# Patient Record
Sex: MALE | Race: BLACK OR AFRICAN AMERICAN | NOT HISPANIC OR LATINO
[De-identification: names, ages, dates, MRNs, and addresses within clinical notes are randomized per-mention and may not be internally consistent; named-entity substitution may affect disease eponyms.]

---

## 2017-04-05 ENCOUNTER — RESULT CHARGE (OUTPATIENT)
Age: 57
End: 2017-04-05

## 2017-04-05 ENCOUNTER — LABORATORY RESULT (OUTPATIENT)
Age: 57
End: 2017-04-05

## 2017-04-05 ENCOUNTER — RX RENEWAL (OUTPATIENT)
Age: 57
End: 2017-04-05

## 2017-04-05 ENCOUNTER — APPOINTMENT (OUTPATIENT)
Dept: INTERNAL MEDICINE | Facility: HOSPITAL | Age: 57
End: 2017-04-05

## 2017-04-05 ENCOUNTER — OUTPATIENT (OUTPATIENT)
Dept: OUTPATIENT SERVICES | Facility: HOSPITAL | Age: 57
LOS: 1 days | End: 2017-04-05

## 2017-04-05 VITALS
BODY MASS INDEX: 34.07 KG/M2 | SYSTOLIC BLOOD PRESSURE: 110 MMHG | HEIGHT: 67 IN | HEART RATE: 90 BPM | OXYGEN SATURATION: 95 % | WEIGHT: 217.04 LBS | DIASTOLIC BLOOD PRESSURE: 82 MMHG

## 2017-04-05 DIAGNOSIS — E11.9 TYPE 2 DIABETES MELLITUS WITHOUT COMPLICATIONS: ICD-10-CM

## 2017-04-05 LAB
BASOPHILS # BLD AUTO: 0.05 K/UL — SIGNIFICANT CHANGE UP (ref 0–0.2)
BASOPHILS NFR BLD AUTO: 0.7 % — SIGNIFICANT CHANGE UP (ref 0–2)
CHOLEST SERPL-MCNC: 194 MG/DL — SIGNIFICANT CHANGE UP (ref 120–199)
EOSINOPHIL # BLD AUTO: 0.23 K/UL — SIGNIFICANT CHANGE UP (ref 0–0.5)
EOSINOPHIL NFR BLD AUTO: 3.2 % — SIGNIFICANT CHANGE UP (ref 0–6)
GLUCOSE BLDC GLUCOMTR-MCNC: 172
HBA1C BLD-MCNC: 7.1 % — HIGH (ref 4–5.6)
HCT VFR BLD CALC: 48.3 % — SIGNIFICANT CHANGE UP (ref 39–50)
HDLC SERPL-MCNC: 39 MG/DL — SIGNIFICANT CHANGE UP (ref 35–55)
HGB BLD-MCNC: 17.3 G/DL — HIGH (ref 13–17)
IMM GRANULOCYTES NFR BLD AUTO: 0.5 % — SIGNIFICANT CHANGE UP (ref 0–1.5)
LIPID PNL WITH DIRECT LDL SERPL: 130 MG/DL — SIGNIFICANT CHANGE UP
LYMPHOCYTES # BLD AUTO: 3.17 K/UL — SIGNIFICANT CHANGE UP (ref 1–3.3)
LYMPHOCYTES # BLD AUTO: 43.5 % — SIGNIFICANT CHANGE UP (ref 13–44)
MCHC RBC-ENTMCNC: 29.8 PG — SIGNIFICANT CHANGE UP (ref 27–34)
MCHC RBC-ENTMCNC: 35.8 % — SIGNIFICANT CHANGE UP (ref 32–36)
MCV RBC AUTO: 83.3 FL — SIGNIFICANT CHANGE UP (ref 80–100)
MONOCYTES # BLD AUTO: 0.53 K/UL — SIGNIFICANT CHANGE UP (ref 0–0.9)
MONOCYTES NFR BLD AUTO: 7.3 % — SIGNIFICANT CHANGE UP (ref 2–14)
NEUTROPHILS # BLD AUTO: 3.26 K/UL — SIGNIFICANT CHANGE UP (ref 1.8–7.4)
NEUTROPHILS NFR BLD AUTO: 44.8 % — SIGNIFICANT CHANGE UP (ref 43–77)
PLATELET # BLD AUTO: 292 K/UL — SIGNIFICANT CHANGE UP (ref 150–400)
PMV BLD: 9.4 FL — SIGNIFICANT CHANGE UP (ref 7–13)
RBC # BLD: 5.8 M/UL — SIGNIFICANT CHANGE UP (ref 4.2–5.8)
RBC # FLD: 13.9 % — SIGNIFICANT CHANGE UP (ref 10.3–14.5)
TRIGL SERPL-MCNC: 209 MG/DL — HIGH (ref 10–149)
TSH SERPL-MCNC: 1.16 UIU/ML — SIGNIFICANT CHANGE UP (ref 0.27–4.2)
WBC # BLD: 7.28 K/UL — SIGNIFICANT CHANGE UP (ref 3.8–10.5)
WBC # FLD AUTO: 7.28 K/UL — SIGNIFICANT CHANGE UP (ref 3.8–10.5)

## 2017-04-06 DIAGNOSIS — E78.5 HYPERLIPIDEMIA, UNSPECIFIED: ICD-10-CM

## 2017-04-06 DIAGNOSIS — H91.90 UNSPECIFIED HEARING LOSS, UNSPECIFIED EAR: ICD-10-CM

## 2017-04-06 DIAGNOSIS — E66.9 OBESITY, UNSPECIFIED: ICD-10-CM

## 2017-04-06 DIAGNOSIS — D75.1 SECONDARY POLYCYTHEMIA: ICD-10-CM

## 2017-04-06 DIAGNOSIS — I10 ESSENTIAL (PRIMARY) HYPERTENSION: ICD-10-CM

## 2017-04-06 DIAGNOSIS — G47.33 OBSTRUCTIVE SLEEP APNEA (ADULT) (PEDIATRIC): ICD-10-CM

## 2017-05-01 ENCOUNTER — RX RENEWAL (OUTPATIENT)
Age: 57
End: 2017-05-01

## 2017-05-10 ENCOUNTER — APPOINTMENT (OUTPATIENT)
Dept: INTERNAL MEDICINE | Facility: HOSPITAL | Age: 57
End: 2017-05-10

## 2017-05-15 ENCOUNTER — RX RENEWAL (OUTPATIENT)
Age: 57
End: 2017-05-15

## 2017-06-01 ENCOUNTER — EMERGENCY (EMERGENCY)
Facility: HOSPITAL | Age: 57
LOS: 1 days | Discharge: HOME | End: 2017-06-01
Attending: EMERGENCY MEDICINE | Admitting: EMERGENCY MEDICINE
Payer: MEDICAID

## 2017-06-01 VITALS
HEART RATE: 85 BPM | DIASTOLIC BLOOD PRESSURE: 103 MMHG | TEMPERATURE: 98 F | RESPIRATION RATE: 16 BRPM | OXYGEN SATURATION: 100 % | SYSTOLIC BLOOD PRESSURE: 141 MMHG

## 2017-06-01 LAB
ALBUMIN SERPL ELPH-MCNC: 4.4 G/DL — SIGNIFICANT CHANGE UP (ref 3.3–5)
ALP SERPL-CCNC: 62 U/L — SIGNIFICANT CHANGE UP (ref 40–120)
ALT FLD-CCNC: 23 U/L — SIGNIFICANT CHANGE UP (ref 4–41)
APTT BLD: 37 SEC — SIGNIFICANT CHANGE UP (ref 27.5–37.4)
AST SERPL-CCNC: 17 U/L — SIGNIFICANT CHANGE UP (ref 4–40)
BILIRUB SERPL-MCNC: 0.5 MG/DL — SIGNIFICANT CHANGE UP (ref 0.2–1.2)
BUN SERPL-MCNC: 14 MG/DL — SIGNIFICANT CHANGE UP (ref 7–23)
CALCIUM SERPL-MCNC: 9.9 MG/DL — SIGNIFICANT CHANGE UP (ref 8.4–10.5)
CHLORIDE SERPL-SCNC: 103 MMOL/L — SIGNIFICANT CHANGE UP (ref 98–107)
CO2 SERPL-SCNC: 28 MMOL/L — SIGNIFICANT CHANGE UP (ref 22–31)
CREAT SERPL-MCNC: 1.13 MG/DL — SIGNIFICANT CHANGE UP (ref 0.5–1.3)
GLUCOSE SERPL-MCNC: 86 MG/DL — SIGNIFICANT CHANGE UP (ref 70–99)
HCT VFR BLD CALC: 48.9 % — SIGNIFICANT CHANGE UP (ref 39–50)
HGB BLD-MCNC: 17.7 G/DL — HIGH (ref 13–17)
INR BLD: 0.96 — SIGNIFICANT CHANGE UP (ref 0.88–1.17)
MCHC RBC-ENTMCNC: 30.3 PG — SIGNIFICANT CHANGE UP (ref 27–34)
MCHC RBC-ENTMCNC: 36.2 % — HIGH (ref 32–36)
MCV RBC AUTO: 83.6 FL — SIGNIFICANT CHANGE UP (ref 80–100)
OB PNL STL: POSITIVE — SIGNIFICANT CHANGE UP
PLATELET # BLD AUTO: 292 K/UL — SIGNIFICANT CHANGE UP (ref 150–400)
PMV BLD: 8.9 FL — SIGNIFICANT CHANGE UP (ref 7–13)
POTASSIUM SERPL-MCNC: 4.5 MMOL/L — SIGNIFICANT CHANGE UP (ref 3.5–5.3)
POTASSIUM SERPL-SCNC: 4.5 MMOL/L — SIGNIFICANT CHANGE UP (ref 3.5–5.3)
PROT SERPL-MCNC: 7.8 G/DL — SIGNIFICANT CHANGE UP (ref 6–8.3)
PROTHROM AB SERPL-ACNC: 10.7 SEC — SIGNIFICANT CHANGE UP (ref 9.8–13.1)
RBC # BLD: 5.85 M/UL — HIGH (ref 4.2–5.8)
RBC # FLD: 13.7 % — SIGNIFICANT CHANGE UP (ref 10.3–14.5)
SODIUM SERPL-SCNC: 144 MMOL/L — SIGNIFICANT CHANGE UP (ref 135–145)
WBC # BLD: 6.75 K/UL — SIGNIFICANT CHANGE UP (ref 3.8–10.5)
WBC # FLD AUTO: 6.75 K/UL — SIGNIFICANT CHANGE UP (ref 3.8–10.5)

## 2017-06-01 PROCEDURE — 99284 EMERGENCY DEPT VISIT MOD MDM: CPT

## 2017-06-01 RX ORDER — DOCUSATE SODIUM 100 MG
2 CAPSULE ORAL
Qty: 60 | Refills: 0 | OUTPATIENT
Start: 2017-06-01

## 2017-06-01 RX ORDER — POLYETHYLENE GLYCOL 3350 17 G/17G
17 POWDER, FOR SOLUTION ORAL
Qty: 17 | Refills: 0 | OUTPATIENT
Start: 2017-06-01 | End: 2017-06-02

## 2017-06-01 RX ORDER — DOCUSATE SODIUM 100 MG
2 CAPSULE ORAL
Qty: 60 | Refills: 0 | OUTPATIENT
Start: 2017-06-01 | End: 2017-07-01

## 2017-06-01 RX ORDER — POLYETHYLENE GLYCOL 3350 17 G/17G
17 POWDER, FOR SOLUTION ORAL
Qty: 1 | Refills: 0 | OUTPATIENT
Start: 2017-06-01 | End: 2017-07-01

## 2017-06-01 NOTE — ED PROVIDER NOTE - PHYSICAL EXAMINATION
ATTENDING PHYSICAL EXAM DR. Lowery ***GEN - NAD; well appearing; A+O x3 ***HEAD - NC/AT ***EYES/NOSE - PERRL, EOMI, mucous membranes moist, no discharge ***THROAT: Oral cavity and pharynx normal. No inflammation, swelling, exudate, or lesions.  ***NECK: Neck supple, non-tender without lymphadenopathy, no masses, no JVD.   ***PULMONARY - CTA b/l, symmetric breath sounds. ***CARDIAC -s1s2, RRR, no M,R,G  ***ABDOMEN - +BS, mild distention, NT, soft, no guarding, no rebound, no masses - Rectal exam performed with chaperone PCA Supriya.  Noted brown stool with red blood around anus.  Positive external hemorrhoid without bleeding.  Normal tone.  No fissure or mass palpated in rectum.  Guaiac test sent.  ***BACK - no CVA tenderness, Normal  spine ***EXTREMITIES - symmetric pulses, 2+ dp, capillary refill < 2 seconds, no clubbing, no cyanosis, no edema ***SKIN - no rash or bruising   ***NEUROLOGIC - alert, CN 2-12 intact

## 2017-06-01 NOTE — ED PROVIDER NOTE - PMH
Diabetes    Personal History of Hypertension    Personal history of thromboembolic disease    Personal history of thromboembolic disease

## 2017-06-01 NOTE — ED PROVIDER NOTE - MEDICAL DECISION MAKING DETAILS
58 y/o M with episode BRBPR, abd bloating.  Check labs, H&H, stool guaiac.  Re-eval.  Consider inpatient vs outpatient GI evaluation.

## 2017-06-01 NOTE — ED PROVIDER NOTE - OBJECTIVE STATEMENT
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 Pt states he has been having hard bowel movements for the past several days and today after passing hard stool he had a brief episode of bright red bleeding from the rectum. Has had small amounts of blood intermittently in the past but never this much. Endorses feeling bloated today but no abdominal pain, nausea, vomiting. No hx of abdominal surgeries. Last colonoscopy 2 years ago. 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 Pt states he has been having hard bowel movements for the past several days and today after passing hard stool he had a brief episode of bright red bleeding from the rectum. Has had small amounts of blood intermittently in the past but never this much. Endorses feeling bloated today but no abdominal pain, nausea, vomiting. No hx of abdominal surgeries. Last colonoscopy 2 years ago.  Attending - Agree with above.  I evaluated patient myself.  56 y/o M with daughter at bedside.  Noted hx HTN, DM, daily ASA.  To ED post episode BRBPR following passing a hard stool.  No abd pain.  Mild bloating sensation.  No back pain.  No lightheadedness.  No chest pain or shortness of breathe.  Only mild bleeding in past.  Never this much before.

## 2017-06-01 NOTE — ED PROVIDER NOTE - GASTROINTESTINAL, MLM
Abdomen soft, non-tender, no guarding. Rectal exam (Kimo Menjivar RN) brown stool in vault, no active bleeding, small external hemorrhoid felt

## 2017-06-01 NOTE — ED POST DISCHARGE NOTE - RESULT SUMMARY
Pt. called ED; states Rx's sent to [facility] not able to be filled because [facility] pt requests Rx's be sent to [facility] in [place]; states he has had Rx's filled there before with no issues.

## 2017-06-01 NOTE — ED POST DISCHARGE NOTE - OTHER COMMUNICATION
eRx's for Colace and Miralax written (same as ED providers that took care of pt) and sent to [facility].

## 2017-06-12 ENCOUNTER — APPOINTMENT (OUTPATIENT)
Dept: INTERNAL MEDICINE | Facility: HOSPITAL | Age: 57
End: 2017-06-12

## 2017-06-12 ENCOUNTER — OUTPATIENT (OUTPATIENT)
Dept: OUTPATIENT SERVICES | Facility: HOSPITAL | Age: 57
LOS: 1 days | End: 2017-06-12

## 2017-06-12 VITALS
DIASTOLIC BLOOD PRESSURE: 76 MMHG | HEART RATE: 72 BPM | BODY MASS INDEX: 32.89 KG/M2 | HEIGHT: 67 IN | SYSTOLIC BLOOD PRESSURE: 118 MMHG | WEIGHT: 209.55 LBS

## 2017-06-13 DIAGNOSIS — D57.1 SICKLE-CELL DISEASE WITHOUT CRISIS: ICD-10-CM

## 2017-06-13 DIAGNOSIS — K64.9 UNSPECIFIED HEMORRHOIDS: ICD-10-CM

## 2017-06-13 DIAGNOSIS — I10 ESSENTIAL (PRIMARY) HYPERTENSION: ICD-10-CM

## 2017-06-13 DIAGNOSIS — E11.9 TYPE 2 DIABETES MELLITUS WITHOUT COMPLICATIONS: ICD-10-CM

## 2017-06-13 DIAGNOSIS — G47.33 OBSTRUCTIVE SLEEP APNEA (ADULT) (PEDIATRIC): ICD-10-CM

## 2017-06-13 DIAGNOSIS — K62.5 HEMORRHAGE OF ANUS AND RECTUM: ICD-10-CM

## 2017-06-13 DIAGNOSIS — E78.5 HYPERLIPIDEMIA, UNSPECIFIED: ICD-10-CM

## 2017-06-13 DIAGNOSIS — K59.00 CONSTIPATION, UNSPECIFIED: ICD-10-CM

## 2017-06-13 LAB — GLUCOSE BLDC GLUCOMTR-MCNC: 98

## 2017-08-21 ENCOUNTER — RX RENEWAL (OUTPATIENT)
Age: 57
End: 2017-08-21

## 2017-10-30 ENCOUNTER — INPATIENT (INPATIENT)
Facility: HOSPITAL | Age: 57
LOS: 2 days | Discharge: HOME | End: 2017-11-02
Attending: INTERNAL MEDICINE | Admitting: INTERNAL MEDICINE
Payer: MEDICAID

## 2017-10-30 VITALS
RESPIRATION RATE: 20 BRPM | SYSTOLIC BLOOD PRESSURE: 152 MMHG | DIASTOLIC BLOOD PRESSURE: 109 MMHG | TEMPERATURE: 98 F | HEART RATE: 77 BPM | OXYGEN SATURATION: 98 %

## 2017-10-30 LAB
ALBUMIN SERPL ELPH-MCNC: 4.5 G/DL — SIGNIFICANT CHANGE UP (ref 3.3–5)
ALP SERPL-CCNC: 69 U/L — SIGNIFICANT CHANGE UP (ref 40–120)
ALT FLD-CCNC: 21 U/L — SIGNIFICANT CHANGE UP (ref 4–41)
APTT BLD: 34.1 SEC — SIGNIFICANT CHANGE UP (ref 27.5–37.4)
AST SERPL-CCNC: 17 U/L — SIGNIFICANT CHANGE UP (ref 4–40)
BASOPHILS # BLD AUTO: 0.06 K/UL — SIGNIFICANT CHANGE UP (ref 0–0.2)
BASOPHILS NFR BLD AUTO: 1 % — SIGNIFICANT CHANGE UP (ref 0–2)
BILIRUB SERPL-MCNC: 0.6 MG/DL — SIGNIFICANT CHANGE UP (ref 0.2–1.2)
BUN SERPL-MCNC: 11 MG/DL — SIGNIFICANT CHANGE UP (ref 7–23)
CALCIUM SERPL-MCNC: 9.6 MG/DL — SIGNIFICANT CHANGE UP (ref 8.4–10.5)
CHLORIDE SERPL-SCNC: 100 MMOL/L — SIGNIFICANT CHANGE UP (ref 98–107)
CK MB BLD-MCNC: 1.1 — SIGNIFICANT CHANGE UP (ref 0–2.5)
CK MB BLD-MCNC: 1.1 — SIGNIFICANT CHANGE UP (ref 0–2.5)
CK MB BLD-MCNC: 1.77 NG/ML — SIGNIFICANT CHANGE UP (ref 1–6.6)
CK MB BLD-MCNC: 2.07 NG/ML — SIGNIFICANT CHANGE UP (ref 1–6.6)
CK SERPL-CCNC: 163 U/L — SIGNIFICANT CHANGE UP (ref 30–200)
CK SERPL-CCNC: 191 U/L — SIGNIFICANT CHANGE UP (ref 30–200)
CO2 SERPL-SCNC: 31 MMOL/L — SIGNIFICANT CHANGE UP (ref 22–31)
CREAT SERPL-MCNC: 1.17 MG/DL — SIGNIFICANT CHANGE UP (ref 0.5–1.3)
EOSINOPHIL # BLD AUTO: 0.2 K/UL — SIGNIFICANT CHANGE UP (ref 0–0.5)
EOSINOPHIL NFR BLD AUTO: 3.4 % — SIGNIFICANT CHANGE UP (ref 0–6)
GLUCOSE SERPL-MCNC: 109 MG/DL — HIGH (ref 70–99)
HBA1C BLD-MCNC: 5.6 % — SIGNIFICANT CHANGE UP (ref 4–5.6)
HCT VFR BLD CALC: 54.5 % — HIGH (ref 39–50)
HGB BLD-MCNC: 18.9 G/DL — HIGH (ref 13–17)
IMM GRANULOCYTES # BLD AUTO: 0.02 # — SIGNIFICANT CHANGE UP
IMM GRANULOCYTES NFR BLD AUTO: 0.3 % — SIGNIFICANT CHANGE UP (ref 0–1.5)
INR BLD: 1.03 — SIGNIFICANT CHANGE UP (ref 0.88–1.17)
LYMPHOCYTES # BLD AUTO: 2.92 K/UL — SIGNIFICANT CHANGE UP (ref 1–3.3)
LYMPHOCYTES # BLD AUTO: 49.7 % — HIGH (ref 13–44)
MCHC RBC-ENTMCNC: 29.1 PG — SIGNIFICANT CHANGE UP (ref 27–34)
MCHC RBC-ENTMCNC: 34.7 % — SIGNIFICANT CHANGE UP (ref 32–36)
MCV RBC AUTO: 83.8 FL — SIGNIFICANT CHANGE UP (ref 80–100)
MONOCYTES # BLD AUTO: 0.56 K/UL — SIGNIFICANT CHANGE UP (ref 0–0.9)
MONOCYTES NFR BLD AUTO: 9.5 % — SIGNIFICANT CHANGE UP (ref 2–14)
NEUTROPHILS # BLD AUTO: 2.11 K/UL — SIGNIFICANT CHANGE UP (ref 1.8–7.4)
NEUTROPHILS NFR BLD AUTO: 36.1 % — LOW (ref 43–77)
NRBC # FLD: 0 — SIGNIFICANT CHANGE UP
PLATELET # BLD AUTO: 311 K/UL — SIGNIFICANT CHANGE UP (ref 150–400)
PMV BLD: 8.7 FL — SIGNIFICANT CHANGE UP (ref 7–13)
POTASSIUM SERPL-MCNC: 4.3 MMOL/L — SIGNIFICANT CHANGE UP (ref 3.5–5.3)
POTASSIUM SERPL-SCNC: 4.3 MMOL/L — SIGNIFICANT CHANGE UP (ref 3.5–5.3)
PROT SERPL-MCNC: 8.2 G/DL — SIGNIFICANT CHANGE UP (ref 6–8.3)
PROTHROM AB SERPL-ACNC: 11.5 SEC — SIGNIFICANT CHANGE UP (ref 9.8–13.1)
RBC # BLD: 6.5 M/UL — HIGH (ref 4.2–5.8)
RBC # FLD: 13.1 % — SIGNIFICANT CHANGE UP (ref 10.3–14.5)
SODIUM SERPL-SCNC: 140 MMOL/L — SIGNIFICANT CHANGE UP (ref 135–145)
TROPONIN T SERPL-MCNC: < 0.06 NG/ML — SIGNIFICANT CHANGE UP (ref 0–0.06)
TROPONIN T SERPL-MCNC: < 0.06 NG/ML — SIGNIFICANT CHANGE UP (ref 0–0.06)
WBC # BLD: 5.87 K/UL — SIGNIFICANT CHANGE UP (ref 3.8–10.5)
WBC # FLD AUTO: 5.87 K/UL — SIGNIFICANT CHANGE UP (ref 3.8–10.5)

## 2017-10-30 PROCEDURE — 70450 CT HEAD/BRAIN W/O DYE: CPT | Mod: 26

## 2017-10-30 PROCEDURE — 71020: CPT | Mod: 26

## 2017-10-30 RX ORDER — AMLODIPINE BESYLATE 2.5 MG/1
10 TABLET ORAL DAILY
Qty: 0 | Refills: 0 | Status: DISCONTINUED | OUTPATIENT
Start: 2017-10-31 | End: 2017-11-02

## 2017-10-30 RX ORDER — ACETAMINOPHEN 500 MG
650 TABLET ORAL ONCE
Qty: 0 | Refills: 0 | Status: COMPLETED | OUTPATIENT
Start: 2017-10-30 | End: 2017-10-30

## 2017-10-30 RX ORDER — METFORMIN HYDROCHLORIDE 850 MG/1
1000 TABLET ORAL
Qty: 0 | Refills: 0 | Status: DISCONTINUED | OUTPATIENT
Start: 2017-10-30 | End: 2017-10-30

## 2017-10-30 RX ORDER — AMLODIPINE BESYLATE 2.5 MG/1
5 TABLET ORAL ONCE
Qty: 0 | Refills: 0 | Status: COMPLETED | OUTPATIENT
Start: 2017-10-30 | End: 2017-10-30

## 2017-10-30 RX ORDER — ASPIRIN/CALCIUM CARB/MAGNESIUM 324 MG
162 TABLET ORAL ONCE
Qty: 0 | Refills: 0 | Status: COMPLETED | OUTPATIENT
Start: 2017-10-30 | End: 2017-10-30

## 2017-10-30 RX ORDER — METOCLOPRAMIDE HCL 10 MG
10 TABLET ORAL ONCE
Qty: 0 | Refills: 0 | Status: COMPLETED | OUTPATIENT
Start: 2017-10-30 | End: 2017-10-30

## 2017-10-30 RX ADMIN — Medication 10 MILLIGRAM(S): at 13:02

## 2017-10-30 RX ADMIN — AMLODIPINE BESYLATE 5 MILLIGRAM(S): 2.5 TABLET ORAL at 11:54

## 2017-10-30 RX ADMIN — Medication 162 MILLIGRAM(S): at 11:12

## 2017-10-30 RX ADMIN — Medication 650 MILLIGRAM(S): at 13:02

## 2017-10-30 RX ADMIN — Medication 650 MILLIGRAM(S): at 13:32

## 2017-10-30 NOTE — ED CDU PROVIDER INITIAL DAY NOTE - PROGRESS NOTE DETAILS
[facility] RIO Ramsey Addendum------  This patient was signed out to me by U RIO Gandhi and U day attending Dr. Torres on 10/30/17 at 1900 hrs; test results reviewed.  In interim, pt has been resting comfortably; no issues to date.  CE x 2 negative; pt pending stress test in the morning.  Pt. denies interim chest discomfort/SOB/other c/o.  Pt stable at present; will continue to monitor / reassess.

## 2017-10-30 NOTE — ED ADULT NURSE NOTE - OBJECTIVE STATEMENT
pt is a 57 yr old male head ache and chest pain radiating from right to left side of chest 10/10. pt states pain as been on and off x 2 months when pt ran out of bp meds. pt states pain worsened this weekend, with numbness to left side of face, denies blurred vision, weakness, difficulty walking. pt weathers, no acute distress noted at this time, bp elevated. piv placed, labs sent, md atwood in progress

## 2017-10-30 NOTE — ED CDU PROVIDER INITIAL DAY NOTE - ATTENDING CONTRIBUTION TO CARE
Dr. Torres:  I performed a face to face bedside interview with patient regarding history of present illness, review of symptoms and past medical history. I completed an independent physical exam.  I have discussed patient's plan of care with PA.   I agree with note as stated above, having amended the EMR as needed to reflect my findings.   This includes HISTORY OF PRESENT ILLNESS, HIV, PAST MEDICAL/SURGICAL/FAMILY/SOCIAL HISTORY, ALLERGIES AND HOME MEDICATIONS, REVIEW OF SYSTEMS, PHYSICAL EXAM, and any PROGRESS NOTES during the time I functioned as the attending physician for this patient.    57M h/o HTN, DM, splenic/renal infarcts, polycythemia, presents with headache x 1 week.  Also endorses intermittent chest pains over past two months.  Has not been compliant with meds.    Exam:  - nad  - rrr  - ctab  - abd soft ntnd  - no focal neuro deficits    A/P  - headache improved in ED  - CP, none currently ,will obtain serial enzymes and stress test in AM

## 2017-10-30 NOTE — ED PROVIDER NOTE - OBJECTIVE STATEMENT
Pt is a 56 y/o M nonsmoker PMHx HTN, HLD, DM, splenic infarct and renal infarct Pt is a 58 y/o M nonsmoker PMHx HTN, HLD, DM, splenic infarct and renal infarct, h/o "high blood counts" which required therapeutic phlebotomy p/w headache x 1 week.  Pt notes gradual onset, waxing and waning left sided temporal headache after running out of blood pressure medication (unsure which).  Pt states over three days, pain became severe and pt took tylenol, which provided relief.  Pt note associated tingling to left temporal region.  Pt presenting today because pain not resolving.  Pt also notes intermittent parasternal chest pain 2-3x / week for past 2 months.  Pain is nonradiating, nonpleuritic, nonpositional.  Pt notes exertional dyspnea x few months.  Denies fevers, chills, nausea, vomiting, numbness, weakness, visual/auditory disturbances, hemoptysis, calf pain/swelling, h/o dvt/pe in past, recent surgeries, family history of heart disease. Pt is a 58 y/o M nonsmoker PMHx HTN, HLD, DM, splenic infarct and renal infarct, h/o "high blood counts" which required therapeutic phlebotomy p/w headache x 1 week.  Pt notes gradual onset, waxing and waning left sided temporal headache after running out of blood pressure medication (unsure which).  Pt states over three days, pain became severe and pt took tylenol, which provided relief.  Pt note associated tingling to left temporal region.  Pt presenting today because pain not resolving.  Pt also notes intermittent parasternal chest pain 2-3x / week for past 2 months.  Pain is nonradiating, nonpleuritic, nonpositional.  Pt notes exertional dyspnea x few months.  Denies fevers, chills, nausea, vomiting, numbness, weakness, visual/auditory disturbances, hemoptysis, calf pain/swelling, h/o dvt/pe in past, recent surgeries, family history of heart disease.    Attendinyo male presents with headache and chest pain for a couple of weeks.  Headache gradual in onset.  Chest pain is nonradiating and not exertional.  of note, pt has been out of BP meds for a couple of months.  Did not see PCP for this.

## 2017-10-30 NOTE — ED ADULT TRIAGE NOTE - CHIEF COMPLAINT QUOTE
pt co headache x 2 weeks states ran out of b/p medication but took one of them for the last two days. pt also reports  right sided chest pain since this am that radiates across his chest. Pts eyes are blood shoot in triage.

## 2017-10-30 NOTE — ED PROVIDER NOTE - MEDICAL DECISION MAKING DETAILS
1.  Headache.  Likely benign but has been on and off for a couple of weeks.  Will get CT head to evaluate for mass or other abnormality.  2.  chest pain.  will get ecg, troponin, consider CDU for further evaluation.

## 2017-10-30 NOTE — ED CDU PROVIDER INITIAL DAY NOTE - OBJECTIVE STATEMENT
Pt is a 56 y/o M nonsmoker PMHx HTN, HLD, DM, splenic infarct and renal infarct, h/o "high blood counts" which required therapeutic phlebotomy p/w headache x 1 week.  Pt notes gradual onset, waxing and waning left sided temporal headache after running out of blood pressure medication (unsure which).  Pt states over three days, pain became severe and pt took tylenol, which provided relief.  Pt note associated tingling to left temporal region.  Pt presenting today because pain not resolving.  Pt also notes intermittent parasternal chest pain 2-3x / week for past 2 months.  Pain is nonradiating, nonpleuritic, nonpositional.  Pt notes exertional dyspnea x few months.  Denies fevers, chills, nausea, vomiting, numbness, weakness, visual/auditory disturbances, hemoptysis, calf pain/swelling, h/o dvt/pe in past, recent surgeries, family history of heart disease.    Attendinyo male presents with headache and chest pain for a couple of weeks.  Headache gradual in onset.  Chest pain is nonradiating and not exertional.  of note, pt has been out of BP meds for a couple of months.  Did not see PCP for this.    ED Note Above: RIO Gandhi: 56 y/o male with a PMHx of DM, HTN, HLD presents to the ER c/o intermittent HA x1 week and 2 months of intermittent chest pain.  Pt ran out of his blood pressure medication and had not taken it for the past few months.  Pt denies shortness of breath, palpitations, fevers, chills, cough, weakness, dizziness, nausea, vomiting.  Pt placed in CDU for cardiac monitoring and Nuclear stress test.

## 2017-10-30 NOTE — ED PROVIDER NOTE - PROGRESS NOTE DETAILS
RIO ROJAS:  Pt notes improvement in headache.  CT negative for acute pathology.  Pt accepted to CDU.

## 2017-10-31 DIAGNOSIS — I20.9 ANGINA PECTORIS, UNSPECIFIED: ICD-10-CM

## 2017-10-31 DIAGNOSIS — I10 ESSENTIAL (PRIMARY) HYPERTENSION: ICD-10-CM

## 2017-10-31 DIAGNOSIS — E11.9 TYPE 2 DIABETES MELLITUS WITHOUT COMPLICATIONS: ICD-10-CM

## 2017-10-31 DIAGNOSIS — D45 POLYCYTHEMIA VERA: ICD-10-CM

## 2017-10-31 DIAGNOSIS — R07.9 CHEST PAIN, UNSPECIFIED: ICD-10-CM

## 2017-10-31 LAB
GLUCOSE BLDC GLUCOMTR-MCNC: 102 MG/DL — HIGH (ref 70–99)
GLUCOSE BLDC GLUCOMTR-MCNC: 136 MG/DL — HIGH (ref 70–99)

## 2017-10-31 PROCEDURE — 78452 HT MUSCLE IMAGE SPECT MULT: CPT | Mod: 26

## 2017-10-31 PROCEDURE — 93016 CV STRESS TEST SUPVJ ONLY: CPT | Mod: GC

## 2017-10-31 PROCEDURE — 93018 CV STRESS TEST I&R ONLY: CPT | Mod: GC

## 2017-10-31 RX ORDER — INFLUENZA VIRUS VACCINE 15; 15; 15; 15 UG/.5ML; UG/.5ML; UG/.5ML; UG/.5ML
0.5 SUSPENSION INTRAMUSCULAR ONCE
Qty: 0 | Refills: 0 | Status: DISCONTINUED | OUTPATIENT
Start: 2017-10-31 | End: 2017-11-02

## 2017-10-31 RX ORDER — INSULIN LISPRO 100/ML
VIAL (ML) SUBCUTANEOUS
Qty: 0 | Refills: 0 | Status: DISCONTINUED | OUTPATIENT
Start: 2017-10-31 | End: 2017-11-02

## 2017-10-31 RX ORDER — DEXTROSE 50 % IN WATER 50 %
1 SYRINGE (ML) INTRAVENOUS ONCE
Qty: 0 | Refills: 0 | Status: DISCONTINUED | OUTPATIENT
Start: 2017-10-31 | End: 2017-11-02

## 2017-10-31 RX ORDER — DEXTROSE 50 % IN WATER 50 %
25 SYRINGE (ML) INTRAVENOUS ONCE
Qty: 0 | Refills: 0 | Status: DISCONTINUED | OUTPATIENT
Start: 2017-10-31 | End: 2017-11-02

## 2017-10-31 RX ORDER — INSULIN LISPRO 100/ML
VIAL (ML) SUBCUTANEOUS AT BEDTIME
Qty: 0 | Refills: 0 | Status: DISCONTINUED | OUTPATIENT
Start: 2017-10-31 | End: 2017-11-02

## 2017-10-31 RX ORDER — METFORMIN HYDROCHLORIDE 850 MG/1
1 TABLET ORAL
Qty: 0 | Refills: 0 | COMMUNITY

## 2017-10-31 RX ORDER — LISINOPRIL 2.5 MG/1
20 TABLET ORAL DAILY
Qty: 0 | Refills: 0 | Status: DISCONTINUED | OUTPATIENT
Start: 2017-10-31 | End: 2017-11-02

## 2017-10-31 RX ORDER — DEXTROSE 50 % IN WATER 50 %
12.5 SYRINGE (ML) INTRAVENOUS ONCE
Qty: 0 | Refills: 0 | Status: DISCONTINUED | OUTPATIENT
Start: 2017-10-31 | End: 2017-11-02

## 2017-10-31 RX ORDER — ASPIRIN/CALCIUM CARB/MAGNESIUM 324 MG
81 TABLET ORAL DAILY
Qty: 0 | Refills: 0 | Status: DISCONTINUED | OUTPATIENT
Start: 2017-10-31 | End: 2017-11-02

## 2017-10-31 RX ORDER — SODIUM CHLORIDE 9 MG/ML
1000 INJECTION, SOLUTION INTRAVENOUS
Qty: 0 | Refills: 0 | Status: DISCONTINUED | OUTPATIENT
Start: 2017-10-31 | End: 2017-11-02

## 2017-10-31 RX ORDER — ATORVASTATIN CALCIUM 80 MG/1
80 TABLET, FILM COATED ORAL AT BEDTIME
Qty: 0 | Refills: 0 | Status: DISCONTINUED | OUTPATIENT
Start: 2017-10-31 | End: 2017-11-02

## 2017-10-31 RX ORDER — GLUCAGON INJECTION, SOLUTION 0.5 MG/.1ML
1 INJECTION, SOLUTION SUBCUTANEOUS ONCE
Qty: 0 | Refills: 0 | Status: DISCONTINUED | OUTPATIENT
Start: 2017-10-31 | End: 2017-11-02

## 2017-10-31 RX ADMIN — ATORVASTATIN CALCIUM 80 MILLIGRAM(S): 80 TABLET, FILM COATED ORAL at 21:45

## 2017-10-31 RX ADMIN — LISINOPRIL 20 MILLIGRAM(S): 2.5 TABLET ORAL at 17:07

## 2017-10-31 RX ADMIN — Medication 81 MILLIGRAM(S): at 17:07

## 2017-10-31 RX ADMIN — AMLODIPINE BESYLATE 10 MILLIGRAM(S): 2.5 TABLET ORAL at 05:08

## 2017-10-31 NOTE — ED CDU PROVIDER SUBSEQUENT DAY NOTE - PROGRESS NOTE DETAILS
Pt denies any complaints as of now. Pt was informed he will be admitted for resting images. Pt agrees to stay. Pt states his PMD is Dr. Pineda affiliated with [facility] Pt denies any complaints as of now. Pt was informed he will be admitted for resting images. Pt agrees to stay. Pt states his PMD is Dr. Pineda affiliated with [facility], no contact info provided by pt, does not know the first name or phone number. Pt admitted to Dr. Carlson

## 2017-10-31 NOTE — ED CDU PROVIDER SUBSEQUENT DAY NOTE - HISTORY
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  Pt. was evaluated in the ED and sent to CDU for cardiac telemetry monitoring, CE #2, stress test, observation and reassessment.  In interim, pt has been resting comfortably; pt denies any interim pain / discomfort; denies SOB, abdominal pain, recurrent chest pain or other c/o.  CDU plan discussed with pt who verbalizes agreement with plan.

## 2017-10-31 NOTE — ED CDU PROVIDER DISPOSITION NOTE - CLINICAL COURSE
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  Pt. was evaluated in the ED and sent to CDU for cardiac telemetry monitoring, CE #2, stress test,  parasternal chest pain, stress lab requires the pt to stay for resting images, will admit the pt to Dr. Carlson.

## 2017-10-31 NOTE — H&P ADULT - MUSCULOSKELETAL
detailed exam no joint erythema/normal/ROM intact/no calf tenderness/no joint swelling/no joint warmth details…

## 2017-10-31 NOTE — H&P ADULT - NSHPSOCIALHISTORY_GEN_ALL_CORE
Pt lives in [place] with grandparents and extended family. Denies tobacco, alcohol, or illicit drug use.

## 2017-10-31 NOTE — H&P ADULT - PMH
Diabetes    HTN (hypertension)    Hyperlipidemia    Polycythemia vera    Renal infarct    Splenic infarct

## 2017-10-31 NOTE — H&P ADULT - ASSESSMENT
56 yo male w/ PMHx of DM, HTN, PCV, spleen infarct (2012), renal infarct (2012), sleep apnea, presenting with CP and HA. Pt admitted for resting images in AM. 56 yo male w/ PMHx of DM, HTN, PCV, spleen infarct (2012), renal infarct (2012), sleep apnea, presenting with non-exertional CP and HA. Pt admitted for resting images in AM.

## 2017-10-31 NOTE — H&P ADULT - HISTORY OF PRESENT ILLNESS
58 yo male w/ PMHx of DM, HTN, PCV, spleen infarct (2012), renal infarct (2012), sleep apnea, presenting with CP. Pt admitted for resting images after stress test. For about 2-3 months, pt has been experiencing intermittent, sharp, 5/10 , non-exertional right-sided CP with radiation to the left side of the chest. Pt experiences this pain about twice a week. For a few weeks he has also had a constant, frontal HA (more on L side) that he describes as "heavy" with radiation to the neck. Pt says he ran out of one of his BP medications but cannot specify which one. Pt also says he has trouble sleeping due to sleep apnea but does not use his CPAP machine because it makes him feel claustrophobic. He also c/o of constipation and experienced BRBPR about two times a week. Pt denies fever/chills, SOB, orthopnea, PND, abdominal pain, N/V/D.

## 2017-10-31 NOTE — ED CDU PROVIDER SUBSEQUENT DAY NOTE - PMH
Diabetes    Personal History of Hypertension    Personal history of thromboembolic disease    Personal history of thromboembolic disease Diabetes    HTN (hypertension)    Hyperlipidemia    Polycythemia vera    Renal infarct    Splenic infarct

## 2017-10-31 NOTE — ED CDU PROVIDER SUBSEQUENT DAY NOTE - ATTENDING CONTRIBUTION TO CARE
I performed a face to face bedside interview with patient regarding history of present illness, review of symptoms and past medical history. I completed an independent physical exam.  I have discussed patient's plan of care.   I agree with note as stated above, having amended the EMR as needed to reflect my findings. I have discussed the assessment and plan of care.  This includes during the time I functioned as the attending physician for this patient.  Attending Contribution to Care:agree with plan of PA. pt stable. no active cp

## 2017-10-31 NOTE — ED CDU PROVIDER DISPOSITION NOTE - ATTENDING CONTRIBUTION TO CARE
I performed a face to face bedside interview with patient regarding history of present illness, review of symptoms and past medical history. I completed an independent physical exam.  I have discussed patient's plan of care.   I agree with note as stated above, having amended the EMR as needed to reflect my findings. I have discussed the assessment and plan of care.  This includes during the time I functioned as the attending physician for this patient.  Attending Contribution to Care: agree with plan of PA.  pt stable, failed nuclear chest pain. requires admissino for resting stress test. stable for admission. no changes in EKG

## 2017-10-31 NOTE — H&P ADULT - NEGATIVE CARDIOVASCULAR SYMPTOMS
no dyspnea on exertion/no orthopnea/no peripheral edema/no palpitations/no paroxysmal nocturnal dyspnea

## 2017-10-31 NOTE — H&P ADULT - NSHPLABSRESULTS_GEN_ALL_CORE
ICU Vital Signs Last 24 Hrs  T(C): 36.8 (31 Oct 2017 12:39), Max: 36.9 (30 Oct 2017 21:51)  T(F): 98.2 (31 Oct 2017 12:39), Max: 98.5 (30 Oct 2017 21:51)  HR: 80 (31 Oct 2017 12:39) (56 - 80)  BP: 151/98 (31 Oct 2017 12:39) (130/74 - 164/106)  BP(mean): --  ABP: --  ABP(mean): --  RR: 18 (31 Oct 2017 12:39) (16 - 18)  SpO2: 97% (31 Oct 2017 12:39) (96% - 98%)                          18.9   5.87  )-----------( 311      ( 30 Oct 2017 10:30 )             54.5   10-30    140  |  100  |  11  ----------------------------<  109<H>  4.3   |  31  |  1.17    Ca    9.6      30 Oct 2017 10:30    TPro  8.2  /  Alb  4.5  /  TBili  0.6  /  DBili  x   /  AST  17  /  ALT  21  /  AlkPhos  69  10-30 ICU Vital Signs Last 24 Hrs  T(C): 36.8 (31 Oct 2017 12:39), Max: 36.9 (30 Oct 2017 21:51)  T(F): 98.2 (31 Oct 2017 12:39), Max: 98.5 (30 Oct 2017 21:51)  HR: 80 (31 Oct 2017 12:39) (56 - 80)  BP: 151/98 (31 Oct 2017 12:39) (130/74 - 164/106)  BP(mean): --  ABP: --  ABP(mean): --  RR: 18 (31 Oct 2017 12:39) (16 - 18)  SpO2: 97% (31 Oct 2017 12:39) (96% - 98%)                          18.9   5.87  )-----------( 311      ( 30 Oct 2017 10:30 )             54.5   10-30    140  |  100  |  11  ----------------------------<  109<H>  4.3   |  31  |  1.17    Ca    9.6      30 Oct 2017 10:30    TPro  8.2  /  Alb  4.5  /  TBili  0.6  /  DBili  x   /  AST  17  /  ALT  21  /  AlkPhos  69  10-30    EKG NSR @ 64 with QIII and TWI in III    CXR Clear  CT Head Negative

## 2017-11-01 ENCOUNTER — TRANSCRIPTION ENCOUNTER (OUTPATIENT)
Age: 57
End: 2017-11-01

## 2017-11-01 LAB
BUN SERPL-MCNC: 15 MG/DL — SIGNIFICANT CHANGE UP (ref 7–23)
CALCIUM SERPL-MCNC: 9.3 MG/DL — SIGNIFICANT CHANGE UP (ref 8.4–10.5)
CHLORIDE SERPL-SCNC: 100 MMOL/L — SIGNIFICANT CHANGE UP (ref 98–107)
CHOLEST SERPL-MCNC: 229 MG/DL — HIGH (ref 120–199)
CO2 SERPL-SCNC: 26 MMOL/L — SIGNIFICANT CHANGE UP (ref 22–31)
CREAT SERPL-MCNC: 1.07 MG/DL — SIGNIFICANT CHANGE UP (ref 0.5–1.3)
GLUCOSE BLDC GLUCOMTR-MCNC: 101 MG/DL — HIGH (ref 70–99)
GLUCOSE BLDC GLUCOMTR-MCNC: 102 MG/DL — HIGH (ref 70–99)
GLUCOSE BLDC GLUCOMTR-MCNC: 81 MG/DL — SIGNIFICANT CHANGE UP (ref 70–99)
GLUCOSE BLDC GLUCOMTR-MCNC: 89 MG/DL — SIGNIFICANT CHANGE UP (ref 70–99)
GLUCOSE SERPL-MCNC: 101 MG/DL — HIGH (ref 70–99)
HCT VFR BLD CALC: 50.9 % — HIGH (ref 39–50)
HDLC SERPL-MCNC: 43 MG/DL — SIGNIFICANT CHANGE UP (ref 35–55)
HGB BLD-MCNC: 18.3 G/DL — HIGH (ref 13–17)
LIPID PNL WITH DIRECT LDL SERPL: 172 MG/DL — SIGNIFICANT CHANGE UP
MAGNESIUM SERPL-MCNC: 2.3 MG/DL — SIGNIFICANT CHANGE UP (ref 1.6–2.6)
MCHC RBC-ENTMCNC: 29.2 PG — SIGNIFICANT CHANGE UP (ref 27–34)
MCHC RBC-ENTMCNC: 36 % — SIGNIFICANT CHANGE UP (ref 32–36)
MCV RBC AUTO: 81.3 FL — SIGNIFICANT CHANGE UP (ref 80–100)
NRBC # FLD: 0 — SIGNIFICANT CHANGE UP
PLATELET # BLD AUTO: 302 K/UL — SIGNIFICANT CHANGE UP (ref 150–400)
PMV BLD: 8.7 FL — SIGNIFICANT CHANGE UP (ref 7–13)
POTASSIUM SERPL-MCNC: 4 MMOL/L — SIGNIFICANT CHANGE UP (ref 3.5–5.3)
POTASSIUM SERPL-SCNC: 4 MMOL/L — SIGNIFICANT CHANGE UP (ref 3.5–5.3)
RBC # BLD: 6.26 M/UL — HIGH (ref 4.2–5.8)
RBC # FLD: 13.2 % — SIGNIFICANT CHANGE UP (ref 10.3–14.5)
SODIUM SERPL-SCNC: 137 MMOL/L — SIGNIFICANT CHANGE UP (ref 135–145)
TRIGL SERPL-MCNC: 140 MG/DL — SIGNIFICANT CHANGE UP (ref 10–149)
WBC # BLD: 6.73 K/UL — SIGNIFICANT CHANGE UP (ref 3.8–10.5)
WBC # FLD AUTO: 6.73 K/UL — SIGNIFICANT CHANGE UP (ref 3.8–10.5)

## 2017-11-01 PROCEDURE — 99152 MOD SED SAME PHYS/QHP 5/>YRS: CPT

## 2017-11-01 PROCEDURE — 93458 L HRT ARTERY/VENTRICLE ANGIO: CPT | Mod: 26

## 2017-11-01 RX ORDER — SODIUM CHLORIDE 9 MG/ML
500 INJECTION INTRAMUSCULAR; INTRAVENOUS; SUBCUTANEOUS
Qty: 0 | Refills: 0 | Status: COMPLETED | OUTPATIENT
Start: 2017-11-01 | End: 2017-11-01

## 2017-11-01 RX ADMIN — SODIUM CHLORIDE 75 MILLILITER(S): 9 INJECTION INTRAMUSCULAR; INTRAVENOUS; SUBCUTANEOUS at 23:37

## 2017-11-01 RX ADMIN — ATORVASTATIN CALCIUM 80 MILLIGRAM(S): 80 TABLET, FILM COATED ORAL at 21:34

## 2017-11-01 RX ADMIN — LISINOPRIL 20 MILLIGRAM(S): 2.5 TABLET ORAL at 05:47

## 2017-11-01 RX ADMIN — AMLODIPINE BESYLATE 10 MILLIGRAM(S): 2.5 TABLET ORAL at 05:47

## 2017-11-01 RX ADMIN — Medication 81 MILLIGRAM(S): at 13:00

## 2017-11-01 NOTE — PROGRESS NOTE ADULT - ASSESSMENT
56 yo male w/ PMHx of DM, HTN, PCV, spleen infarct (2012), renal infarct (2012), sleep apnea, presenting with non-exertional CP and HA. Pt admitted for resting images in AM.

## 2017-11-01 NOTE — DISCHARGE NOTE ADULT - MEDICATION SUMMARY - MEDICATIONS TO TAKE
I will START or STAY ON the medications listed below when I get home from the hospital:    Aspirin Low Dose 81 mg oral delayed release tablet  -- 1 tab(s) by mouth once a day  -- Indication: For Cardioprotective    lisinopril 20 mg oral tablet  -- 1 tab(s) by mouth once a day  -- Indication: For HTN (hypertension)    metFORMIN 1000 mg oral tablet  -- 1 tab(s) by mouth 2 times a day  DO NOT RESUME THIS MEDICATION UNTIL 11/4  -- Indication: For DM (diabetes mellitus)    atorvastatin 80 mg oral tablet  -- 1 tab(s) by mouth once a day  -- Indication: For HLD (hyperlipidemia)    amlodipine 10 mg oral tablet  -- 1 tab(s) by mouth once a day  -- Indication: For HTN (hypertension)

## 2017-11-01 NOTE — DISCHARGE NOTE ADULT - CARE PROVIDER_API CALL
Hever Carlson), Cardiovascular Disease; Internal Medicine  [address] [address]  Phone: [phone]  Fax: [phone]

## 2017-11-01 NOTE — DISCHARGE NOTE ADULT - NS AS ACTIVITY OBS
Bathing allowed/Walking-Indoors allowed/No Heavy lifting/straining/Showering allowed/Walking-Outdoors allowed/Do not make important decisions

## 2017-11-01 NOTE — CHART NOTE - NSCHARTNOTEFT_GEN_A_CORE
Patient is s/p cardiac cath via right radial access. Patient currently without any complaints. Site is stable without hematoma, active bleeding or swelling. Dressing is clean/dry and intact. Right Radial pulse is palpable.  Capillary refill 2+.Will monitor closely.

## 2017-11-01 NOTE — DISCHARGE NOTE ADULT - PATIENT PORTAL LINK FT
“You can access the Follow[employer] Patient Portal, offered by [facility], by registering with the following website: [email or web address]”

## 2017-11-01 NOTE — PROGRESS NOTE ADULT - SUBJECTIVE AND OBJECTIVE BOX
Subjective: Patient seen and examined. No new events except as noted.     SUBJECTIVE/ROS:  No chest pain, or sob.       MEDICATIONS:  MEDICATIONS  (STANDING):  amLODIPine   Tablet 10 milliGRAM(s) Oral daily  aspirin enteric coated 81 milliGRAM(s) Oral daily  atorvastatin 80 milliGRAM(s) Oral at bedtime  dextrose 5%. 1000 milliLiter(s) (50 mL/Hr) IV Continuous <Continuous>  dextrose 50% Injectable 12.5 Gram(s) IV Push once  dextrose 50% Injectable 25 Gram(s) IV Push once  dextrose 50% Injectable 25 Gram(s) IV Push once  influenza   Vaccine 0.5 milliLiter(s) IntraMuscular once  insulin lispro (HumaLOG) corrective regimen sliding scale   SubCutaneous three times a day before meals  insulin lispro (HumaLOG) corrective regimen sliding scale   SubCutaneous at bedtime  lisinopril 20 milliGRAM(s) Oral daily      PHYSICAL EXAM:  T(C): 36.5 (11-01-17 @ 13:17), Max: 36.8 (10-31-17 @ 17:12)  HR: 71 (11-01-17 @ 14:49) (68 - 83)  BP: 141/98 (11-01-17 @ 13:17) (125/8 - 144/93)  RR: 18 (11-01-17 @ 13:17) (18 - 18)  SpO2: 96% (11-01-17 @ 14:49) (94% - 100%)  Wt(kg): --  I&O's Summary        Appearance: Normal	  HEENT:   Normal oral mucosa, PERRL, EOMI	  Cardiovascular: Normal S1 S2,    Murmur:   Neck: JVP normal  Respiratory: Lungs clear to auscultation  Gastrointestinal:  Soft, Non-tender, + BS	  Skin: normal   Neuro: No gross deficits.   Psychiatry:  Mood & affect appropriate  Ext: No edema      LABS/DATA:    CARDIAC MARKERS:  CARDIAC MARKERS ( 30 Oct 2017 16:40 )  x     / < 0.06 ng/mL / 163 u/L / 1.77 ng/mL / x      CARDIAC MARKERS ( 30 Oct 2017 10:30 )  x     / < 0.06 ng/mL / 191 u/L / 2.07 ng/mL / x                                    18.3   6.73  )-----------( 302      ( 01 Nov 2017 06:16 )             50.9     11-01    137  |  100  |  15  ----------------------------<  101<H>  4.0   |  26  |  1.07    Ca    9.3      01 Nov 2017 06:16  Mg     2.3     11-01      proBNP:   Lipid Profile:   HgA1c:   TSH:     TELE:  EKG:

## 2017-11-01 NOTE — DISCHARGE NOTE ADULT - HOSPITAL COURSE
58 yo male w/ PMHx of DM, HTN, PCV, spleen infarct (2012), renal infarct (2012), sleep apnea, presenting with CP. Pt admitted for resting images after stress test. For about 2-3 months, pt has been experiencing intermittent, sharp, 5/10 , non-exertional right-sided CP with radiation to the left side of the chest. Pt experiences this pain about twice a week. For a few weeks he has also had a constant, frontal HA (more on L side) that he describes as "heavy" with radiation to the neck. Pt says he ran out of one of his BP medications but cannot specify which one. Pt also says he has trouble sleeping due to sleep apnea but does not use his CPAP machine because it makes him feel claustrophobic. He also c/o of constipation and experienced BRBPR about two times a week. Pt denies fever/chills, SOB, orthopnea, PND, abdominal pain, N/V/D.     Hospital course:  EKG NSR @ 64 with QIII and TWI in III  CE Neg x2  CXR Clear  CT Head Negative    CE negative with no EKG change, low suspicion for ACS but given comorbidity recommended stress test. NST ***. Pt cleared for discharge 58 y/o male with a PMHx of HTN, HLD, DM, PCV, spleen infarct (2012), renal infarct (2012), and sleep apnea presented to [facility] complaining of chest pain. Upon arrival to ED, EKG revealed NSR at 64 bpm with Q waves III and TWI in III. Pt ruled out for ACS with two sets of negative cardiac enzymes. CXR showed clear lungs. CT head was negative. Pt was admitted to telemetry. Pt underwent nuclear stress test, which revealed, "Abnormal study. Myocardial Perfusion SPECT results are abnormal at 83% of MPHR. There is a small, mild to moderate defect in inferior wall that is predominantly fixed with mild alec-infarct ischemia. Post-stress gated wall motion analysis was performed (LVEF = 53%; LVEDV = 94 ml.), revealing normal LV systolic function. The LV time activity curve suggested diastolic dysfunction. RV function appeared normal." Pt underwent cardiac cath which revealed normal coronary arteries. Case discussed with Dr. Carlson on 11/2. Pt now medically stable for discharge home.

## 2017-11-01 NOTE — DISCHARGE NOTE ADULT - PLAN OF CARE
prevent chest pain please take your medication as directed and follow up with your cardiologist in 2 weeks Continue medications as currently prescribed. Follow up with your PMD for further management of disease. Dash diet. Continue diet modification. Avoid complex carbohydrates such as bread, pasta, cereal, white rice, white potatoes, etc. Avoid concentrated sugar as found in desserts, candy, soda, juice, etc. Consume a diet based on lean protein (chicken, fish) and vegetables. please follow up with your PMD or hematologist per your regular schedule Prevent cardiac disease. Your cardiac catheterization revealed normal coronary arteries with no blockages. Follow up with cardiologist within one week of discharge. Call for appointment. Return to ED for any concerning symptoms. Continue medications as prescribed. Low salt, low fat, low cholesterol diet. Maintain adequate control of your blood pressure. Goal BP < 130/80. Continue low sodium diet. Follow up with PCP and/or cardiologist for ongoing medical management of your hypertension. Continue medications as prescribed. Low salt diet. Maintain adequate control of your cholesterol levels. Goal LDL < 70. Follow up with PCP for ongoing medical management. Continue medications as prescribed. Low cholesterol diet. Maintain adequate glycemic control. Monitor your sugars. Goal HgA1C < 7.0%. Low carb diet. Follow up with PCP and/or endocrinologist for ongoing medical management of your diabetes. Continue your medications as prescribed. Low carb diet. Do not start oral diabetic medications until 72 hours post procedure (resume on 11/4).

## 2017-11-01 NOTE — CHART NOTE - NSCHARTNOTEFT_GEN_A_CORE
Rt radial band d/ashley. No hematoma or bleeding noted .Rt radial pulse +2 with good capillary refills to all fingers.4x4 gauze dsg  applied.

## 2017-11-01 NOTE — DISCHARGE NOTE ADULT - SECONDARY DIAGNOSIS.
Hypertension Diabetes mellitus Polycythemia vera HTN (hypertension) HLD (hyperlipidemia) DM (diabetes mellitus)

## 2017-11-01 NOTE — DISCHARGE NOTE ADULT - CARE PLAN
Principal Discharge DX:	Chest pain  Goal:	prevent chest pain  Instructions for follow-up, activity and diet:	please take your medication as directed and follow up with your cardiologist in 2 weeks  Secondary Diagnosis:	Hypertension  Instructions for follow-up, activity and diet:	Continue medications as currently prescribed. Follow up with your PMD for further management of disease. Dash diet.  Secondary Diagnosis:	Diabetes mellitus  Instructions for follow-up, activity and diet:	Continue diet modification. Avoid complex carbohydrates such as bread, pasta, cereal, white rice, white potatoes, etc. Avoid concentrated sugar as found in desserts, candy, soda, juice, etc. Consume a diet based on lean protein (chicken, fish) and vegetables.  Secondary Diagnosis:	Polycythemia vera  Instructions for follow-up, activity and diet:	please follow up with your PMD or hematologist per your regular schedule Principal Discharge DX:	Chest pain  Goal:	Prevent cardiac disease. Your cardiac catheterization revealed normal coronary arteries with no blockages.  Instructions for follow-up, activity and diet:	Follow up with cardiologist within one week of discharge. Call for appointment. Return to ED for any concerning symptoms. Continue medications as prescribed. Low salt, low fat, low cholesterol diet.  Secondary Diagnosis:	HTN (hypertension)  Goal:	Maintain adequate control of your blood pressure. Goal BP < 130/80. Continue low sodium diet.  Instructions for follow-up, activity and diet:	Follow up with PCP and/or cardiologist for ongoing medical management of your hypertension. Continue medications as prescribed. Low salt diet.  Secondary Diagnosis:	HLD (hyperlipidemia)  Goal:	Maintain adequate control of your cholesterol levels. Goal LDL < 70.  Instructions for follow-up, activity and diet:	Follow up with PCP for ongoing medical management. Continue medications as prescribed. Low cholesterol diet.  Secondary Diagnosis:	DM (diabetes mellitus)  Goal:	Maintain adequate glycemic control. Monitor your sugars. Goal HgA1C < 7.0%. Low carb diet.  Instructions for follow-up, activity and diet:	Follow up with PCP and/or endocrinologist for ongoing medical management of your diabetes. Continue your medications as prescribed. Low carb diet. Do not start oral diabetic medications until 72 hours post procedure (resume on 11/4).

## 2017-11-02 VITALS — HEART RATE: 74 BPM | OXYGEN SATURATION: 97 %

## 2017-11-02 PROBLEM — I10 ESSENTIAL (PRIMARY) HYPERTENSION: Chronic | Status: ACTIVE | Noted: 2017-10-31

## 2017-11-02 PROBLEM — D45 POLYCYTHEMIA VERA: Chronic | Status: ACTIVE | Noted: 2017-10-31

## 2017-11-02 PROBLEM — N28.0 ISCHEMIA AND INFARCTION OF KIDNEY: Chronic | Status: ACTIVE | Noted: 2017-10-31

## 2017-11-02 PROBLEM — D73.5 INFARCTION OF SPLEEN: Chronic | Status: ACTIVE | Noted: 2017-10-31

## 2017-11-02 PROBLEM — E78.5 HYPERLIPIDEMIA, UNSPECIFIED: Chronic | Status: ACTIVE | Noted: 2017-10-31

## 2017-11-02 LAB
BUN SERPL-MCNC: 23 MG/DL — SIGNIFICANT CHANGE UP (ref 7–23)
CALCIUM SERPL-MCNC: 9.3 MG/DL — SIGNIFICANT CHANGE UP (ref 8.4–10.5)
CHLORIDE SERPL-SCNC: 103 MMOL/L — SIGNIFICANT CHANGE UP (ref 98–107)
CO2 SERPL-SCNC: 26 MMOL/L — SIGNIFICANT CHANGE UP (ref 22–31)
CREAT SERPL-MCNC: 1.27 MG/DL — SIGNIFICANT CHANGE UP (ref 0.5–1.3)
GLUCOSE BLDC GLUCOMTR-MCNC: 110 MG/DL — HIGH (ref 70–99)
GLUCOSE SERPL-MCNC: 115 MG/DL — HIGH (ref 70–99)
HCT VFR BLD CALC: 51 % — HIGH (ref 39–50)
HGB BLD-MCNC: 18.2 G/DL — HIGH (ref 13–17)
MAGNESIUM SERPL-MCNC: 2.1 MG/DL — SIGNIFICANT CHANGE UP (ref 1.6–2.6)
MCHC RBC-ENTMCNC: 29.2 PG — SIGNIFICANT CHANGE UP (ref 27–34)
MCHC RBC-ENTMCNC: 35.7 % — SIGNIFICANT CHANGE UP (ref 32–36)
MCV RBC AUTO: 81.7 FL — SIGNIFICANT CHANGE UP (ref 80–100)
NRBC # FLD: 0 — SIGNIFICANT CHANGE UP
PHOSPHATE SERPL-MCNC: 3.7 MG/DL — SIGNIFICANT CHANGE UP (ref 2.5–4.5)
PLATELET # BLD AUTO: 308 K/UL — SIGNIFICANT CHANGE UP (ref 150–400)
PMV BLD: 8.9 FL — SIGNIFICANT CHANGE UP (ref 7–13)
POTASSIUM SERPL-MCNC: 4 MMOL/L — SIGNIFICANT CHANGE UP (ref 3.5–5.3)
POTASSIUM SERPL-SCNC: 4 MMOL/L — SIGNIFICANT CHANGE UP (ref 3.5–5.3)
RBC # BLD: 6.24 M/UL — HIGH (ref 4.2–5.8)
RBC # FLD: 13.3 % — SIGNIFICANT CHANGE UP (ref 10.3–14.5)
SODIUM SERPL-SCNC: 141 MMOL/L — SIGNIFICANT CHANGE UP (ref 135–145)
WBC # BLD: 6.81 K/UL — SIGNIFICANT CHANGE UP (ref 3.8–10.5)
WBC # FLD AUTO: 6.81 K/UL — SIGNIFICANT CHANGE UP (ref 3.8–10.5)

## 2017-11-02 RX ORDER — AMLODIPINE BESYLATE 2.5 MG/1
1 TABLET ORAL
Qty: 30 | Refills: 0
Start: 2017-11-02 | End: 2017-12-02

## 2017-11-02 RX ORDER — ATORVASTATIN CALCIUM 80 MG/1
1 TABLET, FILM COATED ORAL
Qty: 0 | Refills: 0 | COMMUNITY

## 2017-11-02 RX ORDER — METFORMIN HYDROCHLORIDE 850 MG/1
1 TABLET ORAL
Qty: 60 | Refills: 0
Start: 2017-11-02 | End: 2017-12-02

## 2017-11-02 RX ORDER — ASPIRIN/CALCIUM CARB/MAGNESIUM 324 MG
1 TABLET ORAL
Qty: 30 | Refills: 0
Start: 2017-11-02 | End: 2017-12-02

## 2017-11-02 RX ORDER — METFORMIN HYDROCHLORIDE 850 MG/1
1 TABLET ORAL
Qty: 0 | Refills: 0 | DISCHARGE
Start: 2017-11-02 | End: 2017-12-02

## 2017-11-02 RX ORDER — LISINOPRIL 2.5 MG/1
1 TABLET ORAL
Qty: 0 | Refills: 0 | COMMUNITY

## 2017-11-02 RX ORDER — LISINOPRIL 2.5 MG/1
1 TABLET ORAL
Qty: 30 | Refills: 0
Start: 2017-11-02 | End: 2017-12-02

## 2017-11-02 RX ORDER — ATORVASTATIN CALCIUM 80 MG/1
1 TABLET, FILM COATED ORAL
Qty: 30 | Refills: 0
Start: 2017-11-02 | End: 2017-12-02

## 2017-11-02 RX ORDER — METFORMIN HYDROCHLORIDE 850 MG/1
1 TABLET ORAL
Qty: 0 | Refills: 0 | COMMUNITY

## 2017-11-02 RX ADMIN — AMLODIPINE BESYLATE 10 MILLIGRAM(S): 2.5 TABLET ORAL at 05:44

## 2017-11-02 RX ADMIN — LISINOPRIL 20 MILLIGRAM(S): 2.5 TABLET ORAL at 05:44

## 2017-11-02 RX ADMIN — Medication 81 MILLIGRAM(S): at 11:48

## 2017-11-02 NOTE — CONSULT NOTE ADULT - SUBJECTIVE AND OBJECTIVE BOX
cc chest pain  [place] 58 yo male w/ PMHx of DM, HTN, PCV, spleen infarct (2012), renal infarct (2012), sleep apnea, presenting with CP. Pt admitted for resting images after stress test. For about 2-3 months, pt has been experiencing intermittent, sharp, 5/10 , non-exertional right-sided CP with radiation to the left side of the chest. Pt experiences this pain about twice a week. For a few weeks he has also had a constant, frontal HA (more on L side) that he describes as "heavy" with radiation to the neck. Pt says he ran out of one of his BP medications but cannot specify which one. Pt also says he has trouble sleeping due to sleep apnea but does not use his CPAP machine because it makes him feel claustrophobic. He also c/o of constipation and experienced BRBPR about two times a week. Pt denies fever/chills, SOB, orthopnea, PND, abdominal pain, N/V/D.     Allergies NKDA    REVIEW OF SYSTEMS:    CONSTITUTIONAL: No weakness, fevers or chills  EYES/ENT: No visual changes;  No vertigo or throat pain   NECK: No pain or stiffness  RESPIRATORY: No cough, wheezing, hemoptysis; No shortness of breath  CARDIOVASCULAR: No chest pain or palpitations  GASTROINTESTINAL: No abdominal or epigastric pain. No nausea, vomiting, or hematemesis; No diarrhea or constipation. No melena or hematochezia.  GENITOURINARY: No dysuria, frequency or hematuria  NEUROLOGICAL: No numbness or weakness  SKIN: No itching, burning, rashes, or lesions   All other review of systems is negative unless indicated above.      Home Medications:   * Patient Currently Takes Medications as of 31-Oct-2017 13:22 documented in Structured Notes  · 	Aspirin Low Dose 81 mg oral delayed release tablet: 1 tab(s) orally once a day, Last Dose Taken:    · 	amlodipine 10 mg oral tablet: 1 tab(s) orally once a day, Last Dose Taken:    · 	lisinopril 20 mg oral tablet: 1 tab(s) orally once a day, Last Dose Taken:    · 	atorvastatin 80 mg oral tablet: 1 tab(s) orally once a day, Last Dose Taken:    · 	metFORMIN 1000 mg oral tablet: 1 tab(s) orally 2 times a day, Last Dose Taken:      ..    Patient History:   Past Medical History:  Diabetes    HTN (hypertension)    Hyperlipidemia    Polycythemia vera    Renal infarct    Splenic infarct.    Past Surgical History:  No significant past surgical history.    Family History:  Mother  Still living? Unknown  Family history of diabetes mellitus, Age at diagnosis: Age Unknown.    Social History:  Social History (marital status, living situation, occupation, tobacco use, alcohol and drug use, and sexual history): Pt lives in [place] with grandparents and extended family. Denies tobacco, alcohol, or illicit drug use.	    Tobacco Screening:  · Core Measure Site	No	  · Has the patient used tobacco in the past 30 days?	No

## 2017-11-02 NOTE — CONSULT NOTE ADULT - ASSESSMENT
58 yo male w/ PMHx of DM, HTN, PCV, spleen infarct (2012), renal infarct (2012), sleep apnea, presenting with non-exertional CP and HA. Pt admitted for resting images in AM.     Problem/Plan - 1:  ·  Problem: Angina pectoris.  Plan: Admit to tele  - serial EKG's, CE  - NST  - F/u with cardiology.     Problem/Plan - 2:  ·  Problem: HTN (hypertension).  Plan: - Monitor BP's  - Continue home meds.     Problem/Plan - 3:  ·  Problem: Diabetes mellitus.  Plan: Fingersticks, HBA1c, insulin sliding scale, hold metformin.     Problem/Plan - 4:  ·  Problem: Polycythemia vera.  Plan: - Monitor H&H. - Monitor H&H  - ?Phlebotomy

## 2017-11-10 ENCOUNTER — OUTPATIENT (OUTPATIENT)
Dept: OUTPATIENT SERVICES | Facility: HOSPITAL | Age: 57
LOS: 1 days | End: 2017-11-10

## 2017-11-10 ENCOUNTER — APPOINTMENT (OUTPATIENT)
Dept: INTERNAL MEDICINE | Facility: HOSPITAL | Age: 57
End: 2017-11-10
Payer: MEDICAID

## 2017-11-10 VITALS — HEART RATE: 76 BPM | DIASTOLIC BLOOD PRESSURE: 80 MMHG | SYSTOLIC BLOOD PRESSURE: 118 MMHG

## 2017-11-10 VITALS — BODY MASS INDEX: 31.55 KG/M2 | WEIGHT: 201 LBS | HEIGHT: 67 IN

## 2017-11-10 DIAGNOSIS — G47.33 OBSTRUCTIVE SLEEP APNEA (ADULT) (PEDIATRIC): ICD-10-CM

## 2017-11-10 DIAGNOSIS — D75.1 SECONDARY POLYCYTHEMIA: ICD-10-CM

## 2017-11-10 DIAGNOSIS — Z00.00 ENCOUNTER FOR GENERAL ADULT MEDICAL EXAMINATION WITHOUT ABNORMAL FINDINGS: ICD-10-CM

## 2017-11-10 DIAGNOSIS — E78.5 HYPERLIPIDEMIA, UNSPECIFIED: ICD-10-CM

## 2017-11-10 DIAGNOSIS — Z23 ENCOUNTER FOR IMMUNIZATION: ICD-10-CM

## 2017-11-10 DIAGNOSIS — E11.9 TYPE 2 DIABETES MELLITUS WITHOUT COMPLICATIONS: ICD-10-CM

## 2017-11-10 DIAGNOSIS — R07.89 OTHER CHEST PAIN: ICD-10-CM

## 2017-11-10 DIAGNOSIS — I10 ESSENTIAL (PRIMARY) HYPERTENSION: ICD-10-CM

## 2017-11-10 PROCEDURE — 99214 OFFICE O/P EST MOD 30 MIN: CPT | Mod: GC

## 2017-11-13 ENCOUNTER — OUTPATIENT (OUTPATIENT)
Dept: OUTPATIENT SERVICES | Facility: HOSPITAL | Age: 57
LOS: 1 days | Discharge: HOME | End: 2017-11-13

## 2017-11-13 DIAGNOSIS — D45 POLYCYTHEMIA VERA: ICD-10-CM

## 2017-11-16 ENCOUNTER — RESULT REVIEW (OUTPATIENT)
Age: 57
End: 2017-11-16

## 2017-11-16 ENCOUNTER — APPOINTMENT (OUTPATIENT)
Dept: INFUSION THERAPY | Facility: HOSPITAL | Age: 57
End: 2017-11-16

## 2017-11-16 ENCOUNTER — APPOINTMENT (OUTPATIENT)
Dept: HEMATOLOGY ONCOLOGY | Facility: CLINIC | Age: 57
End: 2017-11-16

## 2017-11-16 VITALS
TEMPERATURE: 98.8 F | BODY MASS INDEX: 32.11 KG/M2 | RESPIRATION RATE: 16 BRPM | HEART RATE: 86 BPM | SYSTOLIC BLOOD PRESSURE: 123 MMHG | WEIGHT: 205.03 LBS | DIASTOLIC BLOOD PRESSURE: 76 MMHG | OXYGEN SATURATION: 94 %

## 2017-11-16 LAB
HCT VFR BLD CALC: 54.3 % — HIGH (ref 39–50)
HGB BLD-MCNC: 19.2 G/DL — CRITICAL HIGH (ref 13–17)
MCHC RBC-ENTMCNC: 29.9 PG — SIGNIFICANT CHANGE UP (ref 27–34)
MCHC RBC-ENTMCNC: 35.3 G/DL — SIGNIFICANT CHANGE UP (ref 32–36)
MCV RBC AUTO: 84.7 FL — SIGNIFICANT CHANGE UP (ref 80–100)
PLATELET # BLD AUTO: 294 K/UL — SIGNIFICANT CHANGE UP (ref 150–400)
RBC # BLD: 6.41 M/UL — HIGH (ref 4.2–5.8)
RBC # FLD: 12.3 % — SIGNIFICANT CHANGE UP (ref 10.3–14.5)
WBC # BLD: 7.3 K/UL — SIGNIFICANT CHANGE UP (ref 3.8–10.5)
WBC # FLD AUTO: 7.3 K/UL — SIGNIFICANT CHANGE UP (ref 3.8–10.5)

## 2017-11-29 ENCOUNTER — RESULT REVIEW (OUTPATIENT)
Age: 57
End: 2017-11-29

## 2017-11-29 ENCOUNTER — APPOINTMENT (OUTPATIENT)
Dept: INFUSION THERAPY | Facility: HOSPITAL | Age: 57
End: 2017-11-29

## 2017-11-29 LAB
HCT VFR BLD CALC: 49.3 % — SIGNIFICANT CHANGE UP (ref 39–50)
HGB BLD-MCNC: 17.9 G/DL — HIGH (ref 13–17)
MCHC RBC-ENTMCNC: 30.7 PG — SIGNIFICANT CHANGE UP (ref 27–34)
MCHC RBC-ENTMCNC: 36.3 G/DL — HIGH (ref 32–36)
MCV RBC AUTO: 84.7 FL — SIGNIFICANT CHANGE UP (ref 80–100)
PLATELET # BLD AUTO: 316 K/UL — SIGNIFICANT CHANGE UP (ref 150–400)
RBC # BLD: 5.82 M/UL — HIGH (ref 4.2–5.8)
RBC # FLD: 12.5 % — SIGNIFICANT CHANGE UP (ref 10.3–14.5)
WBC # BLD: 7.2 K/UL — SIGNIFICANT CHANGE UP (ref 3.8–10.5)
WBC # FLD AUTO: 7.2 K/UL — SIGNIFICANT CHANGE UP (ref 3.8–10.5)

## 2018-02-14 ENCOUNTER — APPOINTMENT (OUTPATIENT)
Dept: INTERNAL MEDICINE | Facility: HOSPITAL | Age: 58
End: 2018-02-14
Payer: COMMERCIAL

## 2018-02-14 ENCOUNTER — LABORATORY RESULT (OUTPATIENT)
Age: 58
End: 2018-02-14

## 2018-02-14 ENCOUNTER — OUTPATIENT (OUTPATIENT)
Dept: OUTPATIENT SERVICES | Facility: HOSPITAL | Age: 58
LOS: 1 days | End: 2018-02-14

## 2018-02-14 VITALS
SYSTOLIC BLOOD PRESSURE: 116 MMHG | WEIGHT: 199 LBS | BODY MASS INDEX: 31.23 KG/M2 | DIASTOLIC BLOOD PRESSURE: 78 MMHG | HEIGHT: 67 IN | HEART RATE: 72 BPM

## 2018-02-14 LAB
ALBUMIN SERPL ELPH-MCNC: 4.8 G/DL — SIGNIFICANT CHANGE UP (ref 3.3–5)
ALP SERPL-CCNC: 64 U/L — SIGNIFICANT CHANGE UP (ref 40–120)
ALT FLD-CCNC: 20 U/L — SIGNIFICANT CHANGE UP (ref 4–41)
AST SERPL-CCNC: 13 U/L — SIGNIFICANT CHANGE UP (ref 4–40)
BASOPHILS # BLD AUTO: 0.1 K/UL — SIGNIFICANT CHANGE UP (ref 0–0.2)
BASOPHILS NFR BLD AUTO: 1.4 % — SIGNIFICANT CHANGE UP (ref 0–2)
BILIRUB SERPL-MCNC: 0.7 MG/DL — SIGNIFICANT CHANGE UP (ref 0.2–1.2)
BUN SERPL-MCNC: 10 MG/DL — SIGNIFICANT CHANGE UP (ref 7–23)
CALCIUM SERPL-MCNC: 9.6 MG/DL — SIGNIFICANT CHANGE UP (ref 8.4–10.5)
CHLORIDE SERPL-SCNC: 101 MMOL/L — SIGNIFICANT CHANGE UP (ref 98–107)
CHOLEST SERPL-MCNC: 150 MG/DL — SIGNIFICANT CHANGE UP (ref 120–199)
CO2 SERPL-SCNC: 30 MMOL/L — SIGNIFICANT CHANGE UP (ref 22–31)
CREAT SERPL-MCNC: 1.05 MG/DL — SIGNIFICANT CHANGE UP (ref 0.5–1.3)
EOSINOPHIL # BLD AUTO: 0.15 K/UL — SIGNIFICANT CHANGE UP (ref 0–0.5)
EOSINOPHIL NFR BLD AUTO: 2 % — SIGNIFICANT CHANGE UP (ref 0–6)
GLUCOSE BLDC GLUCOMTR-MCNC: 82
GLUCOSE SERPL-MCNC: 77 MG/DL — SIGNIFICANT CHANGE UP (ref 70–99)
HBA1C BLD-MCNC: 6.2 % — HIGH (ref 4–5.6)
HCT VFR BLD CALC: 48.2 % — SIGNIFICANT CHANGE UP (ref 39–50)
HDLC SERPL-MCNC: 48 MG/DL — SIGNIFICANT CHANGE UP (ref 35–55)
HGB BLD-MCNC: 16.8 G/DL — SIGNIFICANT CHANGE UP (ref 13–17)
IMM GRANULOCYTES # BLD AUTO: 0.02 # — SIGNIFICANT CHANGE UP
IMM GRANULOCYTES NFR BLD AUTO: 0.3 % — SIGNIFICANT CHANGE UP (ref 0–1.5)
LIPID PNL WITH DIRECT LDL SERPL: 87 MG/DL — SIGNIFICANT CHANGE UP
LYMPHOCYTES # BLD AUTO: 3.43 K/UL — HIGH (ref 1–3.3)
LYMPHOCYTES # BLD AUTO: 46.4 % — HIGH (ref 13–44)
MCHC RBC-ENTMCNC: 28.8 PG — SIGNIFICANT CHANGE UP (ref 27–34)
MCHC RBC-ENTMCNC: 34.9 % — SIGNIFICANT CHANGE UP (ref 32–36)
MCV RBC AUTO: 82.5 FL — SIGNIFICANT CHANGE UP (ref 80–100)
MONOCYTES # BLD AUTO: 0.6 K/UL — SIGNIFICANT CHANGE UP (ref 0–0.9)
MONOCYTES NFR BLD AUTO: 8.1 % — SIGNIFICANT CHANGE UP (ref 2–14)
NEUTROPHILS # BLD AUTO: 3.1 K/UL — SIGNIFICANT CHANGE UP (ref 1.8–7.4)
NEUTROPHILS NFR BLD AUTO: 41.8 % — LOW (ref 43–77)
NRBC # FLD: 0 — SIGNIFICANT CHANGE UP
PLATELET # BLD AUTO: 359 K/UL — SIGNIFICANT CHANGE UP (ref 150–400)
PMV BLD: 8.8 FL — SIGNIFICANT CHANGE UP (ref 7–13)
POTASSIUM SERPL-MCNC: 4.2 MMOL/L — SIGNIFICANT CHANGE UP (ref 3.5–5.3)
POTASSIUM SERPL-SCNC: 4.2 MMOL/L — SIGNIFICANT CHANGE UP (ref 3.5–5.3)
PROT SERPL-MCNC: 8.1 G/DL — SIGNIFICANT CHANGE UP (ref 6–8.3)
RBC # BLD: 5.84 M/UL — HIGH (ref 4.2–5.8)
RBC # FLD: 13.5 % — SIGNIFICANT CHANGE UP (ref 10.3–14.5)
SODIUM SERPL-SCNC: 143 MMOL/L — SIGNIFICANT CHANGE UP (ref 135–145)
TRIGL SERPL-MCNC: 105 MG/DL — SIGNIFICANT CHANGE UP (ref 10–149)
WBC # BLD: 7.4 K/UL — SIGNIFICANT CHANGE UP (ref 3.8–10.5)
WBC # FLD AUTO: 7.4 K/UL — SIGNIFICANT CHANGE UP (ref 3.8–10.5)

## 2018-02-14 PROCEDURE — 99214 OFFICE O/P EST MOD 30 MIN: CPT | Mod: GC

## 2018-02-14 RX ORDER — BLOOD-GLUCOSE METER
W/DEVICE KIT MISCELLANEOUS
Qty: 1 | Refills: 0 | Status: ACTIVE | COMMUNITY
Start: 2018-02-14 | End: 1900-01-01

## 2018-02-15 LAB
CREAT UR-MCNC: 256 MG/DL — SIGNIFICANT CHANGE UP
MICROALBUMIN UR-MCNC: 0.6 MG/DL — SIGNIFICANT CHANGE UP
MICROALBUMIN/CREAT UR-RTO: 2 MG/G — SIGNIFICANT CHANGE UP (ref 0–30)

## 2018-02-23 DIAGNOSIS — E11.9 TYPE 2 DIABETES MELLITUS WITHOUT COMPLICATIONS: ICD-10-CM

## 2018-02-23 DIAGNOSIS — I10 ESSENTIAL (PRIMARY) HYPERTENSION: ICD-10-CM

## 2018-02-23 DIAGNOSIS — E78.5 HYPERLIPIDEMIA, UNSPECIFIED: ICD-10-CM

## 2018-02-23 DIAGNOSIS — D75.1 SECONDARY POLYCYTHEMIA: ICD-10-CM

## 2018-02-23 DIAGNOSIS — R07.89 OTHER CHEST PAIN: ICD-10-CM

## 2018-02-23 DIAGNOSIS — G47.33 OBSTRUCTIVE SLEEP APNEA (ADULT) (PEDIATRIC): ICD-10-CM

## 2018-02-23 DIAGNOSIS — H91.90 UNSPECIFIED HEARING LOSS, UNSPECIFIED EAR: ICD-10-CM

## 2018-03-01 ENCOUNTER — OUTPATIENT (OUTPATIENT)
Dept: OUTPATIENT SERVICES | Facility: HOSPITAL | Age: 58
LOS: 1 days | Discharge: HOME | End: 2018-03-01

## 2018-03-01 ENCOUNTER — RESULT REVIEW (OUTPATIENT)
Age: 58
End: 2018-03-01

## 2018-03-01 ENCOUNTER — APPOINTMENT (OUTPATIENT)
Dept: HEMATOLOGY ONCOLOGY | Facility: CLINIC | Age: 58
End: 2018-03-01

## 2018-03-01 DIAGNOSIS — D45 POLYCYTHEMIA VERA: ICD-10-CM

## 2018-03-01 LAB
HCT VFR BLD CALC: 47.3 % — SIGNIFICANT CHANGE UP (ref 39–50)
HGB BLD-MCNC: 17.1 G/DL — HIGH (ref 13–17)
MCHC RBC-ENTMCNC: 30.5 PG — SIGNIFICANT CHANGE UP (ref 27–34)
MCHC RBC-ENTMCNC: 36.1 G/DL — HIGH (ref 32–36)
MCV RBC AUTO: 84.4 FL — SIGNIFICANT CHANGE UP (ref 80–100)
PLATELET # BLD AUTO: 275 K/UL — SIGNIFICANT CHANGE UP (ref 150–400)
RBC # BLD: 5.61 M/UL — SIGNIFICANT CHANGE UP (ref 4.2–5.8)
RBC # FLD: 12.8 % — SIGNIFICANT CHANGE UP (ref 10.3–14.5)
WBC # BLD: 5.8 K/UL — SIGNIFICANT CHANGE UP (ref 3.8–10.5)
WBC # FLD AUTO: 5.8 K/UL — SIGNIFICANT CHANGE UP (ref 3.8–10.5)

## 2018-04-03 ENCOUNTER — NON-APPOINTMENT (OUTPATIENT)
Age: 58
End: 2018-04-03

## 2018-04-03 ENCOUNTER — APPOINTMENT (OUTPATIENT)
Dept: CARDIOLOGY | Facility: HOSPITAL | Age: 58
End: 2018-04-03

## 2018-04-03 VITALS
SYSTOLIC BLOOD PRESSURE: 125 MMHG | OXYGEN SATURATION: 96 % | WEIGHT: 198 LBS | RESPIRATION RATE: 16 BRPM | DIASTOLIC BLOOD PRESSURE: 80 MMHG | BODY MASS INDEX: 31.01 KG/M2 | HEART RATE: 77 BPM

## 2018-04-19 NOTE — H&P ADULT - PROBLEM SELECTOR PLAN 3
Ok baclofen 10 mg po TID prn spasticity #90, 6 refills   Fingersticks, HBA1c, insulin sliding scale, hold metformin

## 2018-04-24 ENCOUNTER — APPOINTMENT (OUTPATIENT)
Dept: OTOLARYNGOLOGY | Facility: CLINIC | Age: 58
End: 2018-04-24
Payer: MEDICAID

## 2018-04-24 VITALS
HEIGHT: 67 IN | RESPIRATION RATE: 18 BRPM | BODY MASS INDEX: 31.08 KG/M2 | HEART RATE: 73 BPM | DIASTOLIC BLOOD PRESSURE: 89 MMHG | WEIGHT: 198 LBS | SYSTOLIC BLOOD PRESSURE: 149 MMHG

## 2018-04-24 PROCEDURE — 92567 TYMPANOMETRY: CPT

## 2018-04-24 PROCEDURE — 92557 COMPREHENSIVE HEARING TEST: CPT

## 2018-04-24 PROCEDURE — 99204 OFFICE O/P NEW MOD 45 MIN: CPT | Mod: 25

## 2018-05-02 ENCOUNTER — OUTPATIENT (OUTPATIENT)
Dept: OUTPATIENT SERVICES | Facility: HOSPITAL | Age: 58
LOS: 1 days | Discharge: HOME | End: 2018-05-02

## 2018-05-02 DIAGNOSIS — D45 POLYCYTHEMIA VERA: ICD-10-CM

## 2018-05-04 ENCOUNTER — APPOINTMENT (OUTPATIENT)
Dept: INFUSION THERAPY | Facility: HOSPITAL | Age: 58
End: 2018-05-04

## 2018-05-31 ENCOUNTER — APPOINTMENT (OUTPATIENT)
Dept: INTERNAL MEDICINE | Facility: HOSPITAL | Age: 58
End: 2018-05-31

## 2018-07-02 ENCOUNTER — OUTPATIENT (OUTPATIENT)
Dept: OUTPATIENT SERVICES | Facility: HOSPITAL | Age: 58
LOS: 1 days | End: 2018-07-02

## 2018-07-02 ENCOUNTER — APPOINTMENT (OUTPATIENT)
Dept: INTERNAL MEDICINE | Facility: HOSPITAL | Age: 58
End: 2018-07-02
Payer: MEDICAID

## 2018-07-02 VITALS — HEIGHT: 67 IN | BODY MASS INDEX: 30.92 KG/M2 | WEIGHT: 197 LBS

## 2018-07-02 VITALS — DIASTOLIC BLOOD PRESSURE: 80 MMHG | SYSTOLIC BLOOD PRESSURE: 120 MMHG

## 2018-07-02 DIAGNOSIS — R73.03 PREDIABETES.: ICD-10-CM

## 2018-07-02 PROCEDURE — 99213 OFFICE O/P EST LOW 20 MIN: CPT | Mod: GE

## 2018-07-02 RX ORDER — POLYETHYLENE GLYCOL 3350 17 G/17G
17 POWDER, FOR SOLUTION ORAL
Qty: 90 | Refills: 3 | Status: DISCONTINUED | COMMUNITY
Start: 2017-06-12 | End: 2018-07-02

## 2018-07-02 RX ORDER — PSYLLIUM SEED
48.57 PACKET (EA) ORAL
Qty: 1 | Refills: 3 | Status: DISCONTINUED | COMMUNITY
Start: 2017-06-12 | End: 2018-07-02

## 2018-07-03 DIAGNOSIS — E11.9 TYPE 2 DIABETES MELLITUS WITHOUT COMPLICATIONS: ICD-10-CM

## 2018-07-03 DIAGNOSIS — I10 ESSENTIAL (PRIMARY) HYPERTENSION: ICD-10-CM

## 2018-07-03 DIAGNOSIS — G47.33 OBSTRUCTIVE SLEEP APNEA (ADULT) (PEDIATRIC): ICD-10-CM

## 2018-07-09 NOTE — PLAN
[FreeTextEntry1] : 1. Travel medicine advice - \par Patient declined vaccination for hep A and typhoid.\par \par 2. T2DM - \par Ophthalmology and podiatry referrals\par c/w metformin 1000mg\par repeat hba1c next month\par \par 3. HTN\par 120/80 (well controlled)\par c/w Amlodipine 10mg\par c/w Lisinopril 10mg\par \par 4. Refills\par Received 90 day supply of all meds with 2 refills on 2/18. More refills not needed at this time. \par

## 2018-07-09 NOTE — PHYSICAL EXAM
[No Acute Distress] : no acute distress [Well Nourished] : well nourished [Well Developed] : well developed [Well-Appearing] : well-appearing [Fundoscopic Exam Performed] : fundoscopic ~T exam ~C was performed [No JVD] : no jugular venous distention [Supple] : supple [No Lymphadenopathy] : no lymphadenopathy [Thyroid Normal, No Nodules] : the thyroid was normal and there were no nodules present [No Respiratory Distress] : no respiratory distress  [Clear to Auscultation] : lungs were clear to auscultation bilaterally [No Accessory Muscle Use] : no accessory muscle use [Normal Rate] : normal rate  [Regular Rhythm] : with a regular rhythm [Normal S1, S2] : normal S1 and S2 [No Murmur] : no murmur heard [Pedal Pulses Present] : the pedal pulses are present [No Edema] : there was no peripheral edema [No Extremity Clubbing/Cyanosis] : no extremity clubbing/cyanosis [Soft] : abdomen soft [Non Tender] : non-tender [Non-distended] : non-distended [No Masses] : no abdominal mass palpated [No HSM] : no HSM [Normal Bowel Sounds] : normal bowel sounds [de-identified] : icteric sclerae [de-identified] : Malampati 3

## 2018-07-09 NOTE — ASSESSMENT
[FreeTextEntry1] : 57 yo Geovanny M w/t a PMHx of T2DM (Hba1c 6.2 2/18), HTN, HLD, JOHNNIE, splenic and renal infarcts (was on coumadin, but stopped 2/2 hemarthrosis) who came for travel medicine advice as well as medication refills.

## 2018-07-09 NOTE — REVIEW OF SYSTEMS
[Constipation] : constipation [Negative] : Heme/Lymph [Fever] : no fever [Chills] : no chills [Night Sweats] : no night sweats [Chest Pain] : no chest pain [Palpitations] : no palpitations [Orthopena] : no orthopnea [Paroysmal Nocturnal Dyspnea] : no paroysmal nocturnal dyspnea [Shortness Of Breath] : no shortness of breath [Wheezing] : no wheezing [Cough] : no cough [Dyspnea on Exertion] : not dyspnea on exertion [Abdominal Pain] : no abdominal pain [Nausea] : no nausea [Diarrhea] : no diarrhea [Vomiting] : no vomiting [Dysuria] : no dysuria [Hematuria] : no hematuria [Joint Pain] : no joint pain [Back Pain] : no back pain [Headache] : no headache

## 2018-07-09 NOTE — HISTORY OF PRESENT ILLNESS
[FreeTextEntry1] : Traveling to [place], med refils [de-identified] : 59 yo Geovanny M w/t a PMHx of T2DM (Hba1c 6.2 2/18), HTN, HLD, JOHNNIE, splenic and renal infarcts (was on coumadin, but stopped 2/2 hemarthrosis) who came for travel medicine advice as well as medication refills. He will be leaving for [place] tomorrow for 3 weeks.  Today he has no new concerns. No SOB, MONROY, or CP. No recent fever sweats or chills. Says it's been many years since his last diabetic foot and eye exam. Has had hba1c and microalbumin checked 2/18.

## 2018-07-09 NOTE — END OF VISIT
[FreeTextEntry3] : 59yo M with pMH of htn, hld, poly vera, T2DM (6.2), splenic and renal and splenic  infarct, MI, cirrhosis and esophageal varices who comes in for follow up. here for travel consultation before he travels to [place]. does not had foot or vision testing in 1 year. \par \par ROS: no sob, cp, n/v. \par 120/80, well appearing, \par T2Dm - metformin 1000, microalbumin checked this yeat\par HTn -amlodipine, lisnopril\par

## 2018-08-07 ENCOUNTER — OUTPATIENT (OUTPATIENT)
Dept: OUTPATIENT SERVICES | Facility: HOSPITAL | Age: 58
LOS: 1 days | Discharge: HOME | End: 2018-08-07

## 2018-08-07 DIAGNOSIS — D45 POLYCYTHEMIA VERA: ICD-10-CM

## 2018-08-31 ENCOUNTER — EMERGENCY (EMERGENCY)
Facility: HOSPITAL | Age: 58
LOS: 1 days | Discharge: HOME | End: 2018-08-31
Attending: EMERGENCY MEDICINE | Admitting: EMERGENCY MEDICINE
Payer: MEDICAID

## 2018-08-31 VITALS
OXYGEN SATURATION: 99 % | RESPIRATION RATE: 17 BRPM | SYSTOLIC BLOOD PRESSURE: 160 MMHG | TEMPERATURE: 98 F | DIASTOLIC BLOOD PRESSURE: 94 MMHG | HEART RATE: 65 BPM

## 2018-08-31 VITALS
RESPIRATION RATE: 16 BRPM | DIASTOLIC BLOOD PRESSURE: 93 MMHG | HEART RATE: 56 BPM | SYSTOLIC BLOOD PRESSURE: 149 MMHG | OXYGEN SATURATION: 99 %

## 2018-08-31 LAB
ALBUMIN SERPL ELPH-MCNC: 4.7 G/DL — SIGNIFICANT CHANGE UP (ref 3.3–5)
ALP SERPL-CCNC: 63 U/L — SIGNIFICANT CHANGE UP (ref 40–120)
ALT FLD-CCNC: 19 U/L — SIGNIFICANT CHANGE UP (ref 4–41)
AST SERPL-CCNC: 16 U/L — SIGNIFICANT CHANGE UP (ref 4–40)
BASOPHILS # BLD AUTO: 0.06 K/UL — SIGNIFICANT CHANGE UP (ref 0–0.2)
BASOPHILS NFR BLD AUTO: 1.1 % — SIGNIFICANT CHANGE UP (ref 0–2)
BILIRUB SERPL-MCNC: 0.8 MG/DL — SIGNIFICANT CHANGE UP (ref 0.2–1.2)
BUN SERPL-MCNC: 10 MG/DL — SIGNIFICANT CHANGE UP (ref 7–23)
CALCIUM SERPL-MCNC: 9.7 MG/DL — SIGNIFICANT CHANGE UP (ref 8.4–10.5)
CHLORIDE SERPL-SCNC: 101 MMOL/L — SIGNIFICANT CHANGE UP (ref 98–107)
CO2 SERPL-SCNC: 27 MMOL/L — SIGNIFICANT CHANGE UP (ref 22–31)
CREAT SERPL-MCNC: 1.01 MG/DL — SIGNIFICANT CHANGE UP (ref 0.5–1.3)
D DIMER BLD IA.RAPID-MCNC: < 150 NG/ML — SIGNIFICANT CHANGE UP
EOSINOPHIL # BLD AUTO: 0.16 K/UL — SIGNIFICANT CHANGE UP (ref 0–0.5)
EOSINOPHIL NFR BLD AUTO: 3 % — SIGNIFICANT CHANGE UP (ref 0–6)
GLUCOSE SERPL-MCNC: 113 MG/DL — HIGH (ref 70–99)
HCT VFR BLD CALC: 45.8 % — SIGNIFICANT CHANGE UP (ref 39–50)
HGB BLD-MCNC: 16.4 G/DL — SIGNIFICANT CHANGE UP (ref 13–17)
IMM GRANULOCYTES # BLD AUTO: 0.01 # — SIGNIFICANT CHANGE UP
IMM GRANULOCYTES NFR BLD AUTO: 0.2 % — SIGNIFICANT CHANGE UP (ref 0–1.5)
INR BLD: 0.96 — SIGNIFICANT CHANGE UP (ref 0.88–1.17)
LYMPHOCYTES # BLD AUTO: 2.64 K/UL — SIGNIFICANT CHANGE UP (ref 1–3.3)
LYMPHOCYTES # BLD AUTO: 49.1 % — HIGH (ref 13–44)
MCHC RBC-ENTMCNC: 29.5 PG — SIGNIFICANT CHANGE UP (ref 27–34)
MCHC RBC-ENTMCNC: 35.8 % — SIGNIFICANT CHANGE UP (ref 32–36)
MCV RBC AUTO: 82.5 FL — SIGNIFICANT CHANGE UP (ref 80–100)
MONOCYTES # BLD AUTO: 0.59 K/UL — SIGNIFICANT CHANGE UP (ref 0–0.9)
MONOCYTES NFR BLD AUTO: 11 % — SIGNIFICANT CHANGE UP (ref 2–14)
NEUTROPHILS # BLD AUTO: 1.92 K/UL — SIGNIFICANT CHANGE UP (ref 1.8–7.4)
NEUTROPHILS NFR BLD AUTO: 35.6 % — LOW (ref 43–77)
NRBC # FLD: 0 — SIGNIFICANT CHANGE UP
PLATELET # BLD AUTO: 279 K/UL — SIGNIFICANT CHANGE UP (ref 150–400)
PMV BLD: 8.5 FL — SIGNIFICANT CHANGE UP (ref 7–13)
POTASSIUM SERPL-MCNC: 4.4 MMOL/L — SIGNIFICANT CHANGE UP (ref 3.5–5.3)
POTASSIUM SERPL-SCNC: 4.4 MMOL/L — SIGNIFICANT CHANGE UP (ref 3.5–5.3)
PROT SERPL-MCNC: 8 G/DL — SIGNIFICANT CHANGE UP (ref 6–8.3)
PROTHROM AB SERPL-ACNC: 10.7 SEC — SIGNIFICANT CHANGE UP (ref 9.8–13.1)
RBC # BLD: 5.55 M/UL — SIGNIFICANT CHANGE UP (ref 4.2–5.8)
RBC # FLD: 13.5 % — SIGNIFICANT CHANGE UP (ref 10.3–14.5)
SODIUM SERPL-SCNC: 140 MMOL/L — SIGNIFICANT CHANGE UP (ref 135–145)
TROPONIN T, HIGH SENSITIVITY: 6 NG/L — SIGNIFICANT CHANGE UP (ref ?–14)
TROPONIN T, HIGH SENSITIVITY: < 6 NG/L — SIGNIFICANT CHANGE UP (ref ?–14)
WBC # BLD: 5.38 K/UL — SIGNIFICANT CHANGE UP (ref 3.8–10.5)
WBC # FLD AUTO: 5.38 K/UL — SIGNIFICANT CHANGE UP (ref 3.8–10.5)

## 2018-08-31 PROCEDURE — 99284 EMERGENCY DEPT VISIT MOD MDM: CPT

## 2018-08-31 PROCEDURE — 71046 X-RAY EXAM CHEST 2 VIEWS: CPT | Mod: 26

## 2018-08-31 RX ORDER — KETOROLAC TROMETHAMINE 30 MG/ML
15 SYRINGE (ML) INJECTION ONCE
Qty: 0 | Refills: 0 | Status: DISCONTINUED | OUTPATIENT
Start: 2018-08-31 | End: 2018-08-31

## 2018-08-31 RX ADMIN — Medication 15 MILLIGRAM(S): at 09:36

## 2018-08-31 NOTE — ED ADULT NURSE NOTE - NSIMPLEMENTINTERV_GEN_ALL_ED
Implemented All Universal Safety Interventions:  [place] to call system. Call bell, personal items and telephone within reach. Instruct patient to call for assistance. Room bathroom lighting operational. Non-slip footwear when patient is off stretcher. Physically safe environment: no spills, clutter or unnecessary equipment. Stretcher in lowest position, wheels locked, appropriate side rails in place.

## 2018-08-31 NOTE — ED ADULT NURSE NOTE - OBJECTIVE STATEMENT
pt in intake. alert,oriented x3. skin warm,dry. c/o r shoulder pains radiating to neck, r chest area  x past 3 wks. med  presently as ordered. placed c monitor iv access,labs sent. will continue to monitor. pt taken to xray

## 2018-08-31 NOTE — ED ADULT NURSE REASSESSMENT NOTE - NS ED NURSE REASSESS COMMENT FT1
A&Ox3, respirations even and unlabored, ambulatory, reports improvement of symptoms, IV removed denies pain at site. Discharge instructions provided, patient verbalized understanding.

## 2018-08-31 NOTE — ED ADULT TRIAGE NOTE - CHIEF COMPLAINT QUOTE
Pt c/o right sided back rad to shoulder, neck, chest since 3 weeks ago. Pt reports neck feels stiff. Denies any injury, SOB, dizziness. EKG in progress.

## 2018-08-31 NOTE — ED PROVIDER NOTE - OBJECTIVE STATEMENT
58yM with pmh of HTN, prediabetic and polycythemia vera (undergoing phlebotomy every 2 months, 58yM with pmh of HTN, prediabetic and polycythemia vera presents with right upper back pain (3 weeks duration) with right neck and jaw stiffness along with intermittent right chest pain (4day duration). Patient states that he came in today due to the new presentation of neck and jaw stiffness since Monday. Patient states he usually undergo phlebotomy for his PCV every two months but missed his last one due to traveling. Patient endorses some constipation but denies any fevers, headaches, n/v, sob, abd pain. 58yM with pmh of HTN, prediabetic and polycythemia vera presents with right upper back pain (3 weeks duration) with right neck and jaw stiffness along with intermittent right chest pain (4day duration). Patient states that he came in today due to the new presentation of neck and jaw stiffness since Monday. Patient states he usually undergo phlebotomy for his PCV every two months but missed his last one due to traveling. Patient endorses some constipation but denies any fevers, headaches, n/v, sob, abd pain.  He feels that this is pain in his muscles.

## 2018-08-31 NOTE — ED PROVIDER NOTE - MEDICAL DECISION MAKING DETAILS
58yM with pmh of HTN, prediabetic and polycythemia vera presents with right upper back pain (3 weeks duration) with right neck and jaw stiffness along with intermittent right chest pain (4day duration) with non remarkable physical exam.  Plan: Labs, 58yM with pmh of HTN, prediabetic and polycythemia vera presents with right upper back pain (3 weeks duration) with right neck and jaw stiffness along with intermittent right chest pain (4day duration) with non remarkable physical exam.  Plan: Labs, pain management, coags, trop, ddimer  R/o: MSK vs ACS vs PE 58yM with pmh of HTN, prediabetic and polycythemia vera presents with right upper back pain (3 weeks duration) with right neck and jaw stiffness along with intermittent right chest pain (4day duration) with non remarkable physical exam.  No SOB or exertional component.  Low suspicion for ACS or PE but given PCV will check labs.  Plan: Labs, pain management, coags, trop, ddimer  R/o: MSK vs ACS vs PE

## 2018-08-31 NOTE — ED PROVIDER NOTE - NS ED ROS FT
GENERAL: No fever or chills, EYES: no change in vision, HEENT: no trouble swallowing or speaking, CARDIAC: no chest pain, PULMONARY: no cough or SOB, GI: no abdominal pain, no nausea, no vomiting, no diarrhea, +constipation, : No changes in urination, SKIN: no rashes, NEURO: no headache,  MSK: +right upper back pain, +neck and jaw stiffness. No joint pain ~Mauricio Kemp M.D. Resident

## 2018-08-31 NOTE — ED PROVIDER NOTE - ATTENDING CONTRIBUTION TO CARE
Dr. Olivera: 59 yo male with PMH HTN, prediabetes and polycythemia vera (usually has phlebotomy every 2 months but missed last appointment so has not had phlebotomy in almost 4 months), in ED with 3 weeks of building pain to right upper back, now radiating into right neck/jaw/chest.  Pain worse with movement of neck.  No associated SOB or exertional component.  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  On exam pt well appearing, in NAD, heart RRR, lungs CTAB, abd NTND, extremities without swelling, strength 5/5 in all extremities and skin without rash.  Right thoracic paraspinal back and right paracervical muscles with mild TTP, worse with movement of neck.  No midline neck/spine TTP.  Likely musculoskeletal pain, low suspicion for ACS or PE, however will check labs due to history of polycythemia vera.

## 2018-09-06 ENCOUNTER — APPOINTMENT (OUTPATIENT)
Dept: HEMATOLOGY ONCOLOGY | Facility: CLINIC | Age: 58
End: 2018-09-06

## 2018-10-12 ENCOUNTER — OUTPATIENT (OUTPATIENT)
Dept: OUTPATIENT SERVICES | Facility: HOSPITAL | Age: 58
LOS: 1 days | Discharge: HOME | End: 2018-10-12
Payer: MEDICAID

## 2018-10-12 DIAGNOSIS — D45 POLYCYTHEMIA VERA: ICD-10-CM

## 2018-10-15 ENCOUNTER — OUTPATIENT (OUTPATIENT)
Dept: OUTPATIENT SERVICES | Facility: HOSPITAL | Age: 58
LOS: 1 days | End: 2018-10-15
Payer: MEDICAID

## 2018-10-15 ENCOUNTER — RESULT REVIEW (OUTPATIENT)
Age: 58
End: 2018-10-15

## 2018-10-15 ENCOUNTER — APPOINTMENT (OUTPATIENT)
Dept: HEMATOLOGY ONCOLOGY | Facility: CLINIC | Age: 58
End: 2018-10-15

## 2018-10-15 VITALS
SYSTOLIC BLOOD PRESSURE: 147 MMHG | DIASTOLIC BLOOD PRESSURE: 85 MMHG | HEART RATE: 78 BPM | OXYGEN SATURATION: 95 % | TEMPERATURE: 97.8 F | BODY MASS INDEX: 31.77 KG/M2 | RESPIRATION RATE: 16 BRPM | WEIGHT: 202.82 LBS

## 2018-10-15 DIAGNOSIS — D75.1 SECONDARY POLYCYTHEMIA: ICD-10-CM

## 2018-10-15 LAB
HCT VFR BLD CALC: 48.3 % — SIGNIFICANT CHANGE UP (ref 39–50)
HGB BLD-MCNC: 16.6 G/DL — SIGNIFICANT CHANGE UP (ref 13–17)
MCHC RBC-ENTMCNC: 29.3 PG — SIGNIFICANT CHANGE UP (ref 27–34)
MCHC RBC-ENTMCNC: 34.3 G/DL — SIGNIFICANT CHANGE UP (ref 32–36)
MCV RBC AUTO: 85.3 FL — SIGNIFICANT CHANGE UP (ref 80–100)
PLATELET # BLD AUTO: 315 K/UL — SIGNIFICANT CHANGE UP (ref 150–400)
RBC # BLD: 5.66 M/UL — SIGNIFICANT CHANGE UP (ref 4.2–5.8)
RBC # FLD: 12.5 % — SIGNIFICANT CHANGE UP (ref 10.3–14.5)
WBC # BLD: 5.6 K/UL — SIGNIFICANT CHANGE UP (ref 3.8–10.5)
WBC # FLD AUTO: 5.6 K/UL — SIGNIFICANT CHANGE UP (ref 3.8–10.5)

## 2018-10-15 PROCEDURE — 88187 FLOWCYTOMETRY/READ 2-8: CPT

## 2018-10-15 PROCEDURE — 88189 FLOWCYTOMETRY/READ 16 & >: CPT | Mod: 59

## 2018-10-16 LAB — TM INTERPRETATION: SIGNIFICANT CHANGE UP

## 2018-10-18 LAB — TM INTERPRETATION: SIGNIFICANT CHANGE UP

## 2018-11-05 ENCOUNTER — APPOINTMENT (OUTPATIENT)
Dept: HEMATOLOGY ONCOLOGY | Facility: CLINIC | Age: 58
End: 2018-11-05

## 2018-11-09 ENCOUNTER — OUTPATIENT (OUTPATIENT)
Dept: OUTPATIENT SERVICES | Facility: HOSPITAL | Age: 58
LOS: 1 days | Discharge: HOME | End: 2018-11-09

## 2018-11-09 DIAGNOSIS — D45 POLYCYTHEMIA VERA: ICD-10-CM

## 2018-11-12 ENCOUNTER — RESULT REVIEW (OUTPATIENT)
Age: 58
End: 2018-11-12

## 2018-11-12 ENCOUNTER — APPOINTMENT (OUTPATIENT)
Dept: HEMATOLOGY ONCOLOGY | Facility: CLINIC | Age: 58
End: 2018-11-12

## 2018-11-12 LAB
HCT VFR BLD CALC: 47.6 % — SIGNIFICANT CHANGE UP (ref 39–50)
HGB BLD-MCNC: 17.3 G/DL — HIGH (ref 13–17)
MCHC RBC-ENTMCNC: 30.6 PG — SIGNIFICANT CHANGE UP (ref 27–34)
MCHC RBC-ENTMCNC: 36.3 G/DL — HIGH (ref 32–36)
MCV RBC AUTO: 84.3 FL — SIGNIFICANT CHANGE UP (ref 80–100)
PLATELET # BLD AUTO: 320 K/UL — SIGNIFICANT CHANGE UP (ref 150–400)
RBC # BLD: 5.64 M/UL — SIGNIFICANT CHANGE UP (ref 4.2–5.8)
RBC # FLD: 12.4 % — SIGNIFICANT CHANGE UP (ref 10.3–14.5)
WBC # BLD: 6.7 K/UL — SIGNIFICANT CHANGE UP (ref 3.8–10.5)
WBC # FLD AUTO: 6.7 K/UL — SIGNIFICANT CHANGE UP (ref 3.8–10.5)

## 2018-11-13 NOTE — ED PROVIDER NOTE - PHYSICAL EXAMINATION
CCU Gen: AAOx3, non-toxic  Head: NCAT  HEENT: EOMI, oral mucosa moist, normal conjunctiva, no nunchal rigidity  Lung: CTAB, no respiratory distress, no wheezes/rhonchi/rales B/L, speaking in full sentences  CV: RRR, no murmurs, rubs or gallops  Abd: soft, NTND, no guarding, no CVA tenderness  MSK: FROM, no visible deformities, mild tenderness to palpation of right upper back  Neuro: No focal sensory or motor deficits  Skin: Warm, well perfused, no rash  Psych: normal affect.   ~Mauricio Kemp M.D. Resident Gen: AAOx3, non-toxic  Head: NCAT  HEENT: EOMI, oral mucosa moist, normal conjunctiva, no nunchal rigidity  Lung: CTAB, no respiratory distress, no wheezes/rhonchi/rales B/L, speaking in full sentences  CV: RRR, no murmurs, rubs or gallops  Abd: soft, NTND, no guarding, no CVA tenderness  MSK: FROM, no visible deformities, mild tenderness to palpation of right upper back; no midline neck/spine TTP  Neuro: No focal sensory or motor deficits  Skin: Warm, well perfused, no rash  Psych: normal affect.   ~Mauricio Kemp M.D. Resident

## 2018-11-26 ENCOUNTER — APPOINTMENT (OUTPATIENT)
Dept: OPHTHALMOLOGY | Facility: CLINIC | Age: 58
End: 2018-11-26
Payer: MEDICAID

## 2018-11-26 PROCEDURE — 92004 COMPRE OPH EXAM NEW PT 1/>: CPT

## 2018-11-26 PROCEDURE — 92133 CPTRZD OPH DX IMG PST SGM ON: CPT

## 2018-11-26 PROCEDURE — 92132 CPTRZD OPH DX IMG ANT SGM: CPT

## 2019-01-25 ENCOUNTER — RX RENEWAL (OUTPATIENT)
Age: 59
End: 2019-01-25

## 2019-02-25 ENCOUNTER — APPOINTMENT (OUTPATIENT)
Dept: OPHTHALMOLOGY | Facility: CLINIC | Age: 59
End: 2019-02-25

## 2019-04-02 ENCOUNTER — RESULT REVIEW (OUTPATIENT)
Age: 59
End: 2019-04-02

## 2019-04-02 ENCOUNTER — APPOINTMENT (OUTPATIENT)
Dept: HEMATOLOGY ONCOLOGY | Facility: CLINIC | Age: 59
End: 2019-04-02

## 2019-04-02 ENCOUNTER — OUTPATIENT (OUTPATIENT)
Dept: OUTPATIENT SERVICES | Facility: HOSPITAL | Age: 59
LOS: 1 days | Discharge: HOME | End: 2019-04-02

## 2019-04-02 ENCOUNTER — OTHER (OUTPATIENT)
Age: 59
End: 2019-04-02

## 2019-04-02 DIAGNOSIS — D45 POLYCYTHEMIA VERA: ICD-10-CM

## 2019-04-02 LAB
HCT VFR BLD CALC: 47.1 % — SIGNIFICANT CHANGE UP (ref 39–50)
HGB BLD-MCNC: 16.7 G/DL — SIGNIFICANT CHANGE UP (ref 13–17)
MCHC RBC-ENTMCNC: 30.2 PG — SIGNIFICANT CHANGE UP (ref 27–34)
MCHC RBC-ENTMCNC: 35.4 G/DL — SIGNIFICANT CHANGE UP (ref 32–36)
MCV RBC AUTO: 85.2 FL — SIGNIFICANT CHANGE UP (ref 80–100)
PLATELET # BLD AUTO: 346 K/UL — SIGNIFICANT CHANGE UP (ref 150–400)
RBC # BLD: 5.53 M/UL — SIGNIFICANT CHANGE UP (ref 4.2–5.8)
RBC # FLD: 12.4 % — SIGNIFICANT CHANGE UP (ref 10.3–14.5)
WBC # BLD: 6.4 K/UL — SIGNIFICANT CHANGE UP (ref 3.8–10.5)
WBC # FLD AUTO: 6.4 K/UL — SIGNIFICANT CHANGE UP (ref 3.8–10.5)

## 2019-04-03 ENCOUNTER — RX RENEWAL (OUTPATIENT)
Age: 59
End: 2019-04-03

## 2019-04-03 LAB
BASOPHILS # BLD AUTO: 0.1 K/UL — SIGNIFICANT CHANGE UP (ref 0–0.2)
BASOPHILS NFR BLD AUTO: 1.2 % — SIGNIFICANT CHANGE UP (ref 0–2)
EOSINOPHIL # BLD AUTO: 0.1 K/UL — SIGNIFICANT CHANGE UP (ref 0–0.5)
EOSINOPHIL NFR BLD AUTO: 1.9 % — SIGNIFICANT CHANGE UP (ref 0–6)
LYMPHOCYTES # BLD AUTO: 3.1 K/UL — SIGNIFICANT CHANGE UP (ref 1–3.3)
LYMPHOCYTES # BLD AUTO: 48 % — HIGH (ref 13–44)
MONOCYTES # BLD AUTO: 0.5 K/UL — SIGNIFICANT CHANGE UP (ref 0–0.9)
MONOCYTES NFR BLD AUTO: 8.4 % — SIGNIFICANT CHANGE UP (ref 2–14)
NEUTROPHILS # BLD AUTO: 2.6 K/UL — SIGNIFICANT CHANGE UP (ref 1.8–7.4)
NEUTROPHILS NFR BLD AUTO: 40.5 % — LOW (ref 43–77)

## 2019-06-05 ENCOUNTER — OUTPATIENT (OUTPATIENT)
Dept: OUTPATIENT SERVICES | Facility: HOSPITAL | Age: 59
LOS: 1 days | Discharge: HOME | End: 2019-06-05

## 2019-06-05 DIAGNOSIS — D45 POLYCYTHEMIA VERA: ICD-10-CM

## 2019-06-10 ENCOUNTER — APPOINTMENT (OUTPATIENT)
Dept: HEMATOLOGY ONCOLOGY | Facility: CLINIC | Age: 59
End: 2019-06-10

## 2019-06-10 ENCOUNTER — RESULT REVIEW (OUTPATIENT)
Age: 59
End: 2019-06-10

## 2019-06-10 VITALS
SYSTOLIC BLOOD PRESSURE: 148 MMHG | RESPIRATION RATE: 15 BRPM | TEMPERATURE: 98.1 F | OXYGEN SATURATION: 98 % | DIASTOLIC BLOOD PRESSURE: 80 MMHG | BODY MASS INDEX: 30.66 KG/M2 | WEIGHT: 195.77 LBS | HEART RATE: 83 BPM

## 2019-06-10 LAB
HCT VFR BLD CALC: 46.9 % — SIGNIFICANT CHANGE UP (ref 39–50)
HGB BLD-MCNC: 17.3 G/DL — HIGH (ref 13–17)
MCHC RBC-ENTMCNC: 31 PG — SIGNIFICANT CHANGE UP (ref 27–34)
MCHC RBC-ENTMCNC: 36.8 G/DL — HIGH (ref 32–36)
MCV RBC AUTO: 84.2 FL — SIGNIFICANT CHANGE UP (ref 80–100)
PLATELET # BLD AUTO: 309 K/UL — SIGNIFICANT CHANGE UP (ref 150–400)
RBC # BLD: 5.57 M/UL — SIGNIFICANT CHANGE UP (ref 4.2–5.8)
RBC # FLD: 12.8 % — SIGNIFICANT CHANGE UP (ref 10.3–14.5)
WBC # BLD: 5.8 K/UL — SIGNIFICANT CHANGE UP (ref 3.8–10.5)
WBC # FLD AUTO: 5.8 K/UL — SIGNIFICANT CHANGE UP (ref 3.8–10.5)

## 2019-06-13 NOTE — ASSESSMENT
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 he DOES NOT wear CPAP. i advised him we will refer him back to pulmonary so he can restart treatment for his JOHNNIE. to improve his polycythemia is to treat the underlying cause, which is the hypoxia from his JOHNNIE driving erythrocytosis likely.\par pulm referral made.  \par JAK2, BCR ABL negative.\par we can check carboxyhemoglobin at next visit\par EPO level 15 and 12 checked in 2012 and 2015\par Bone marrow biopsy normal in April 2014. \par Abdominal CT does not show any abdominal malignancies as cause of polycythemia.\par compliant with ASA 81.\par I will speak to longitudinal attending, Dr. Flores, with regards to future follow up with us. if his polycythemia is secondary to JOHNNIE, the JOHNNIE needs to be treated.\par \par \par splenic and renal infarcts- diagnosed in 2012, was on coumadin. hypercoag workup: anticardiolipin, DRVVT, prothrombin gene mutation, factor V leiden, MTHFR, PNH negative. could have been due to atheroma? KASIA done in 2012 showing minimal atheroma in aortic root/descending aorta.\par continue aspirin 81mg PO daily. \par \par Patient discussed in fellows clinic with Dr. Rees , followed longitudinally by Dr. Flores. \par

## 2019-06-13 NOTE — RESULTS/DATA
[FreeTextEntry1] : 6-10-10 \par \par wbc  5.8\par hgb 17.3\par hct 46\par plt 309\par \par \par EGD 2014\par Impression:          - Normal oropharynx.\par                      - Non-bleeding small (< 5 mm) esophageal varices.\par \par COLONOSCOPY 2014\par       Findings:\par      Normal colonosopcy to the IC valve.The perianal and digital rectal \par      examinations were normal.\par      The ascending colon revealed significantly excessive looping.\par      The exam was otherwise normal throughout the examined colon.\par      The retroflexed view of the distal rectum and anal verge was normal and \par      showed no anal or rectal abnormalities.

## 2019-07-08 ENCOUNTER — APPOINTMENT (OUTPATIENT)
Dept: PULMONOLOGY | Facility: CLINIC | Age: 59
End: 2019-07-08
Payer: MEDICAID

## 2019-07-08 VITALS
BODY MASS INDEX: 31.08 KG/M2 | HEIGHT: 67 IN | SYSTOLIC BLOOD PRESSURE: 144 MMHG | TEMPERATURE: 97.5 F | DIASTOLIC BLOOD PRESSURE: 82 MMHG | OXYGEN SATURATION: 95 % | HEART RATE: 75 BPM | WEIGHT: 198 LBS | RESPIRATION RATE: 18 BRPM

## 2019-07-08 PROCEDURE — 36600 WITHDRAWAL OF ARTERIAL BLOOD: CPT | Mod: 59

## 2019-07-08 PROCEDURE — ZZZZZ: CPT

## 2019-07-08 PROCEDURE — 99204 OFFICE O/P NEW MOD 45 MIN: CPT | Mod: 25

## 2019-07-08 PROCEDURE — 94729 DIFFUSING CAPACITY: CPT

## 2019-07-08 PROCEDURE — 94726 PLETHYSMOGRAPHY LUNG VOLUMES: CPT

## 2019-07-08 PROCEDURE — 94060 EVALUATION OF WHEEZING: CPT

## 2019-07-08 PROCEDURE — 82803 BLOOD GASES ANY COMBINATION: CPT

## 2019-07-10 NOTE — ASSESSMENT
[FreeTextEntry1] : Mr. Arias is a 59-year-old male with a history of HTN, HLD, DM2, mild JOHNNIE on CPAP (RDI 10/hr), and secondary polycythemia who presents for evaluation of his uncontrolled JOHNNIE. He was diagnosed with JOHNNIE in 2015, AHI 2.8 but RDI 10/hr. CPAP titration in October 2015 revealed optimal setting of CPAP 8 cm H2O. MSLT showed hypersomnolence with MSOL of 5.5 min, 1 SOREMP. Unfortunately, he does not wear CPAP regularly because he does not like the mask. Of note, initial diagnostic PSG from June 2014 with T-90% of 47.9% and T-85% of <1%. Subsequent diagnostic PSG from September 2015 with T-90% of 13%, with no time less than 85%.\par \par Currently, he reports about 12-15 lbs wt loss over last few years (215 -> 198 lbs). Reports EDS and can can fall asleep very easily when reading a book, watching TV, and even driving. Usually wakes up unrefreshed. Never wakes up with a headache, but always wakes up with a dry mouth. Has been told that he snores. ESS is 24.\par \par 1. JOHNNIE with EDS:\par - Last CPAP titration from October 2015 with optimal setting of CPAP 8 cm H2O\par - Patient declining to use CPAP because the mask is uncomfortable and suffocating\par - He will go to sleep lab for mask fitting and inquire regarding nasal pillows\par - Patient counseled regarding adherence with therapy, especially given secondary polycythemia likely due to nocturnal hypoxemia\par - If EDS persists despite CPAP therapy, then may require modafinil therapy\par \par 2. Nocturnal hypoxemia with secondary polycythemia:\par - Exact etiology is unclear because nocturnal hypoxemia is not associated with sleep-disordered breathing events\par - Diagnostic PSG from September 2015 revealed no evidence of hypoventilation or hypercapnia (transcutaneous CO2 monitoring was normal at 41 mm Hg)\par - ABG today performed on room air with pH 7.40, pCO2 42, paO2 72, calculated HCO3 25, SaO2 95%\par - Has elevated A-a gradient of 25\par - Will consider obtaining a V/Q study and 2D-echo to rule out pulmonary hypertension or chronic thromboembolic disease given that he has a history of right renal infract and splenic infarctions in 2012 previously requiring warfarin\par - Previous hypecoagulable workup negative, including lupus anticoagulant , prothrombin gene mutation, Factor V Leiden , PNH and MGMTHFR\par - Hematology workup for his polycythemia, including bone marrow biopsy, has been negative with normocellular bone marrow with erythroid predominant lineage and iron stores absent. JAK2 testing in 2012 and 2013 negative, HGB was as high as 20 at that time\par - Interestingly, his last erythropoietin level from 2013 was normal at 12 (HGB at that time was 17). Will consider repeating EPO level next visit\par \par 3. Follow-up in 2-3 months after mask fitting to assess adherence with CPAP therapy

## 2019-07-10 NOTE — CONSULT LETTER
[Consult Letter:] : I had the pleasure of evaluating your patient, [unfilled]. [Dear  ___] : Dear  [unfilled], [Please see my note below.] : Please see my note below. [Consult Closing:] : Thank you very much for allowing me to participate in the care of this patient.  If you have any questions, please do not hesitate to contact me. [Sincerely,] : Sincerely, [FreeTextEntry2] : Dr. Mingo Rees MD\par [address] [place] [address]\[place] [address]\par Office: [phone]\par Fax: [phone] [FreeTextEntry3] : Johann Schulz MD\par Attending Physician in Pulmonary & Critical Care Medicine\par  of Medicine\par [employer] [facility]

## 2019-07-10 NOTE — HISTORY OF PRESENT ILLNESS
[FreeTextEntry1] : Mr. Arias is a 59-year-old male with a history of HTN, HLD, DM2, mild JOHNNIE on CPAP (RDI 10/hr), and secondary polycythemia who presents for evaluation of his uncontrolled JOHNNIE. He was diagnosed with JOHNNIE in 2015, AHI 2.8 but RDI 10/hr. He then had a CPAP titration in October 2015 revealing optimal setting of CPAP 8 cm H2O. MSLT showed hypersomnolence with MSOL of 5.5 min, 1 SOREMP. Unfortunately, he does not wear CPAP regularly. Of note, initial diagnostic PSG from June 2014 with T-90% of 47.9% and T-85% of <1%. Subsequent diagnostic PSG from September 2015 with T-90% of 13%, with no time less than 85%.\par \par He reports about 12-15 lbs weight loss over the the last few years (215 -> 198 lbs). Continues to experience excessive daytime somnolence. He can fall asleep very easily when reading a book, watching TV, and even driving. He goes to bed at 10-10:30pm, falls asleep within 5 minutes, wakes up about 2-3 times per night to use the bathroom, then goes back to bed and falls asleep right away. Wakes up about 5:30am. Usually wakes up unrefreshed. Never wakes up with a headache, but always wakes up with a dry mouth. Has been told that he snores. He reports wearing the CPAP only once per month. Reason for not wearing is because the mask is suffocating. He wears the full face mask. He currently denies any fevers, chills, chest pain, dyspnea, wheezing, or cough. [place] sleepiness score today is 24.\par \par He has a history of secondary polycythemia for several years, previously requiring phlebotomy. He also has a history of right renal infract and splenic infarctions in 2012 for which he was previously on warfarin. His hypecoagulable workup was negative, including lupus anticoagulant , prothrombin gene mutation, Factor V Leiden , PNH and MGMTHFR. A KASIA had previously shown an aortic arch/descending aorta atheroma. Hematology workup for his polycythemia, including bone marrow biopsy, has been negative with normocellular bone marrow with erythroid predominant lineage and iron stores were absent. JAK2 testing in 2012 and 2013 negative, HGB was as high as 20.

## 2019-07-10 NOTE — PHYSICAL EXAM
[General Appearance - Well Developed] : well developed [Normal Appearance] : normal appearance [Well Groomed] : well groomed [General Appearance - Well Nourished] : well nourished [General Appearance - In No Acute Distress] : no acute distress [Normal Conjunctiva] : the conjunctiva exhibited no abnormalities [Neck Appearance] : the appearance of the neck was normal [Neck Cervical Mass (___cm)] : no neck mass was observed [Heart Sounds] : normal S1 and S2 [Heart Rate And Rhythm] : heart rate and rhythm were normal [Arterial Pulses Normal] : the arterial pulses were normal [Murmurs] : no murmurs present [Edema] : no peripheral edema present [Respiration, Rhythm And Depth] : normal respiratory rhythm and effort [Exaggerated Use Of Accessory Muscles For Inspiration] : no accessory muscle use [Abdomen Soft] : soft [Auscultation Breath Sounds / Voice Sounds] : lungs were clear to auscultation bilaterally [Abdomen Tenderness] : non-tender [Abnormal Walk] : normal gait [Nail Clubbing] : no clubbing of the fingernails [Cyanosis, Localized] : no localized cyanosis [Skin Color & Pigmentation] : normal skin color and pigmentation [Skin Turgor] : normal skin turgor [Motor Exam] : the motor exam was normal [Cranial Nerves] : cranial nerves 2-12 were intact [] : no rash [Oriented To Time, Place, And Person] : oriented to person, place, and time [Mood] : the mood was normal [Affect] : the affect was normal [FreeTextEntry1] : crowded

## 2019-07-10 NOTE — REVIEW OF SYSTEMS
[Snoring] : snoring [Nonrestorative Sleep] : nonrestorative sleep [Awakes With Dry Mouth] : awakes with dry mouth [Negative] : Pulmonary Hypertension [Awakes With Headache] : awakes without a headache

## 2019-07-16 ENCOUNTER — APPOINTMENT (OUTPATIENT)
Dept: PULMONOLOGY | Facility: CLINIC | Age: 59
End: 2019-07-16

## 2019-09-12 ENCOUNTER — OUTPATIENT (OUTPATIENT)
Dept: OUTPATIENT SERVICES | Facility: HOSPITAL | Age: 59
LOS: 1 days | Discharge: HOME | End: 2019-09-12

## 2019-09-12 DIAGNOSIS — D45 POLYCYTHEMIA VERA: ICD-10-CM

## 2019-09-16 ENCOUNTER — RESULT REVIEW (OUTPATIENT)
Age: 59
End: 2019-09-16

## 2019-09-16 ENCOUNTER — APPOINTMENT (OUTPATIENT)
Dept: HEMATOLOGY ONCOLOGY | Facility: CLINIC | Age: 59
End: 2019-09-16

## 2019-09-16 LAB
HCT VFR BLD CALC: 50.1 % — HIGH (ref 39–50)
HGB BLD-MCNC: 17.5 G/DL — HIGH (ref 13–17)
MCHC RBC-ENTMCNC: 30.6 PG — SIGNIFICANT CHANGE UP (ref 27–34)
MCHC RBC-ENTMCNC: 35 G/DL — SIGNIFICANT CHANGE UP (ref 32–36)
MCV RBC AUTO: 87.5 FL — SIGNIFICANT CHANGE UP (ref 80–100)
PLATELET # BLD AUTO: 307 K/UL — SIGNIFICANT CHANGE UP (ref 150–400)
RBC # BLD: 5.72 M/UL — SIGNIFICANT CHANGE UP (ref 4.2–5.8)
RBC # FLD: 13.9 % — SIGNIFICANT CHANGE UP (ref 10.3–14.5)
WBC # BLD: 7.6 K/UL — SIGNIFICANT CHANGE UP (ref 3.8–10.5)
WBC # FLD AUTO: 7.6 K/UL — SIGNIFICANT CHANGE UP (ref 3.8–10.5)

## 2019-10-14 ENCOUNTER — APPOINTMENT (OUTPATIENT)
Dept: HEMATOLOGY ONCOLOGY | Facility: CLINIC | Age: 59
End: 2019-10-14

## 2019-10-16 ENCOUNTER — APPOINTMENT (OUTPATIENT)
Dept: PULMONOLOGY | Facility: CLINIC | Age: 59
End: 2019-10-16
Payer: MEDICAID

## 2019-10-16 VITALS
HEIGHT: 67 IN | SYSTOLIC BLOOD PRESSURE: 125 MMHG | DIASTOLIC BLOOD PRESSURE: 81 MMHG | BODY MASS INDEX: 31.08 KG/M2 | TEMPERATURE: 97.7 F | OXYGEN SATURATION: 95 % | RESPIRATION RATE: 18 BRPM | HEART RATE: 70 BPM | WEIGHT: 198 LBS

## 2019-10-16 PROCEDURE — 99213 OFFICE O/P EST LOW 20 MIN: CPT

## 2019-10-16 NOTE — ASSESSMENT
[FreeTextEntry1] : Mr. Arias is a 59-year-old male with a history of HTN, HLD, DM2, mild JOHNNIE on CPAP (RDI 10/hr), and secondary polycythemia due to chronic nocturnal hypoxemia who presents for follow-up of his JOHNNIE. He was diagnosed with JOHNNIE in 2015, AHI 2.8 but RDI 10/hr. CPAP titration in October 2015 revealed optimal setting of CPAP 8 cm H2O. MSLT showed hypersomnolence with MSOL of 5.5 min, 1 SOREMP. Unfortunately, he does not wear CPAP regularly because he does not like the mask and excessive dry mouth. Of note, initial diagnostic PSG from June 2014 with T-90% of 47.9% and T-85% of <1%. Subsequent diagnostic PSG from September 2015 with T-90% of 13%, with no time less than 85%.\par \par Currently, he reports about 12-15 lbs wt loss over last few years (215 -> 198 lbs). Reports EDS and can can fall asleep very easily when reading a book, watching TV, and even driving. Usually wakes up unrefreshed. Never wakes up with a headache, but always wakes up with a dry mouth. Has been told that he snores. ESS is 24.\par \par 1. JOHNNIE with EDS:\par - Still pending CPAP titration\par - Reached out to sleep lab to expedite\par - Also needs mask fitting (would consider nasal pillows given claustrophobia)\par - Will need humidification given dry mouth with therapy\par - Patient counseled regarding adherence with therapy, especially given secondary polycythemia likely due to chronic nocturnal hypoxemia\par - If EDS persists despite CPAP therapy, then may require modafinil therapy\par \par 2. Nocturnal hypoxemia with secondary polycythemia:\par - Exact etiology is unclear because nocturnal hypoxemia is not entirely associated with sleep-disordered breathing events\par - Diagnostic PSG from September 2015 revealed no evidence of hypoventilation or hypercapnia (transcutaneous CO2 monitoring was normal at 41 mm Hg)\par - ABG on room air from July 2019 with pH 7.40, pCO2 42, paO2 72, calculated HCO3 25, SaO2 95%\par - Has elevated A-a gradient of 25\par - Needs V/Q scan and 2D-echo to rule out pulmonary hypertension or chronic thromboembolic disease given that he has a history of right renal infract and splenic infarctions in 2012 previously requiring warfarin\par - Previous hypercoagulable workup negative, including lupus anticoagulant , prothrombin gene mutation, Factor V Leiden , PNH and MGMTHFR\par - Hematology workup for his polycythemia, including bone marrow biopsy, has been negative with normocellular bone marrow with erythroid predominant lineage and iron stores absent. JAK2 testing in 2012 and 2013 negative, HGB was as high as 20 at that time\par - Check erythropoietin level. Interestingly, his last erythropoietin level from 2013 was normal at 12 (HGB at that time was 17)\par - If remains unable to tolerate CPAP therapy, then consider initiating nocturnal oxygen via NC to treat his chronic hypoxemia\par \par 3. Follow-up after CPAP titration and mask fitting completed

## 2019-10-16 NOTE — HISTORY OF PRESENT ILLNESS
[FreeTextEntry1] : Mr. Arias is a 59-year-old male with a history of HTN, HLD, DM2, mild JOHNNIE on CPAP (RDI 10/hr), and secondary polycythemia who presents for follow-up of his uncontrolled JOHNNIE. \par \par Reports that he tried to schedule a CPAP titration study, but was unable to make an appointment. Will re-try. He reports that he tried to use his CPAP, but he was only able to tolerate for 2-3 hours due to dry mouth and the full face mask was too uncomfortable. Still reports excessive daytime somnolence. Can fall asleep very easily reading a book, watching TV, and even driving. Continues to experience excessive daytime somnolence. Usually wakes up unrefreshed. Never wakes up with a headache, but always wakes up with a dry mouth. Has been told that he snores. He refuses to wear CPAP because the mask is suffocating and he develops severe dry mouth. He wears the full face mask. [place] sleepiness score remains at 24.\par \par He was diagnosed with JOHNNIE in 2015, AHI 2.8 but RDI 10/hr. He then had a CPAP titration in October 2015 revealing optimal setting of CPAP 8 cm H2O. MSLT showed hypersomnolence with MSOL of 5.5 min, 1 SOREMP. Unfortunately, he does not wear CPAP regularly. Of note, initial diagnostic PSG from June 2014 with T-90% of 47.9% and T-85% of <1%. Subsequent diagnostic PSG from September 2015 with T-90% of 13%, with no time less than 85%.\par \par He has a history of secondary polycythemia for several years, previously requiring phlebotomy. He also has a history of right renal infract and splenic infarctions in 2012 for which he was previously on warfarin. His hypecoagulable workup was negative, including lupus anticoagulant , prothrombin gene mutation, Factor V Leiden , PNH and MGMTHFR. A KASIA had previously shown an aortic arch/descending aorta atheroma. Hematology workup for his polycythemia, including bone marrow biopsy, has been negative with normocellular bone marrow with erythroid predominant lineage and iron stores were absent. JAK2 testing in 2012 and 2013 negative, HGB was as high as 20.

## 2019-10-28 ENCOUNTER — APPOINTMENT (OUTPATIENT)
Dept: CV DIAGNOSITCS | Facility: HOSPITAL | Age: 59
End: 2019-10-28

## 2019-10-28 ENCOUNTER — OUTPATIENT (OUTPATIENT)
Dept: OUTPATIENT SERVICES | Facility: HOSPITAL | Age: 59
LOS: 1 days | End: 2019-10-28
Payer: MEDICAID

## 2019-10-28 DIAGNOSIS — I25.10 ATHEROSCLEROTIC HEART DISEASE OF NATIVE CORONARY ARTERY WITHOUT ANGINA PECTORIS: ICD-10-CM

## 2019-10-28 PROCEDURE — 93306 TTE W/DOPPLER COMPLETE: CPT | Mod: 26

## 2019-10-28 PROCEDURE — 93306 TTE W/DOPPLER COMPLETE: CPT

## 2019-10-31 LAB — EPO SERPL-MCNC: 6.3 MIU/ML

## 2019-12-09 ENCOUNTER — OUTPATIENT (OUTPATIENT)
Dept: OUTPATIENT SERVICES | Facility: HOSPITAL | Age: 59
LOS: 1 days | Discharge: HOME | End: 2019-12-09

## 2019-12-09 DIAGNOSIS — D45 POLYCYTHEMIA VERA: ICD-10-CM

## 2019-12-12 ENCOUNTER — APPOINTMENT (OUTPATIENT)
Dept: SLEEP CENTER | Facility: CLINIC | Age: 59
End: 2019-12-12

## 2019-12-13 ENCOUNTER — APPOINTMENT (OUTPATIENT)
Dept: HEMATOLOGY ONCOLOGY | Facility: CLINIC | Age: 59
End: 2019-12-13

## 2019-12-16 ENCOUNTER — RX RENEWAL (OUTPATIENT)
Age: 59
End: 2019-12-16

## 2020-06-01 ENCOUNTER — EMERGENCY (EMERGENCY)
Facility: HOSPITAL | Age: 60
LOS: 1 days | Discharge: HOME | End: 2020-06-01
Attending: STUDENT IN AN ORGANIZED HEALTH CARE EDUCATION/TRAINING PROGRAM | Admitting: STUDENT IN AN ORGANIZED HEALTH CARE EDUCATION/TRAINING PROGRAM
Payer: MEDICAID

## 2020-06-01 VITALS
RESPIRATION RATE: 16 BRPM | DIASTOLIC BLOOD PRESSURE: 91 MMHG | SYSTOLIC BLOOD PRESSURE: 142 MMHG | TEMPERATURE: 98 F | HEART RATE: 71 BPM | OXYGEN SATURATION: 100 %

## 2020-06-01 VITALS
OXYGEN SATURATION: 95 % | RESPIRATION RATE: 16 BRPM | SYSTOLIC BLOOD PRESSURE: 141 MMHG | HEART RATE: 68 BPM | DIASTOLIC BLOOD PRESSURE: 87 MMHG

## 2020-06-01 PROCEDURE — 12002 RPR S/N/AX/GEN/TRNK2.6-7.5CM: CPT

## 2020-06-01 PROCEDURE — 99282 EMERGENCY DEPT VISIT SF MDM: CPT | Mod: 25

## 2020-06-01 RX ORDER — TETANUS TOXOID, REDUCED DIPHTHERIA TOXOID AND ACELLULAR PERTUSSIS VACCINE, ADSORBED 5; 2.5; 8; 8; 2.5 [IU]/.5ML; [IU]/.5ML; UG/.5ML; UG/.5ML; UG/.5ML
0.5 SUSPENSION INTRAMUSCULAR ONCE
Refills: 0 | Status: COMPLETED | OUTPATIENT
Start: 2020-06-01 | End: 2020-06-01

## 2020-06-01 NOTE — ED PROVIDER NOTE - PATIENT PORTAL LINK FT
You can access the FollowMyHealth Patient Portal offered by [facility] by registering at the following website: [email or web address]. By joining [employer]’s FollowMyHealth portal, you will also be able to view your health information using other applications (apps) compatible with our system.

## 2020-06-01 NOTE — ED ADULT TRIAGE NOTE - CHIEF COMPLAINT QUOTE
Pt comes in for c/o right middle finger laceration from an electric saw. Pt has about a 1 inch laceration to medial tip of finger. Pt appears in no acute distress, vs as noted and bleeding is minimal at this time. Redressed area in triage and ice pack given.

## 2020-06-01 NOTE — ED ADULT TRIAGE NOTE - NS ED TRIAGE AVPU SCALE
Alert-The patient is alert, awake and responds to voice. The patient is oriented to time, place, and person. The triage nurse is able to obtain subjective information. Inpatient

## 2020-06-01 NOTE — ED ADULT NURSE NOTE - OBJECTIVE STATEMENT
pt reports working with a knife at home when he accidentally split, cut digit 3 on right hand, bleeding controlled, cleaned and sutured by PA, wound covered with triple antibiotic ointment and bandaid, pt otherwise NAD, breathing even and unlabored, alert and oriented. Will continue to monitor.

## 2020-06-01 NOTE — ED PROVIDER NOTE - ATTENDING CONTRIBUTION TO CARE
61 yo M dm, htn with superficial laceration from table to right 3rd digit. no evidence of tendon injury. FROM and strength on exam. sensation cap refill intact. plan: repair tetanus.

## 2020-06-01 NOTE — ED PROVIDER NOTE - SKIN, MLM
4 cm lac to palmar surface of distal R 3rd digit. Bleeding controlled. No tendon involvement. No foreign body. Explored wound completely in bloodless field.

## 2020-06-01 NOTE — ED PROVIDER NOTE - OBJECTIVE STATEMENT
59 y/o M p/w R 3rd digit lac. Pt states he was cutting wood with table saw and sliced finger against saw. Bleeding controlled with direct pressure. Pt able to fully move finger. No other injuries or complaints. Pt states he had tetanus vaccine 3 years ago with PCP.

## 2020-06-01 NOTE — ED PROVIDER NOTE - NSFOLLOWUPINSTRUCTIONS_ED_ALL_ED_FT
Follow up with your primary care physician in 48-72 hours- bring copies of your results.  Return to the ER for worsening or persistent symptoms, including but not limited to worsening/persistent pain, shortness of breath, fevers, vomiting, lightheadedness, passing out and/or ANY NEW OR CONCERNING SYMPTOMS. If you have issues obtaining follow up, please call: [phone] to obtain a doctor or specialist who takes your insurance in your area.  You may call [phone] to make an appointment with the internal medicine clinic.    Follow up in 7 days for suture removal   Keep sutures Clean, Dry, Intact

## 2020-06-03 ENCOUNTER — OUTPATIENT (OUTPATIENT)
Dept: OUTPATIENT SERVICES | Facility: HOSPITAL | Age: 60
LOS: 1 days | End: 2020-06-03

## 2020-06-03 ENCOUNTER — LABORATORY RESULT (OUTPATIENT)
Age: 60
End: 2020-06-03

## 2020-06-03 ENCOUNTER — APPOINTMENT (OUTPATIENT)
Dept: INTERNAL MEDICINE | Facility: CLINIC | Age: 60
End: 2020-06-03
Payer: MEDICAID

## 2020-06-03 ENCOUNTER — RESULT CHARGE (OUTPATIENT)
Age: 60
End: 2020-06-03

## 2020-06-03 VITALS
DIASTOLIC BLOOD PRESSURE: 80 MMHG | WEIGHT: 200 LBS | HEART RATE: 78 BPM | SYSTOLIC BLOOD PRESSURE: 110 MMHG | HEIGHT: 67 IN | OXYGEN SATURATION: 97 % | BODY MASS INDEX: 31.39 KG/M2

## 2020-06-03 DIAGNOSIS — I10 ESSENTIAL (PRIMARY) HYPERTENSION: ICD-10-CM

## 2020-06-03 DIAGNOSIS — I82.409 ACUTE EMBOLISM AND THROMBOSIS OF UNSPECIFIED DEEP VEINS OF UNSPECIFIED LOWER EXTREMITY: ICD-10-CM

## 2020-06-03 DIAGNOSIS — Z87.898 PERSONAL HISTORY OF OTHER SPECIFIED CONDITIONS: ICD-10-CM

## 2020-06-03 DIAGNOSIS — Z01.00 ENCOUNTER FOR EXAMINATION OF EYES AND VISION W/OUT ABNORMAL FINDINGS: ICD-10-CM

## 2020-06-03 DIAGNOSIS — Z87.19 PERSONAL HISTORY OF OTHER DISEASES OF THE DIGESTIVE SYSTEM: ICD-10-CM

## 2020-06-03 DIAGNOSIS — H91.90 UNSPECIFIED HEARING LOSS, UNSPECIFIED EAR: ICD-10-CM

## 2020-06-03 DIAGNOSIS — K62.5 HEMORRHAGE OF ANUS AND RECTUM: ICD-10-CM

## 2020-06-03 DIAGNOSIS — Z00.00 ENCOUNTER FOR GENERAL ADULT MEDICAL EXAMINATION WITHOUT ABNORMAL FINDINGS: ICD-10-CM

## 2020-06-03 DIAGNOSIS — H91.93 UNSPECIFIED HEARING LOSS, BILATERAL: ICD-10-CM

## 2020-06-03 DIAGNOSIS — E11.9 TYPE 2 DIABETES MELLITUS WITHOUT COMPLICATIONS: ICD-10-CM

## 2020-06-03 DIAGNOSIS — D75.1 SECONDARY POLYCYTHEMIA: ICD-10-CM

## 2020-06-03 DIAGNOSIS — K29.60 OTHER GASTRITIS W/OUT BLEEDING: ICD-10-CM

## 2020-06-03 DIAGNOSIS — G47.33 OBSTRUCTIVE SLEEP APNEA (ADULT) (PEDIATRIC): ICD-10-CM

## 2020-06-03 DIAGNOSIS — E78.5 HYPERLIPIDEMIA, UNSPECIFIED: ICD-10-CM

## 2020-06-03 LAB
ALBUMIN SERPL ELPH-MCNC: 4.9 G/DL — SIGNIFICANT CHANGE UP (ref 3.3–5)
ALP SERPL-CCNC: 61 U/L — SIGNIFICANT CHANGE UP (ref 40–120)
ALT FLD-CCNC: 26 U/L — SIGNIFICANT CHANGE UP (ref 4–41)
ANION GAP SERPL CALC-SCNC: 13 MMO/L — SIGNIFICANT CHANGE UP (ref 7–14)
AST SERPL-CCNC: 18 U/L — SIGNIFICANT CHANGE UP (ref 4–40)
BASOPHILS # BLD AUTO: 0.05 K/UL — SIGNIFICANT CHANGE UP (ref 0–0.2)
BASOPHILS NFR BLD AUTO: 0.8 % — SIGNIFICANT CHANGE UP (ref 0–2)
BILIRUB SERPL-MCNC: 0.9 MG/DL — SIGNIFICANT CHANGE UP (ref 0.2–1.2)
BUN SERPL-MCNC: 11 MG/DL — SIGNIFICANT CHANGE UP (ref 7–23)
CALCIUM SERPL-MCNC: 10.8 MG/DL — HIGH (ref 8.4–10.5)
CHLORIDE SERPL-SCNC: 100 MMOL/L — SIGNIFICANT CHANGE UP (ref 98–107)
CHOLEST SERPL-MCNC: 163 MG/DL — SIGNIFICANT CHANGE UP (ref 120–199)
CO2 SERPL-SCNC: 27 MMOL/L — SIGNIFICANT CHANGE UP (ref 22–31)
CREAT SERPL-MCNC: 1.09 MG/DL — SIGNIFICANT CHANGE UP (ref 0.5–1.3)
EOSINOPHIL # BLD AUTO: 0.16 K/UL — SIGNIFICANT CHANGE UP (ref 0–0.5)
EOSINOPHIL NFR BLD AUTO: 2.5 % — SIGNIFICANT CHANGE UP (ref 0–6)
GLUCOSE BLDC GLUCOMTR-MCNC: 115
GLUCOSE SERPL-MCNC: 97 MG/DL — SIGNIFICANT CHANGE UP (ref 70–99)
HBA1C BLD-MCNC: 6.6 % — HIGH (ref 4–5.6)
HCT VFR BLD CALC: 46.8 % — SIGNIFICANT CHANGE UP (ref 39–50)
HDLC SERPL-MCNC: 52 MG/DL — SIGNIFICANT CHANGE UP (ref 35–55)
HGB BLD-MCNC: 16.8 G/DL — SIGNIFICANT CHANGE UP (ref 13–17)
IMM GRANULOCYTES NFR BLD AUTO: 0.2 % — SIGNIFICANT CHANGE UP (ref 0–1.5)
LIPID PNL WITH DIRECT LDL SERPL: 103 MG/DL — SIGNIFICANT CHANGE UP
LYMPHOCYTES # BLD AUTO: 3.16 K/UL — SIGNIFICANT CHANGE UP (ref 1–3.3)
LYMPHOCYTES # BLD AUTO: 48.6 % — HIGH (ref 13–44)
MCHC RBC-ENTMCNC: 29.6 PG — SIGNIFICANT CHANGE UP (ref 27–34)
MCHC RBC-ENTMCNC: 35.9 % — SIGNIFICANT CHANGE UP (ref 32–36)
MCV RBC AUTO: 82.5 FL — SIGNIFICANT CHANGE UP (ref 80–100)
MONOCYTES # BLD AUTO: 0.6 K/UL — SIGNIFICANT CHANGE UP (ref 0–0.9)
MONOCYTES NFR BLD AUTO: 9.2 % — SIGNIFICANT CHANGE UP (ref 2–14)
NEUTROPHILS # BLD AUTO: 2.52 K/UL — SIGNIFICANT CHANGE UP (ref 1.8–7.4)
NEUTROPHILS NFR BLD AUTO: 38.7 % — LOW (ref 43–77)
NRBC # FLD: 0 K/UL — SIGNIFICANT CHANGE UP (ref 0–0)
PLATELET # BLD AUTO: 315 K/UL — SIGNIFICANT CHANGE UP (ref 150–400)
PMV BLD: 9.5 FL — SIGNIFICANT CHANGE UP (ref 7–13)
POTASSIUM SERPL-MCNC: 5 MMOL/L — SIGNIFICANT CHANGE UP (ref 3.5–5.3)
POTASSIUM SERPL-SCNC: 5 MMOL/L — SIGNIFICANT CHANGE UP (ref 3.5–5.3)
PROT SERPL-MCNC: 7.9 G/DL — SIGNIFICANT CHANGE UP (ref 6–8.3)
RBC # BLD: 5.67 M/UL — SIGNIFICANT CHANGE UP (ref 4.2–5.8)
RBC # FLD: 13.7 % — SIGNIFICANT CHANGE UP (ref 10.3–14.5)
SODIUM SERPL-SCNC: 140 MMOL/L — SIGNIFICANT CHANGE UP (ref 135–145)
T4 FREE SERPL-MCNC: 1.1 NG/DL — SIGNIFICANT CHANGE UP (ref 0.9–1.8)
TRIGL SERPL-MCNC: 75 MG/DL — SIGNIFICANT CHANGE UP (ref 10–149)
TSH SERPL-MCNC: 2.62 UIU/ML — SIGNIFICANT CHANGE UP (ref 0.27–4.2)
WBC # BLD: 6.5 K/UL — SIGNIFICANT CHANGE UP (ref 3.8–10.5)
WBC # FLD AUTO: 6.5 K/UL — SIGNIFICANT CHANGE UP (ref 3.8–10.5)

## 2020-06-03 PROCEDURE — 99214 OFFICE O/P EST MOD 30 MIN: CPT | Mod: GC

## 2020-06-04 LAB
CREAT UR-MCNC: 131 MG/DL — SIGNIFICANT CHANGE UP
MICROALBUMIN UR-MCNC: < 1.2 MG/DL — SIGNIFICANT CHANGE UP

## 2020-06-08 ENCOUNTER — EMERGENCY (EMERGENCY)
Facility: HOSPITAL | Age: 60
LOS: 1 days | Discharge: HOME | End: 2020-06-08
Attending: EMERGENCY MEDICINE | Admitting: EMERGENCY MEDICINE
Payer: MEDICAID

## 2020-06-08 VITALS
DIASTOLIC BLOOD PRESSURE: 84 MMHG | SYSTOLIC BLOOD PRESSURE: 157 MMHG | TEMPERATURE: 98 F | RESPIRATION RATE: 18 BRPM | HEART RATE: 89 BPM

## 2020-06-08 PROCEDURE — L9995: CPT

## 2020-06-08 NOTE — ED PROVIDER NOTE - PHYSICAL EXAMINATION
PHYSICAL EXAM:  GENERAL: non-toxic appearing; in no respiratory distress  CHEST/LUNG: CTAB no wheezes/rhonchi/rales  HEART: RRR no murmur/gallops/rubs  ABDOMEN: +BS, soft, NT, ND  SKIN:  on palmar aspect of RIGHT 3rd (middle) digit, there is a well-healed, linear laceration (no wound dehiscence) with THREE sutures in place (pt states one fell out 2 days ago); no signs of infection; able to fully range the DIP

## 2020-06-08 NOTE — ED PROVIDER NOTE - NS ED ROS FT
Constitutional: no fevers or chills  CV: no cp or palpitations  Resp: no sob or cough  GI: no abd pain, no nausea, no vomiting, no diarrhea, no constipation  MSK: no myalgais or arthralgias  skin: no rashes  ROS statement: all other ROS negative except as per HPI

## 2020-06-08 NOTE — ED PROVIDER NOTE - NSFOLLOWUPINSTRUCTIONS_ED_ALL_ED_FT
You had your sutures removed from your finger.    If there are any signs of infection, including but not limited to severe pain, bleeding, and/or purulent discharge, please return to the emergency department.

## 2020-06-08 NOTE — ED PROVIDER NOTE - CLINICAL SUMMARY MEDICAL DECISION MAKING FREE TEXT BOX
Alhaji Merino MD. pt presents for suture removal placed 7 day sago here after pt cut himself on table saw while cutting wood. pt had 4 sutures placed on palmar aspect of RIGHT 3rd (MIDDLE) digit; states one suture fell out 2 days ago. No signs of infection. will remove sutures and d/c patient

## 2020-06-08 NOTE — ED PROCEDURE NOTE - ATTENDING CONTRIBUTION TO CARE
Dr. Morales: I personally supervised this procedure performed by the resident and I agree with the above documentation.

## 2020-06-08 NOTE — ED PROVIDER NOTE - ATTENDING CONTRIBUTION TO CARE
Attending note:   After face to face evaluation of this patient, I concur with above noted hx, pe, and care plan for this patient.  Morales: 60 yom with DM, HTN, here for suture removal. Right hand 3rd finger lateral side 2cm well healing laceration, no tenderness, no edema, FROM. Sutures removed.

## 2020-06-08 NOTE — ED PROVIDER NOTE - PATIENT PORTAL LINK FT
You can access the FollowMyHealth Patient Portal offered by [facility] by registering at the following website: [email or web address]. By joining [employer]’s FollowMyHealth portal, you will also be able to view your health information using other applications (apps) compatible with our system.

## 2020-06-08 NOTE — ED PROVIDER NOTE - OBJECTIVE STATEMENT
60 M PMHX DM, HTN, presnts for suture removal from RIGHT 3rd digit (middle). Pt cut himself accidentally on table saw when cutting wood last week. had FOUR sutures placed last week here. Denies fever, pain, purulent or bloody discharge. Pt states that one suture fell out 2 days ago.

## 2020-06-09 NOTE — DISCUSSION/SUMMARY
[ED] : a call from ED [FreeTextEntry1] : 60 M PMHX DM, HTN\par ED visit 6/8/2002 for suture removal from RIGHT 3rd digit (middle). \par Pt cut himself accidentally on table saw when cutting wood last week. had FOUR sutures placed last week  Denies fever, pain, purulent or bloody discharge. Pt states he is otherwise feeling well with no other concerns at this time. \par \par [facility]

## 2020-06-12 ENCOUNTER — APPOINTMENT (OUTPATIENT)
Dept: HEMATOLOGY ONCOLOGY | Facility: CLINIC | Age: 60
End: 2020-06-12
Payer: MEDICAID

## 2020-06-15 ENCOUNTER — OUTPATIENT (OUTPATIENT)
Dept: OUTPATIENT SERVICES | Facility: HOSPITAL | Age: 60
LOS: 1 days | Discharge: HOME | End: 2020-06-15

## 2020-06-15 DIAGNOSIS — D45 POLYCYTHEMIA VERA: ICD-10-CM

## 2020-06-19 ENCOUNTER — APPOINTMENT (OUTPATIENT)
Dept: HEMATOLOGY ONCOLOGY | Facility: CLINIC | Age: 60
End: 2020-06-19
Payer: MEDICAID

## 2020-06-19 DIAGNOSIS — D72.820 LYMPHOCYTOSIS (SYMPTOMATIC): ICD-10-CM

## 2020-06-19 PROCEDURE — 99442: CPT

## 2020-06-19 NOTE — HISTORY OF PRESENT ILLNESS
[0 - No Distress] : Distress Level: 0 [Home] : at home, [unfilled] , at the time of the visit. [Medical Office: ([facility])___] : at the medical office located in  [Verbal consent obtained from patient] : the patient, [unfilled] [de-identified] : 61 yo M with polycythemia, JAK2 negative, unclear if primary polycythemia vera or secondary polycythemia, initially diagnosed because of a renal vein thrombosis, requiring coumadin, and incidentally found to have polycythemia. Coumadin was stopped after he developed hemarthrosis, however he had already finished 1.5 years of Coumadin. Currently he is only on ASA 81 mg, which he is not compliant with. When I first met him in 2014, we started off with phlebotomy every 2 weeks and then tapered as we got him to goal HCT below 45, however now it is likely that this is secondary polycythemia from JOHNNIE. He was lost to follow up for a few months, but has now been more compliant. He went for a sleep study and was diagnosed with JOHNNIE and is now on CPAP at night. He wears it on and off, sometimes he is not able to tolerate it. \par  [de-identified] : Patient  feels well. no dizziness, no chest pain, no sob, no headaches, no blurry vision. he has NOT been wearing his CPAP machine. \par \par Labs 6/3/2020- WBC 6.5, Hb 16.8 g/dl, Hct 46.8 %, MCV 82.5 , PLT, 315,  neutrophils mildly decrease 38.7%, lymphocytes 48.6%, monocytes 9.2%, eos 2.5%, basos 0.8%. \par \par No other changes in medical, surgical or social history since 6/10/2018.\par

## 2020-06-19 NOTE — ASSESSMENT
[FreeTextEntry1] : 61 yo M with PMHx of erythrocytosis. Patient is not able to tolerate the CPAP secondary to claustrophobia.\par \par Polycythemia- JAK2 is negative. possible etiology of secondary polycythemia could be JOHNNIE?\par \par Bone marrow biopsy normal in April 2014. \par Continue ASA 81 mg daily. \par \par Labs 6/3/2020- WBC 6.5, Hb 16.8 g/dl, Hct 46.8 %, MCV 82.5 , PLT, 315,  neutrophils mildly decrease 38.7%, lymphocytes 48.6%, monocytes 9.2%, eos 2.5%, basos 0.8%. \par Will check flow cytometry to evaluate lymphocytosis.\par \par This service was provided by using telehealth. The patient was at home and I was at [facility]. The patient requested and participated in this encounter. The encounter last 30   minutes coordinating his/her care and counseling.\par \par \par RTC 4 months.

## 2020-07-20 NOTE — HISTORY OF PRESENT ILLNESS
[de-identified] : 58 yo M with polycythemia, right renal infract and splenic infarctions ( diagnosed in 2012) was on coumadin, JOHNNIE  ( not compliant with CPAP) upper GI bleed, HTN, MI, DM presents today as a follow up visit.\par \par I inherited the patient's care in October 2018. His hematologist prior to me was Dr. Link Gaming as well as Dr. Bethany Coronado. His history includes a diagnosis of right renal infarcts and splenic infarcts ( diagnosed in 2012) and he was on Coumadin. With regards to a hypecoagulable workup as to elucidate the underlying cause of the infarcts, he was tested negative for lupus anticoagulant , prothrombin gene mutation, Factor V Leiden , PNH and MGMTHFR. He also underwent a KASIA which showed a minimal atheroma in aortic arch/descending aorta in 2012. He stopped coumadin years ago. He was referred to hematology for polycythemia. In 2012, his hemoglobin/hct were 20.8/ 58. In 2015, his hgb/hct were running as high as 19.4/ 55 . He had a bone marrow biopsy done in 4-2014 and it showed normocellular bone marrow with erythroid predominant lineage and iron stores were absent. His JAK2 tested in 2012 and 2013 were negative. He had a normal karyotype and BCR-ABL ( checked on bone marrow) was negative. He was diagnosed with JOHNNIE in 2014 and in reviewing his records, it looks like he has not been compliant with CPAP.  He had been phlebotimized by prior hematologists, last phlebotomy was in Nov 2017 when his hgb/hct were 17.5/49.2 and he was symptomatic with chest pain. That phlebotomy was ordered by Dr. Link Gaming.  [de-identified] : Mr. Arias presents for follow up. . He feels well. no dizziness, no chest pain, no sob, no headaches, no blurry vision. he has NOT been wearing his CPAP machine. \par \par cell : [phone] Burow's Graft Text: The defect edges were debeveled with a #15 scalpel blade.  Given the location of the defect, shape of the defect, the proximity to free margins and the presence of a standing cone deformity a Burow's skin graft was deemed most appropriate. The standing cone was removed and this tissue was then trimmed to the shape of the primary defect. The adipose tissue was also removed until only dermis and epidermis were left.  The skin margins of the secondary defect were undermined to an appropriate distance in all directions utilizing iris scissors.  The secondary defect was closed with interrupted buried subcutaneous sutures.  The skin edges were then re-apposed with running  sutures.  The skin graft was then placed in the primary defect and oriented appropriately.

## 2020-08-23 NOTE — ASSESSMENT
[FreeTextEntry1] : 60-year-old male with a history of HTN, HLD, DM2, mild JOHNNIE on CPAP (RDI 10/hr),  splenic and renal infarcts (was on coumadin, but stopped 2/2 hemarthrosis) and secondary polycythemia who presents for annual physical. \par \par HM: \par A1c: performed today\par Colon: GI referral today\par \par See above for assessment and plan.\par \par Case d/w Dr. Jules \par \par RTC in 1 year.\par \par Michael Knutson MD PGY2\par [facility]\par

## 2020-08-23 NOTE — HISTORY OF PRESENT ILLNESS
[de-identified] : 60-year-old male with a history of HTN, HLD, DM2, mild JOHNNIE on CPAP (RDI 10/hr),  splenic and renal infarcts (was on coumadin, but stopped 2/2 hemarthrosis) and secondary polycythemia who presents for annual physical.\par \par Pt reported that he does not check his blood sugar on a regular basis because he is scared of needles. Pt reported no symptoms of hypo/hyperglycemia. \par \par Pt reported no fever, chills, nausea/vomiting/dizziness, chest pain, palpitations, shortness of breath, abdominal pain, loose stools, constipation, abnormal bleeding, or dysuria.\par

## 2020-08-23 NOTE — PHYSICAL EXAM
[Normal] : affect was normal and insight and judgment were intact [Comprehensive Foot Exam Normal] : Right and left foot were examined and both feet are normal. No ulcers in either foot. Toes are normal and with full ROM.  Normal tactile sensation with monofilament testing throughout both feet [de-identified] : CN VIII deficits b/l

## 2020-10-12 ENCOUNTER — OUTPATIENT (OUTPATIENT)
Dept: OUTPATIENT SERVICES | Facility: HOSPITAL | Age: 60
LOS: 1 days | Discharge: HOME | End: 2020-10-12

## 2020-10-12 DIAGNOSIS — D45 POLYCYTHEMIA VERA: ICD-10-CM

## 2020-10-15 ENCOUNTER — RESULT REVIEW (OUTPATIENT)
Age: 60
End: 2020-10-15

## 2020-10-15 ENCOUNTER — APPOINTMENT (OUTPATIENT)
Dept: HEMATOLOGY ONCOLOGY | Facility: CLINIC | Age: 60
End: 2020-10-15
Payer: MEDICAID

## 2020-10-15 VITALS
HEIGHT: 66.97 IN | BODY MASS INDEX: 31.97 KG/M2 | DIASTOLIC BLOOD PRESSURE: 74 MMHG | SYSTOLIC BLOOD PRESSURE: 116 MMHG | TEMPERATURE: 98.1 F | HEART RATE: 72 BPM | RESPIRATION RATE: 15 BRPM | WEIGHT: 203.69 LBS | OXYGEN SATURATION: 96 %

## 2020-10-15 LAB
BASOPHILS # BLD AUTO: 0.05 K/UL — SIGNIFICANT CHANGE UP (ref 0–0.2)
BASOPHILS NFR BLD AUTO: 0.8 % — SIGNIFICANT CHANGE UP (ref 0–2)
EOSINOPHIL # BLD AUTO: 0.18 K/UL — SIGNIFICANT CHANGE UP (ref 0–0.5)
EOSINOPHIL NFR BLD AUTO: 2.9 % — SIGNIFICANT CHANGE UP (ref 0–6)
HCT VFR BLD CALC: 47.2 % — SIGNIFICANT CHANGE UP (ref 39–50)
HGB BLD-MCNC: 17.4 G/DL — HIGH (ref 13–17)
IMM GRANULOCYTES NFR BLD AUTO: 0.8 % — SIGNIFICANT CHANGE UP (ref 0–1.5)
LYMPHOCYTES # BLD AUTO: 2.42 K/UL — SIGNIFICANT CHANGE UP (ref 1–3.3)
LYMPHOCYTES # BLD AUTO: 38.5 % — SIGNIFICANT CHANGE UP (ref 13–44)
MCHC RBC-ENTMCNC: 30.4 PG — SIGNIFICANT CHANGE UP (ref 27–34)
MCHC RBC-ENTMCNC: 36.9 G/DL — HIGH (ref 32–36)
MCV RBC AUTO: 82.5 FL — SIGNIFICANT CHANGE UP (ref 80–100)
MONOCYTES # BLD AUTO: 0.57 K/UL — SIGNIFICANT CHANGE UP (ref 0–0.9)
MONOCYTES NFR BLD AUTO: 9.1 % — SIGNIFICANT CHANGE UP (ref 2–14)
NEUTROPHILS # BLD AUTO: 3.01 K/UL — SIGNIFICANT CHANGE UP (ref 1.8–7.4)
NEUTROPHILS NFR BLD AUTO: 47.9 % — SIGNIFICANT CHANGE UP (ref 43–77)
NRBC # BLD: 0 /100 WBCS — SIGNIFICANT CHANGE UP (ref 0–0)
PLATELET # BLD AUTO: 295 K/UL — SIGNIFICANT CHANGE UP (ref 150–400)
RBC # BLD: 5.72 M/UL — SIGNIFICANT CHANGE UP (ref 4.2–5.8)
RBC # FLD: 13.1 % — SIGNIFICANT CHANGE UP (ref 10.3–14.5)
WBC # BLD: 6.28 K/UL — SIGNIFICANT CHANGE UP (ref 3.8–10.5)
WBC # FLD AUTO: 6.28 K/UL — SIGNIFICANT CHANGE UP (ref 3.8–10.5)

## 2020-10-15 PROCEDURE — 99213 OFFICE O/P EST LOW 20 MIN: CPT

## 2020-10-15 NOTE — HISTORY OF PRESENT ILLNESS
[0 - No Distress] : Distress Level: 0 [de-identified] : Patient  feels well. no dizziness, no chest pain, no sob, no headaches, no blurry vision. he has NOT been wearing his CPAP machine. \par \par Labs 10/15/2020- WBC 6.28, Hb 17.4 g/dl, Hct 47.2%, MCV 82.5 , ,  \par \par No other changes in medical, surgical or social history since 6/19/2020.\par  [de-identified] : 59 yo M with polycythemia, JAK2 negative, unclear if primary polycythemia vera or secondary polycythemia, initially diagnosed because of a renal vein thrombosis, requiring coumadin, and incidentally found to have polycythemia. Coumadin was stopped after he developed hemarthrosis, however he had already finished 1.5 years of Coumadin. Currently he is only on ASA 81 mg, which he is not compliant with. When I first met him in 2014, we started off with phlebotomy every 2 weeks and then tapered as we got him to goal HCT below 45, however now it is likely that this is secondary polycythemia from JOHNNIE. He was lost to follow up for a few months, but has now been more compliant. He went for a sleep study and was diagnosed with JOHNNIE and is now on CPAP at night. He wears it on and off, sometimes he is not able to tolerate it. \par

## 2020-10-15 NOTE — ASSESSMENT
[FreeTextEntry1] : 59 yo M with PMHx of erythrocytosis. Patient is not able to tolerate the CPAP secondary to claustrophobia. I referred patient back to Pulmonary. \par \par Polycythemia- JAK2 is negative. Etiology of secondary polycythemia could be JOHNNIE\par \par Bone marrow biopsy normal in April 2014. \par Continue ASA 81 mg daily. \par \par Labs 10/15/2020- WBC 6.28, Hb 17.4 g/dl, Hct 47.2%, MCV 82.5 , ,  \par \par RTC 6 months.

## 2020-10-16 ENCOUNTER — APPOINTMENT (OUTPATIENT)
Dept: PULMONOLOGY | Facility: CLINIC | Age: 60
End: 2020-10-16
Payer: MEDICAID

## 2020-10-16 VITALS
HEIGHT: 66.97 IN | SYSTOLIC BLOOD PRESSURE: 121 MMHG | WEIGHT: 204 LBS | HEART RATE: 74 BPM | OXYGEN SATURATION: 96 % | DIASTOLIC BLOOD PRESSURE: 76 MMHG | BODY MASS INDEX: 32.02 KG/M2 | TEMPERATURE: 98.5 F | RESPIRATION RATE: 18 BRPM

## 2020-10-16 PROCEDURE — 99214 OFFICE O/P EST MOD 30 MIN: CPT

## 2020-10-19 NOTE — ADDENDUM
[FreeTextEntry1] : History of polycythemia, hypersomnia, JOHNNIE on CPAP, requires a replacement machine.  \par Patient was referred for a Mandatory Split sleep study with nasal pillows mask ASAP.

## 2020-10-19 NOTE — HISTORY OF PRESENT ILLNESS
[FreeTextEntry1] : Mr. Arias is a 60-year-old male with a history of HTN, HLD, DM2, mild JOHNNIE on CPAP (RDI 10/hr), and secondary polycythemia who presents for follow-up of his uncontrolled JOHNNIE. \par \par Last CPAP titration study in October 2015 with recommended CPAP pressure of 8 cm H2O. He was wearing a [employer] Simplus medium mask with heated humidification. However, mask was causing claustrophobia and he was recently switched to an Eson 2 medium nasal mask. He reports that the CPAP stifles him and he has excessively dry mouth. He has not used the CPAP in months because of the dry mouth and throat. He has an APEX CPAP with no humidification, but will need a new machine.\par \par He denies any chest pain, dyspnea, wheezing, or cough. He reports excessive daytime somnolence as he is not wearing the CPAP due to discomfort. Can fall asleep very easily reading a book or watching TV, but not with driving. Usually wakes up unrefreshed. Never wakes up with a headache, but always wakes up with a dry mouth. Has been told that he snores. [place] sleepiness score remains at 24.\par \par He was diagnosed with JOHNNIE in 2015, AHI 2.8 but RDI 10/hr. He then had a CPAP titration in October 2015 revealing optimal setting of CPAP 8 cm H2O. MSLT showed hypersomnolence with MSOL of 5.5 min, 1 SOREMP. Unfortunately, he does not wear CPAP regularly. Of note, initial diagnostic PSG from June 2014 with T-90% of 47.9% and T-85% of <1%. Subsequent diagnostic PSG from September 2015 with T-90% of 13%, with no time less than 85%.\par \par He has a history of secondary polycythemia for several years, previously requiring phlebotomy. He also has a history of right renal infract and splenic infarctions in 2012 for which he was previously on warfarin. His hypecoagulable workup was negative, including lupus anticoagulant , prothrombin gene mutation, Factor V Leiden , PNH and MGMTHFR. A KASIA had previously shown an aortic arch/descending aorta atheroma. Hematology workup for his polycythemia, including bone marrow biopsy, has been negative with normocellular bone marrow with erythroid predominant lineage and iron stores were absent. JAK2 testing in 2012 and 2013 negative, HGB was as high as 20.

## 2020-10-19 NOTE — ASSESSMENT
[FreeTextEntry1] : Mr. Arias is a 60-year-old male with a history of HTN, HLD, DM2, mild JOHNNIE on CPAP (RDI 10/hr), and secondary polycythemia who presents for follow-up of his uncontrolled JOHNNIE.He was diagnosed with JOHNNIE in 2015, AHI 2.8 but RDI 10/hr. CPAP titration in October 2015 revealed optimal setting of CPAP 8 cm H2O. MSLT showed hypersomnolence with MSOL of 5.5 min, 1 SOREMP. Unfortunately, he does not wear CPAP regularly because he cannot humidify his APEX CPAP machine and has excessive dry mouth even with nasal mask, which he reports as stifling. Of note, initial diagnostic PSG from June 2014 with T-90% of 47.9% and T-85% of <1%. Subsequent diagnostic PSG from September 2015 with T-90% of 13%, with no time less than 85%.\par \par 1. JOHNNIE with EDS:\par - Start Auto CPAP 4 - 20 cm H2O\par - Mandatory heated humidification \par - Use with nasal pillows or nasal mask\par - Patient counseled regarding adherence with therapy, especially given secondary polycythemia likely due to chronic nocturnal hypoxemia\par - If EDS persists despite CPAP therapy, then may require modafinil therapy\par - Consider sleep evaluation, not a candidate for INSPIRE given AHI<20\par - May need nocturnal home oxygen or dental eval for mandibular advancement oral appliance therapy\par \par 2. Nocturnal hypoxemia with secondary polycythemia:\par - Exact etiology unclear as nocturnal hypoxemia is not entirely associated with sleep-disordered breathing events\par - Diagnostic PSG (Sept 2015) with no evidence of hypoventilation or hypercapnia (transcutaneous CO2 41 mm Hg)\par - ABG on room air (July 2019) with pH 7.40, pCO2 42, paO2 72, calculated HCO3 25, SaO2 95%\par - Has elevated A-a gradient of 25\par - 2D-echo from October 2019 with normal left and right ventricular size and systolic function. Normal LA. No significant valvular disease. No evidence of pulmonary hypertension\par - Pending V/Q scan, he has a history of right kidney and splenic infarctions in 2012 previously requiring warfarin\par - Previous hypercoagulable workup negative, including lupus anticoagulant , prothrombin gene mutation, Factor V Leiden, PNH and MGMTHFR\par - Hematology workup for his polycythemia, including bone marrow biopsy, has been negative with normocellular bone marrow with erythroid predominant lineage and iron stores absent. JAK2 testing in 2012 and 2013 negative, HGB was as high as 20 at that time\par - Erythropoietin level in Oct 2019 6.3, which is normal\par - If remains unable to tolerate CPAP therapy, then consider initiating nocturnal oxygen via NC to treat his chronic hypoxemia\par \par 3. Follow-up after 1 month of adherence with APAP if approved by insurance, may require repeat diagnostic PSG given that last sleep study was in 2015

## 2020-10-19 NOTE — PHYSICAL EXAM
[General Appearance - Well Developed] : well developed [Normal Appearance] : normal appearance [General Appearance - In No Acute Distress] : no acute distress [General Appearance - Well Nourished] : well nourished [Well Groomed] : well groomed [Normal Conjunctiva] : the conjunctiva exhibited no abnormalities [Neck Appearance] : the appearance of the neck was normal [Neck Cervical Mass (___cm)] : no neck mass was observed [Heart Rate And Rhythm] : heart rate and rhythm were normal [Heart Sounds] : normal S1 and S2 [Arterial Pulses Normal] : the arterial pulses were normal [Murmurs] : no murmurs present [Edema] : no peripheral edema present [Auscultation Breath Sounds / Voice Sounds] : lungs were clear to auscultation bilaterally [Respiration, Rhythm And Depth] : normal respiratory rhythm and effort [Exaggerated Use Of Accessory Muscles For Inspiration] : no accessory muscle use [Abdomen Tenderness] : non-tender [Abdomen Soft] : soft [Nail Clubbing] : no clubbing of the fingernails [Abnormal Walk] : normal gait [Cyanosis, Localized] : no localized cyanosis [Cranial Nerves] : cranial nerves 2-12 were intact [Motor Exam] : the motor exam was normal [Oriented To Time, Place, And Person] : oriented to person, place, and time [Affect] : the affect was normal [Mood] : the mood was normal [Skin Color & Pigmentation] : normal skin color and pigmentation [] : no rash [Skin Turgor] : normal skin turgor [FreeTextEntry1] : crowded

## 2020-10-19 NOTE — REVIEW OF SYSTEMS
[Nonrestorative Sleep] : nonrestorative sleep [Snoring] : snoring [Awakes With Dry Mouth] : awakes with dry mouth [Negative] : Pulmonary Hypertension [Awakes With Headache] : awakes without a headache

## 2020-10-30 ENCOUNTER — TRANSCRIPTION ENCOUNTER (OUTPATIENT)
Age: 60
End: 2020-10-30

## 2021-04-22 ENCOUNTER — OUTPATIENT (OUTPATIENT)
Dept: OUTPATIENT SERVICES | Facility: HOSPITAL | Age: 61
LOS: 1 days | Discharge: HOME | End: 2021-04-22

## 2021-04-22 DIAGNOSIS — D45 POLYCYTHEMIA VERA: ICD-10-CM

## 2021-04-26 ENCOUNTER — RESULT REVIEW (OUTPATIENT)
Age: 61
End: 2021-04-26

## 2021-04-26 ENCOUNTER — APPOINTMENT (OUTPATIENT)
Dept: HEMATOLOGY ONCOLOGY | Facility: CLINIC | Age: 61
End: 2021-04-26
Payer: MEDICAID

## 2021-04-26 VITALS
DIASTOLIC BLOOD PRESSURE: 90 MMHG | OXYGEN SATURATION: 97 % | RESPIRATION RATE: 18 BRPM | HEART RATE: 70 BPM | SYSTOLIC BLOOD PRESSURE: 143 MMHG | BODY MASS INDEX: 31.07 KG/M2 | HEIGHT: 66.97 IN | WEIGHT: 197.95 LBS | TEMPERATURE: 97.9 F

## 2021-04-26 LAB
BASOPHILS # BLD AUTO: 0.06 K/UL — SIGNIFICANT CHANGE UP (ref 0–0.2)
BASOPHILS NFR BLD AUTO: 1 % — SIGNIFICANT CHANGE UP (ref 0–2)
EOSINOPHIL # BLD AUTO: 0.11 K/UL — SIGNIFICANT CHANGE UP (ref 0–0.5)
EOSINOPHIL NFR BLD AUTO: 1.9 % — SIGNIFICANT CHANGE UP (ref 0–6)
HCT VFR BLD CALC: 42.5 % — SIGNIFICANT CHANGE UP (ref 39–50)
HGB BLD-MCNC: 15.5 G/DL — SIGNIFICANT CHANGE UP (ref 13–17)
IMM GRANULOCYTES NFR BLD AUTO: 0.2 % — SIGNIFICANT CHANGE UP (ref 0–1.5)
LYMPHOCYTES # BLD AUTO: 2.65 K/UL — SIGNIFICANT CHANGE UP (ref 1–3.3)
LYMPHOCYTES # BLD AUTO: 45.7 % — HIGH (ref 13–44)
MCHC RBC-ENTMCNC: 30.1 PG — SIGNIFICANT CHANGE UP (ref 27–34)
MCHC RBC-ENTMCNC: 36.5 G/DL — HIGH (ref 32–36)
MCV RBC AUTO: 82.5 FL — SIGNIFICANT CHANGE UP (ref 80–100)
MONOCYTES # BLD AUTO: 0.56 K/UL — SIGNIFICANT CHANGE UP (ref 0–0.9)
MONOCYTES NFR BLD AUTO: 9.7 % — SIGNIFICANT CHANGE UP (ref 2–14)
NEUTROPHILS # BLD AUTO: 2.41 K/UL — SIGNIFICANT CHANGE UP (ref 1.8–7.4)
NEUTROPHILS NFR BLD AUTO: 41.5 % — LOW (ref 43–77)
NRBC # BLD: 0 /100 WBCS — SIGNIFICANT CHANGE UP (ref 0–0)
PLATELET # BLD AUTO: 307 K/UL — SIGNIFICANT CHANGE UP (ref 150–400)
RBC # BLD: 5.15 M/UL — SIGNIFICANT CHANGE UP (ref 4.2–5.8)
RBC # FLD: 13.2 % — SIGNIFICANT CHANGE UP (ref 10.3–14.5)
WBC # BLD: 5.8 K/UL — SIGNIFICANT CHANGE UP (ref 3.8–10.5)
WBC # FLD AUTO: 5.8 K/UL — SIGNIFICANT CHANGE UP (ref 3.8–10.5)

## 2021-04-26 PROCEDURE — 99214 OFFICE O/P EST MOD 30 MIN: CPT

## 2021-04-26 PROCEDURE — 99072 ADDL SUPL MATRL&STAF TM PHE: CPT

## 2021-04-26 NOTE — PHYSICAL EXAM
[Restricted in physically strenuous activity but ambulatory and able to carry out work of a light or sedentary nature] : Status 1- Restricted in physically strenuous activity but ambulatory and able to carry out work of a light or sedentary nature, e.g., light house work, office work [Obese] : obese [Normal] : RRR, normal S1S2, no murmurs, rubs, gallops

## 2021-04-26 NOTE — HISTORY OF PRESENT ILLNESS
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 Coumadin was stopped after he developed hemarthrosis, however he had already finished 1.5 years of Coumadin. Currently he is only on ASA 81 mg, which he is not compliant with. When I first met him in 2014, we started off with phlebotomy every 2 weeks and then tapered as we got him to goal HCT below 45, however now it is likely that this is secondary polycythemia from JOHNNIE. He was lost to follow up for a few months, but has now been more compliant. He went for a sleep study and was diagnosed with JOHNNIE and is now on CPAP at night. He wears it on and off, sometimes he is not able to tolerate it. \par  [de-identified] : Patient  feels well. no dizziness, no chest pain, no sob, no headaches, no blurry vision. he has NOT been wearing his CPAP machine. \par \par 4/26/21: WBC 5.8, Hb 15.5 g/dl, hct 42.5%, MCV 82.5, PLTs 307. \par \par No other changes in medical, surgical or social history since 10/15/2020.\par

## 2021-04-26 NOTE — ASSESSMENT
[FreeTextEntry1] : 59 yo M with PMHx of erythrocytosis. Patient is not able to tolerate the CPAP secondary to claustrophobia. I referred patient back to Pulmonary. \par \par Polycythemia- JAK2 is negative. Etiology of secondary polycythemia could be JOHNNIE\par \par Bone marrow biopsy normal in April 2014. \par Continue ASA 81 mg daily. \par \par 4/26/21: WBC 5.8, Hb 15.5 g/dl, hct 42.5%, MCV 82.5, PLTs 307. \par \par - HTN: Lisinopril, Amlodipine, ASA\par \par - HLD : Atorvastatin\par \par - Diabetes: on Metformin \par \par \par RTC 6 months.

## 2021-04-27 ENCOUNTER — NON-APPOINTMENT (OUTPATIENT)
Age: 61
End: 2021-04-27

## 2021-04-27 LAB
ALBUMIN SERPL ELPH-MCNC: 4.7 G/DL
ALP BLD-CCNC: 86 U/L
ALT SERPL-CCNC: 14 U/L
ANION GAP SERPL CALC-SCNC: 11 MMOL/L
AST SERPL-CCNC: 13 U/L
BILIRUB SERPL-MCNC: 0.5 MG/DL
BUN SERPL-MCNC: 13 MG/DL
CALCIUM SERPL-MCNC: 9.7 MG/DL
CHLORIDE SERPL-SCNC: 104 MMOL/L
CO2 SERPL-SCNC: 24 MMOL/L
CREAT SERPL-MCNC: 1.04 MG/DL
GLUCOSE SERPL-MCNC: 105 MG/DL
POTASSIUM SERPL-SCNC: 4.3 MMOL/L
PROT SERPL-MCNC: 7.5 G/DL
SODIUM SERPL-SCNC: 139 MMOL/L

## 2021-05-14 ENCOUNTER — TRANSCRIPTION ENCOUNTER (OUTPATIENT)
Age: 61
End: 2021-05-14

## 2021-06-25 NOTE — PHYSICAL EXAM
Spoke to patient. He was seen in the ER for nose bleeds. No packing was placed but he would like to be seen soon due to recurrent epistaxis. Appointment scheduled with Dr. Martinez 6/16. All information given to patient.    Ermelinda Julian RN  [facility]  [phone]     [Normal] : affect appropriate [Restricted in physically strenuous activity but ambulatory and able to carry out work of a light or sedentary nature] : Status 1- Restricted in physically strenuous activity but ambulatory and able to carry out work of a light or sedentary nature, e.g., light house work, office work [Obese] : obese

## 2021-07-21 ENCOUNTER — RESULT REVIEW (OUTPATIENT)
Age: 61
End: 2021-07-21

## 2021-07-21 ENCOUNTER — APPOINTMENT (OUTPATIENT)
Dept: INTERNAL MEDICINE | Facility: CLINIC | Age: 61
End: 2021-07-21
Payer: MEDICAID

## 2021-07-21 ENCOUNTER — OUTPATIENT (OUTPATIENT)
Dept: OUTPATIENT SERVICES | Facility: HOSPITAL | Age: 61
LOS: 1 days | End: 2021-07-21

## 2021-07-21 VITALS
HEART RATE: 90 BPM | SYSTOLIC BLOOD PRESSURE: 120 MMHG | DIASTOLIC BLOOD PRESSURE: 80 MMHG | BODY MASS INDEX: 30.61 KG/M2 | OXYGEN SATURATION: 94 % | HEIGHT: 67 IN | WEIGHT: 195 LBS

## 2021-07-21 VITALS — TEMPERATURE: 97.9 F

## 2021-07-21 DIAGNOSIS — H91.90 UNSPECIFIED HEARING LOSS, UNSPECIFIED EAR: ICD-10-CM

## 2021-07-21 LAB
A1C WITH ESTIMATED AVERAGE GLUCOSE RESULT: 6.7 % — HIGH (ref 4–5.6)
ANION GAP SERPL CALC-SCNC: 16 MMOL/L — HIGH (ref 7–14)
BASOPHILS # BLD AUTO: 0.05 K/UL — SIGNIFICANT CHANGE UP (ref 0–0.2)
BASOPHILS NFR BLD AUTO: 0.7 % — SIGNIFICANT CHANGE UP (ref 0–2)
BUN SERPL-MCNC: 8 MG/DL — SIGNIFICANT CHANGE UP (ref 7–23)
CALCIUM SERPL-MCNC: 10.4 MG/DL — SIGNIFICANT CHANGE UP (ref 8.4–10.5)
CHLORIDE SERPL-SCNC: 100 MMOL/L — SIGNIFICANT CHANGE UP (ref 98–107)
CHOLEST SERPL-MCNC: 171 MG/DL — SIGNIFICANT CHANGE UP
CO2 SERPL-SCNC: 23 MMOL/L — SIGNIFICANT CHANGE UP (ref 22–31)
CREAT SERPL-MCNC: 1.1 MG/DL — SIGNIFICANT CHANGE UP (ref 0.5–1.3)
EOSINOPHIL # BLD AUTO: 0.12 K/UL — SIGNIFICANT CHANGE UP (ref 0–0.5)
EOSINOPHIL NFR BLD AUTO: 1.6 % — SIGNIFICANT CHANGE UP (ref 0–6)
ESTIMATED AVERAGE GLUCOSE: 146 — SIGNIFICANT CHANGE UP
GLUCOSE BLDC GLUCOMTR-MCNC: 140
GLUCOSE SERPL-MCNC: 139 MG/DL — HIGH (ref 70–99)
HCT VFR BLD CALC: 49.6 % — SIGNIFICANT CHANGE UP (ref 39–50)
HDLC SERPL-MCNC: 56 MG/DL — SIGNIFICANT CHANGE UP
HGB BLD-MCNC: 17.4 G/DL — HIGH (ref 13–17)
IANC: 4.98 K/UL — SIGNIFICANT CHANGE UP (ref 1.5–8.5)
IMM GRANULOCYTES NFR BLD AUTO: 0.4 % — SIGNIFICANT CHANGE UP (ref 0–1.5)
LIPID PNL WITH DIRECT LDL SERPL: 98 MG/DL — SIGNIFICANT CHANGE UP
LYMPHOCYTES # BLD AUTO: 1.69 K/UL — SIGNIFICANT CHANGE UP (ref 1–3.3)
LYMPHOCYTES # BLD AUTO: 22.1 % — SIGNIFICANT CHANGE UP (ref 13–44)
MCHC RBC-ENTMCNC: 29.6 PG — SIGNIFICANT CHANGE UP (ref 27–34)
MCHC RBC-ENTMCNC: 35.1 GM/DL — SIGNIFICANT CHANGE UP (ref 32–36)
MCV RBC AUTO: 84.5 FL — SIGNIFICANT CHANGE UP (ref 80–100)
MONOCYTES # BLD AUTO: 0.76 K/UL — SIGNIFICANT CHANGE UP (ref 0–0.9)
MONOCYTES NFR BLD AUTO: 10 % — SIGNIFICANT CHANGE UP (ref 2–14)
NEUTROPHILS # BLD AUTO: 4.98 K/UL — SIGNIFICANT CHANGE UP (ref 1.8–7.4)
NEUTROPHILS NFR BLD AUTO: 65.2 % — SIGNIFICANT CHANGE UP (ref 43–77)
NON HDL CHOLESTEROL: 115 MG/DL — SIGNIFICANT CHANGE UP
NRBC # BLD: 0 /100 WBCS — SIGNIFICANT CHANGE UP
NRBC # FLD: 0 K/UL — SIGNIFICANT CHANGE UP
PLATELET # BLD AUTO: 302 K/UL — SIGNIFICANT CHANGE UP (ref 150–400)
POTASSIUM SERPL-MCNC: 4.6 MMOL/L — SIGNIFICANT CHANGE UP (ref 3.5–5.3)
POTASSIUM SERPL-SCNC: 4.6 MMOL/L — SIGNIFICANT CHANGE UP (ref 3.5–5.3)
RBC # BLD: 5.87 M/UL — HIGH (ref 4.2–5.8)
RBC # FLD: 13.6 % — SIGNIFICANT CHANGE UP (ref 10.3–14.5)
SODIUM SERPL-SCNC: 139 MMOL/L — SIGNIFICANT CHANGE UP (ref 135–145)
TRIGL SERPL-MCNC: 86 MG/DL — SIGNIFICANT CHANGE UP
WBC # BLD: 7.63 K/UL — SIGNIFICANT CHANGE UP (ref 3.8–10.5)
WBC # FLD AUTO: 7.63 K/UL — SIGNIFICANT CHANGE UP (ref 3.8–10.5)

## 2021-07-21 PROCEDURE — 99396 PREV VISIT EST AGE 40-64: CPT | Mod: GC

## 2021-07-22 ENCOUNTER — NON-APPOINTMENT (OUTPATIENT)
Age: 61
End: 2021-07-22

## 2021-07-22 NOTE — PHYSICAL EXAM
[No Acute Distress] : no acute distress [Well Nourished] : well nourished [Well Developed] : well developed [Well-Appearing] : well-appearing [Normal Voice/Communication] : normal voice/communication [Normal Sclera/Conjunctiva] : normal sclera/conjunctiva [Normal Outer Ear/Nose] : the outer ears and nose were normal in appearance [Normal Oropharynx] : the oropharynx was normal [No JVD] : no jugular venous distention [No Lymphadenopathy] : no lymphadenopathy [Supple] : supple [Thyroid Normal, No Nodules] : the thyroid was normal and there were no nodules present [No Respiratory Distress] : no respiratory distress  [No Accessory Muscle Use] : no accessory muscle use [Clear to Auscultation] : lungs were clear to auscultation bilaterally [Normal Rate] : normal rate  [Regular Rhythm] : with a regular rhythm [Normal S1, S2] : normal S1 and S2 [No Murmur] : no murmur heard [No Carotid Bruits] : no carotid bruits [No Abdominal Bruit] : a ~M bruit was not heard ~T in the abdomen [No Varicosities] : no varicosities [Pedal Pulses Present] : the pedal pulses are present [No Edema] : there was no peripheral edema [No Palpable Aorta] : no palpable aorta [No Extremity Clubbing/Cyanosis] : no extremity clubbing/cyanosis [Soft] : abdomen soft [Non Tender] : non-tender [Non-distended] : non-distended [No Masses] : no abdominal mass palpated [No HSM] : no HSM [Normal Bowel Sounds] : normal bowel sounds [Normal Posterior Cervical Nodes] : no posterior cervical lymphadenopathy [Normal Anterior Cervical Nodes] : no anterior cervical lymphadenopathy [No Rash] : no rash [Coordination Grossly Intact] : coordination grossly intact [No Focal Deficits] : no focal deficits [Normal Gait] : normal gait [Deep Tendon Reflexes (DTR)] : deep tendon reflexes were 2+ and symmetric [Normal Affect] : the affect was normal [Normal Insight/Judgement] : insight and judgment were intact

## 2021-07-30 DIAGNOSIS — Z00.00 ENCOUNTER FOR GENERAL ADULT MEDICAL EXAMINATION WITHOUT ABNORMAL FINDINGS: ICD-10-CM

## 2021-07-30 DIAGNOSIS — E78.5 HYPERLIPIDEMIA, UNSPECIFIED: ICD-10-CM

## 2021-07-30 DIAGNOSIS — D75.1 SECONDARY POLYCYTHEMIA: ICD-10-CM

## 2021-07-30 DIAGNOSIS — G47.33 OBSTRUCTIVE SLEEP APNEA (ADULT) (PEDIATRIC): ICD-10-CM

## 2021-07-30 DIAGNOSIS — E11.9 TYPE 2 DIABETES MELLITUS WITHOUT COMPLICATIONS: ICD-10-CM

## 2021-07-30 DIAGNOSIS — H91.90 UNSPECIFIED HEARING LOSS, UNSPECIFIED EAR: ICD-10-CM

## 2021-07-30 DIAGNOSIS — I10 ESSENTIAL (PRIMARY) HYPERTENSION: ICD-10-CM

## 2021-07-30 NOTE — HISTORY OF PRESENT ILLNESS
[FreeTextEntry1] : Annual physical evaluation  [de-identified] : Pt is a 59 y/o M with PMH of HTN, HLD, DM2, JOHNNIE on CPAP w/ secondary polycythemia, splenic and renal vein thrombosis (was on coumadin, stopped 2/2 hemarthrosis), hearing loss,  presents for an annual physical exam. Follows with pulmonology and hematology, who manage the JOHNNIE and polycythemia. Says currently is not on CPAP at home, as previously could not sleep with it. He mentioned that he was waiting for a new machine but did not receive it. Since we last saw him a year ago, denies onset of any new symptoms. Denies CP, SOB, palpitations, headaches, decreased vision, F/C/N/V, constipation/diarrhea, leg swelling.

## 2021-07-30 NOTE — PLAN
[FreeTextEntry1] : HCM:\par - Was referred last year for colonoscopy but pt did not go. Counseled on importance of colon cancer screening and referred pt for colonoscopy. \par - Pt had pneumovax in 2014, refused dose due to recent covid vaccine. Will f/u next visit.\par - COVID vaccine: Moderna: May & July \par \par

## 2021-07-30 NOTE — REVIEW OF SYSTEMS
[Fever] : no fever [Chills] : no chills [Fatigue] : no fatigue [Night Sweats] : no night sweats [Recent Change In Weight] : ~T no recent weight change [Pain] : no pain [Vision Problems] : no vision problems [Chest Pain] : no chest pain [Palpitations] : no palpitations [Lower Ext Edema] : no lower extremity edema [Orthopena] : no orthopnea [Paroxysmal Nocturnal Dyspnea] : no paroxysmal nocturnal dyspnea [Shortness Of Breath] : no shortness of breath [Wheezing] : no wheezing [Cough] : no cough [Dyspnea on Exertion] : not dyspnea on exertion [Dysuria] : no dysuria [Hematuria] : no hematuria [Joint Pain] : no joint pain [Joint Stiffness] : no joint stiffness [Muscle Pain] : no muscle pain [Headache] : no headache [Dizziness] : no dizziness [Fainting] : no fainting [Unsteady Walk] : no ataxia [Memory Loss] : no memory loss

## 2021-07-30 NOTE — ASSESSMENT
[FreeTextEntry1] : Pt is a 59 y/o M with PMH of HTN, HLD, DM2, JOHNNIE on CPAP w/ secondary polycythemia, splenic and renal vein thrombosis (was on coumadin, stopped 2/2 hemarthrosis), hearing loss,  presents for an annual physical exam w/o new complaints.  Statement Selected

## 2021-07-30 NOTE — HEALTH RISK ASSESSMENT
[Excellent] : ~his/her~  mood as  excellent [No] : No [No falls in past year] : Patient reported no falls in the past year [Patient declined colonoscopy] : Patient declined colonoscopy [HIV test declined] : HIV test declined [Hepatitis C test declined] : Hepatitis C test declined [None] : None [] :  [Feels Safe at Home] : Feels safe at home [Fully functional (bathing, dressing, toileting, transferring, walking, feeding)] : Fully functional (bathing, dressing, toileting, transferring, walking, feeding) [Fully functional (using the telephone, shopping, preparing meals, housekeeping, doing laundry, using] : Fully functional and needs no help or supervision to perform IADLs (using the telephone, shopping, preparing meals, housekeeping, doing laundry, using transportation, managing medications and managing finances) [Reports changes in hearing] : Reports changes in hearing [] : No [de-identified] : Walks [de-identified] : balanced diabetic diet with fruits and vegetables [Change in mental status noted] : No change in mental status noted [Language] : denies difficulty with language [Behavior] : denies difficulty with behavior [Learning/Retaining New Information] : denies difficulty learning/retaining new information [Handling Complex Tasks] : denies difficulty handling complex tasks [Reasoning] : denies difficulty with reasoning [Spatial Ability and Orientation] : denies difficulty with spatial ability and orientation [Reports changes in vision] : Reports no changes in vision [Reports changes in dental health] : Reports no changes in dental health [ColonoscopyComments] : referred last year, pt did not go.  [de-identified] : worsening of chronic hearing loss

## 2021-08-02 ENCOUNTER — RESULT REVIEW (OUTPATIENT)
Age: 61
End: 2021-08-02

## 2021-08-02 ENCOUNTER — NON-APPOINTMENT (OUTPATIENT)
Age: 61
End: 2021-08-02

## 2021-08-02 LAB
COLLECT DURATION TIME UR: 24 HR — SIGNIFICANT CHANGE UP
TOTAL VOLUME - 24 HOUR: 3040 ML — SIGNIFICANT CHANGE UP
URINE CREATININE CALCULATION: 1.8 G/24 H — SIGNIFICANT CHANGE UP (ref 0.8–1.8)

## 2021-08-03 ENCOUNTER — APPOINTMENT (OUTPATIENT)
Dept: PULMONOLOGY | Facility: CLINIC | Age: 61
End: 2021-08-03
Payer: MEDICAID

## 2021-08-03 VITALS
WEIGHT: 195 LBS | HEART RATE: 81 BPM | DIASTOLIC BLOOD PRESSURE: 79 MMHG | BODY MASS INDEX: 30.61 KG/M2 | OXYGEN SATURATION: 97 % | TEMPERATURE: 96.8 F | RESPIRATION RATE: 16 BRPM | HEIGHT: 67 IN | SYSTOLIC BLOOD PRESSURE: 124 MMHG

## 2021-08-03 LAB
MICROALBUMIN 24H UR-MRATE: <36.5 MG/24HR — HIGH (ref 0–29.9)
MICROALBUMIN ?TM UR-MRATE: <25.3 UG/MIN — HIGH (ref 0–19.9)
MICROALBUMIN UR-MCNC: <1.2 MG/DL — SIGNIFICANT CHANGE UP
URINE CREATININE CALCULATION: 1.7 G/24 H — SIGNIFICANT CHANGE UP (ref 1–2)

## 2021-08-03 PROCEDURE — 99214 OFFICE O/P EST MOD 30 MIN: CPT

## 2021-08-03 NOTE — ASSESSMENT
[FreeTextEntry1] : Mr. Arias is a 61-year-old male with a history of HTN, HLD, DM2, mild JOHNNIE on CPAP (RDI 10/hr), and secondary polycythemia who presents for follow-up of his uncontrolled JOHNNIE.He was diagnosed with JOHNNIE in 2015, AHI 2.8 but RDI 10/hr. CPAP titration in October 2015 revealed optimal setting of CPAP 8 cm H2O. MSLT showed hypersomnolence with MSOL of 5.5 min, 1 SOREMP. Unfortunately, he does not wear CPAP regularly because he cannot humidify his APEX CPAP machine and has excessive dry mouth even with nasal mask, which he reports as stifling. He never received his AutoCPAP machine in Oct 2020. Of note, initial diagnostic PSG from June 2014 with T-90% of 47.9% and T-85% of <1%. Subsequent diagnostic PSG from September 2015 with T-90% of 13%, with no time less than 85%.\par \par 1. JOHNNIE with EDS:\par - Start Auto CPAP 4 - 20 cm H2O\par - Mandatory heated humidification \par - Use with nasal pillows or nasal mask\par - Patient counseled regarding adherence with therapy, especially given secondary polycythemia likely due to chronic nocturnal hypoxemia\par - If EDS persists despite CPAP therapy, then may require modafinil therapy\par - Consider sleep evaluation, not a candidate for INSPIRE given AHI<20\par - May need nocturnal home oxygen or dental eval for mandibular advancement oral appliance therapy\par \par 2. Nocturnal hypoxemia with secondary polycythemia:\par - Exact etiology unclear as nocturnal hypoxemia is not entirely associated with sleep-disordered breathing events\par - Diagnostic PSG (Sept 2015) with no evidence of hypoventilation or hypercapnia (transcutaneous CO2 41 mm Hg)\par - ABG on room air (July 2019) with pH 7.40, pCO2 42, paO2 72, calculated HCO3 25, SaO2 95%\par - Has elevated A-a gradient of 25\par - 2D-echo from October 2019 with normal left and right ventricular size and systolic function. Normal LA. No significant valvular disease. No evidence of pulmonary hypertension\par - Check V/Q scan, he has a history of right kidney and splenic infarctions in 2012 previously requiring warfarin\par - Previous hypercoagulable workup negative, including lupus anticoagulant , prothrombin gene mutation, Factor V Leiden, PNH and MGMTHFR\par - Hematology workup for his polycythemia, including bone marrow biopsy, has been negative with normocellular bone marrow with erythroid predominant lineage and iron stores absent. JAK2 testing in 2012 and 2013 negative, HGB was as high as 20 at that time\par - Erythropoietin level in Oct 2019 6.3, which is normal\par - If remains unable to tolerate CPAP therapy, then consider initiating nocturnal oxygen via NC to treat his chronic hypoxemia\par \par 3. Follow-up after 1 month of adherence with APAP if approved by insurance, may require repeat diagnostic PSG given that last sleep study was in 2015. Alternatively, can perform an overnight pulse oximetry to assess for resolution of nocturnal hypoxemia with APAP therapy

## 2021-08-03 NOTE — HISTORY OF PRESENT ILLNESS
[FreeTextEntry1] : Mr. Arias is a 61-year-old male with a history of HTN, HLD, DM2, mild JOHNNIE on CPAP (RDI 10/hr), and secondary polycythemia who presents for follow-up of his uncontrolled JOHNNIE. He was last seen in Oct 2020 and was supposed to be started on Auto CPAP 4-20 cm H2O with mandatory heated humidification and nasal pillows. However, he reports that he never received the CPAP machine, although he did not inquire. He goes to sleep at various times at night and only wakes up to use the bathroom. He wakes up at 530-6am, but can go back to sleep after. He sleeps throughout the day if he is comfortable. He falls asleep within 4-5 minutes. He takes naps at home of 1-2 hours. He reports EDS and continues to wake up unrefreshed with nonrestorative sleep. Can fall asleep very easily watching TV or reading a book. No morning headache, but wakes up with a dry mouth. Was told that he snores. His HGB remains elevated at 17.4. He has lost 9 lbs since last visit. ESS is 24. He denies any chest pain, dyspnea, wheezing, or cough.\par \par Last CPAP titration study in October 2015 with recommended CPAP pressure of 8 cm H2O. He was wearing a [employer] Simplus medium mask with heated humidification. However, mask was causing claustrophobia and he was recently switched to an Eson 2 medium nasal mask. He reports that the CPAP stifles him and he has excessively dry mouth. He has not used the CPAP in months because of the dry mouth and throat. He has an APEX CPAP with no humidification, but will need a new machine.\par \par He was diagnosed with JOHNNIE in 2015, AHI 2.8 but RDI 10/hr. He then had a CPAP titration in October 2015 revealing optimal setting of CPAP 8 cm H2O. MSLT showed hypersomnolence with MSOL of 5.5 min, 1 SOREMP. Unfortunately, he does not wear CPAP regularly. Of note, initial diagnostic PSG from June 2014 with T-90% of 47.9% and T-85% of <1%. Subsequent diagnostic PSG from September 2015 with T-90% of 13%, with no time less than 85%.\par \par He has a history of secondary polycythemia for several years, previously requiring phlebotomy. He also has a history of right renal infract and splenic infarctions in 2012 for which he was previously on warfarin. His hypecoagulable workup was negative, including lupus anticoagulant , prothrombin gene mutation, Factor V Leiden , PNH and MGMTHFR. A KASIA had previously shown an aortic arch/descending aorta atheroma. Hematology workup for his polycythemia, including bone marrow biopsy, has been negative with normocellular bone marrow with erythroid predominant lineage and iron stores were absent. JAK2 testing in 2012 and 2013 negative, HGB was as high as 20.

## 2021-08-03 NOTE — PHYSICAL EXAM
[General Appearance - Well Developed] : well developed [Normal Appearance] : normal appearance [Well Groomed] : well groomed [General Appearance - Well Nourished] : well nourished [General Appearance - In No Acute Distress] : no acute distress [Normal Conjunctiva] : the conjunctiva exhibited no abnormalities [Neck Appearance] : the appearance of the neck was normal [Neck Cervical Mass (___cm)] : no neck mass was observed [Heart Rate And Rhythm] : heart rate and rhythm were normal [Heart Sounds] : normal S1 and S2 [Murmurs] : no murmurs present [Arterial Pulses Normal] : the arterial pulses were normal [Edema] : no peripheral edema present [Respiration, Rhythm And Depth] : normal respiratory rhythm and effort [Exaggerated Use Of Accessory Muscles For Inspiration] : no accessory muscle use [Auscultation Breath Sounds / Voice Sounds] : lungs were clear to auscultation bilaterally [Abdomen Soft] : soft [Abdomen Tenderness] : non-tender [Abnormal Walk] : normal gait [Nail Clubbing] : no clubbing of the fingernails [Cyanosis, Localized] : no localized cyanosis [Cranial Nerves] : cranial nerves 2-12 were intact [Motor Exam] : the motor exam was normal [Oriented To Time, Place, And Person] : oriented to person, place, and time [Affect] : the affect was normal [Mood] : the mood was normal [Skin Color & Pigmentation] : normal skin color and pigmentation [Skin Turgor] : normal skin turgor [] : no rash [FreeTextEntry1] : crowded

## 2021-08-04 ENCOUNTER — NON-APPOINTMENT (OUTPATIENT)
Age: 61
End: 2021-08-04

## 2021-08-05 ENCOUNTER — RESULT REVIEW (OUTPATIENT)
Age: 61
End: 2021-08-05

## 2021-08-10 ENCOUNTER — OUTPATIENT (OUTPATIENT)
Dept: OUTPATIENT SERVICES | Facility: HOSPITAL | Age: 61
LOS: 1 days | End: 2021-08-10
Payer: MEDICAID

## 2021-08-10 ENCOUNTER — APPOINTMENT (OUTPATIENT)
Dept: RADIOLOGY | Facility: IMAGING CENTER | Age: 61
End: 2021-08-10
Payer: MEDICAID

## 2021-08-10 DIAGNOSIS — I82.409 ACUTE EMBOLISM AND THROMBOSIS OF UNSPECIFIED DEEP VEINS OF UNSPECIFIED LOWER EXTREMITY: ICD-10-CM

## 2021-08-10 DIAGNOSIS — Z00.8 ENCOUNTER FOR OTHER GENERAL EXAMINATION: ICD-10-CM

## 2021-08-10 PROCEDURE — 71046 X-RAY EXAM CHEST 2 VIEWS: CPT

## 2021-08-10 PROCEDURE — 71046 X-RAY EXAM CHEST 2 VIEWS: CPT | Mod: 26

## 2021-08-12 ENCOUNTER — OUTPATIENT (OUTPATIENT)
Dept: OUTPATIENT SERVICES | Facility: HOSPITAL | Age: 61
LOS: 1 days | End: 2021-08-12

## 2021-08-12 ENCOUNTER — APPOINTMENT (OUTPATIENT)
Dept: RADIOLOGY | Facility: HOSPITAL | Age: 61
End: 2021-08-12
Payer: MEDICAID

## 2021-08-12 ENCOUNTER — APPOINTMENT (OUTPATIENT)
Dept: NUCLEAR MEDICINE | Facility: HOSPITAL | Age: 61
End: 2021-08-12
Payer: MEDICAID

## 2021-08-12 ENCOUNTER — RESULT REVIEW (OUTPATIENT)
Age: 61
End: 2021-08-12

## 2021-08-12 ENCOUNTER — OUTPATIENT (OUTPATIENT)
Dept: OUTPATIENT SERVICES | Facility: HOSPITAL | Age: 61
LOS: 1 days | End: 2021-08-12
Payer: MEDICAID

## 2021-08-12 DIAGNOSIS — G47.34 IDIOPATHIC SLEEP RELATED NONOBSTRUCTIVE ALVEOLAR HYPOVENTILATION: ICD-10-CM

## 2021-08-12 DIAGNOSIS — I82.409 ACUTE EMBOLISM AND THROMBOSIS OF UNSPECIFIED DEEP VEINS OF UNSPECIFIED LOWER EXTREMITY: ICD-10-CM

## 2021-08-12 PROCEDURE — 78580 LUNG PERFUSION IMAGING: CPT | Mod: 26

## 2021-08-12 PROCEDURE — 71046 X-RAY EXAM CHEST 2 VIEWS: CPT | Mod: 26

## 2021-08-16 ENCOUNTER — NON-APPOINTMENT (OUTPATIENT)
Age: 61
End: 2021-08-16

## 2021-09-23 ENCOUNTER — APPOINTMENT (OUTPATIENT)
Dept: OPHTHALMOLOGY | Facility: CLINIC | Age: 61
End: 2021-09-23
Payer: MEDICAID

## 2021-09-23 ENCOUNTER — NON-APPOINTMENT (OUTPATIENT)
Age: 61
End: 2021-09-23

## 2021-09-23 PROCEDURE — 92014 COMPRE OPH EXAM EST PT 1/>: CPT

## 2021-09-30 ENCOUNTER — APPOINTMENT (OUTPATIENT)
Dept: OTOLARYNGOLOGY | Facility: CLINIC | Age: 61
End: 2021-09-30

## 2021-10-29 ENCOUNTER — RX RENEWAL (OUTPATIENT)
Age: 61
End: 2021-10-29

## 2021-11-15 ENCOUNTER — OUTPATIENT (OUTPATIENT)
Dept: OUTPATIENT SERVICES | Facility: HOSPITAL | Age: 61
LOS: 1 days | Discharge: HOME | End: 2021-11-15

## 2021-11-15 DIAGNOSIS — D45 POLYCYTHEMIA VERA: ICD-10-CM

## 2021-11-18 ENCOUNTER — RESULT REVIEW (OUTPATIENT)
Age: 61
End: 2021-11-18

## 2021-11-18 ENCOUNTER — APPOINTMENT (OUTPATIENT)
Dept: HEMATOLOGY ONCOLOGY | Facility: CLINIC | Age: 61
End: 2021-11-18
Payer: MEDICAID

## 2021-11-18 VITALS
SYSTOLIC BLOOD PRESSURE: 124 MMHG | HEART RATE: 65 BPM | DIASTOLIC BLOOD PRESSURE: 79 MMHG | WEIGHT: 196.21 LBS | RESPIRATION RATE: 18 BRPM | OXYGEN SATURATION: 97 % | BODY MASS INDEX: 30.8 KG/M2 | HEIGHT: 66.97 IN | TEMPERATURE: 97.7 F

## 2021-11-18 DIAGNOSIS — I82.409 ACUTE EMBOLISM AND THROMBOSIS OF UNSPECIFIED DEEP VEINS OF UNSPECIFIED LOWER EXTREMITY: ICD-10-CM

## 2021-11-18 LAB
BASOPHILS # BLD AUTO: 0.06 K/UL — SIGNIFICANT CHANGE UP (ref 0–0.2)
BASOPHILS NFR BLD AUTO: 1.1 % — SIGNIFICANT CHANGE UP (ref 0–2)
EOSINOPHIL # BLD AUTO: 0.13 K/UL — SIGNIFICANT CHANGE UP (ref 0–0.5)
EOSINOPHIL NFR BLD AUTO: 2.4 % — SIGNIFICANT CHANGE UP (ref 0–6)
HCT VFR BLD CALC: 46.4 % — SIGNIFICANT CHANGE UP (ref 39–50)
HGB BLD-MCNC: 16.7 G/DL — SIGNIFICANT CHANGE UP (ref 13–17)
IMM GRANULOCYTES NFR BLD AUTO: 0.6 % — SIGNIFICANT CHANGE UP (ref 0–1.5)
LYMPHOCYTES # BLD AUTO: 2.61 K/UL — SIGNIFICANT CHANGE UP (ref 1–3.3)
LYMPHOCYTES # BLD AUTO: 48.2 % — HIGH (ref 13–44)
MCHC RBC-ENTMCNC: 30 PG — SIGNIFICANT CHANGE UP (ref 27–34)
MCHC RBC-ENTMCNC: 36 G/DL — SIGNIFICANT CHANGE UP (ref 32–36)
MCV RBC AUTO: 83.3 FL — SIGNIFICANT CHANGE UP (ref 80–100)
MONOCYTES # BLD AUTO: 0.43 K/UL — SIGNIFICANT CHANGE UP (ref 0–0.9)
MONOCYTES NFR BLD AUTO: 7.9 % — SIGNIFICANT CHANGE UP (ref 2–14)
NEUTROPHILS # BLD AUTO: 2.16 K/UL — SIGNIFICANT CHANGE UP (ref 1.8–7.4)
NEUTROPHILS NFR BLD AUTO: 39.8 % — LOW (ref 43–77)
NRBC # BLD: 0 /100 WBCS — SIGNIFICANT CHANGE UP (ref 0–0)
PLATELET # BLD AUTO: 313 K/UL — SIGNIFICANT CHANGE UP (ref 150–400)
RBC # BLD: 5.57 M/UL — SIGNIFICANT CHANGE UP (ref 4.2–5.8)
RBC # FLD: 13.5 % — SIGNIFICANT CHANGE UP (ref 10.3–14.5)
WBC # BLD: 5.42 K/UL — SIGNIFICANT CHANGE UP (ref 3.8–10.5)
WBC # FLD AUTO: 5.42 K/UL — SIGNIFICANT CHANGE UP (ref 3.8–10.5)

## 2021-11-18 PROCEDURE — 99214 OFFICE O/P EST MOD 30 MIN: CPT

## 2021-11-18 NOTE — ASSESSMENT
[FreeTextEntry1] : 62 yo M with PMHx of erythrocytosis. Patient is not able to tolerate the CPAP secondary to claustrophobia. I referred patient back to Pulmonary. \par \par Polycythemia- JAK2 is negative. Etiology of secondary polycythemia could be JOHNNIE\par \par Bone marrow biopsy normal in April 2014. \par Continue ASA 81 mg daily. \par \par 11/18/21: WBC 5.4, Hb 16.7 g/dl, hct 46.4%, MCV 83.3, PLTs 313. \par \par - HTN: Lisinopril, Amlodipine, ASA\par \par - HLD : Atorvastatin\par \par - Diabetes: on Metformin \par \par \par RTC 6 months.

## 2021-11-18 NOTE — HISTORY OF PRESENT ILLNESS
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 Coumadin was stopped after he developed hemarthrosis, however he had already finished 1.5 years of Coumadin. Currently he is only on ASA 81 mg, which he is not compliant with. When I first met him in 2014, we started off with phlebotomy every 2 weeks and then tapered as we got him to goal HCT below 45, however now it is likely that this is secondary polycythemia from JHONNIE. He was lost to follow up for a few months, but has now been more compliant. He went for a sleep study and was diagnosed with JOHNNIE and is now on CPAP at night. He wears it on and off, sometimes he is not able to tolerate it. \par  [de-identified] : Patient  feels well. no dizziness, no chest pain, no sob, no headaches, no blurry vision. he has NOT been wearing his CPAP machine. \par \par 11/18/21: WBC 5.4, Hb 16.7 g/dl, hct 46.4%, MCV 83.3, PLTs 313. \par \par No other changes in medical, surgical or social history since 10/15/2020.\par

## 2021-11-19 LAB
ALBUMIN SERPL ELPH-MCNC: 5 G/DL
ALP BLD-CCNC: 79 U/L
ALT SERPL-CCNC: 17 U/L
ANION GAP SERPL CALC-SCNC: 15 MMOL/L
AST SERPL-CCNC: 13 U/L
BILIRUB SERPL-MCNC: 0.7 MG/DL
BUN SERPL-MCNC: 9 MG/DL
CALCIUM SERPL-MCNC: 10.8 MG/DL
CHLORIDE SERPL-SCNC: 101 MMOL/L
CO2 SERPL-SCNC: 25 MMOL/L
CREAT SERPL-MCNC: 1.09 MG/DL
GLUCOSE SERPL-MCNC: 138 MG/DL
LDH SERPL-CCNC: 144 U/L
POTASSIUM SERPL-SCNC: 4.5 MMOL/L
PROT SERPL-MCNC: 8 G/DL
SODIUM SERPL-SCNC: 140 MMOL/L

## 2022-01-03 ENCOUNTER — APPOINTMENT (OUTPATIENT)
Dept: OPHTHALMOLOGY | Facility: CLINIC | Age: 62
End: 2022-01-03

## 2022-01-10 NOTE — ED PROVIDER NOTE - NEUROLOGICAL POSTURING
Detail Level: Detailed
Depth Of Biopsy: dermis
Was A Bandage Applied: Yes
Size Of Lesion In Cm: 0.4
X Size Of Lesion In Cm: 0
Biopsy Type: H and E
Biopsy Method: Dermablade
Anesthesia Type: 1% lidocaine with epinephrine
Anesthesia Volume In Cc: 0.5
Hemostasis: Drysol
Wound Care: Petrolatum
Dressing: bandage
Destruction After The Procedure: No
Type Of Destruction Used: Curettage
Curettage Text: The wound bed was treated with curettage after the biopsy was performed.
Cryotherapy Text: The wound bed was treated with cryotherapy after the biopsy was performed.
Electrodesiccation Text: The wound bed was treated with electrodesiccation after the biopsy was performed.
Electrodesiccation And Curettage Text: The wound bed was treated with electrodesiccation and curettage after the biopsy was performed.
Silver Nitrate Text: The wound bed was treated with silver nitrate after the biopsy was performed.
Lab: -[id number]
Consent: Written consent was obtained and risks were reviewed including but not limited to scarring, infection, bleeding, scabbing, incomplete removal, nerve damage and allergy to anesthesia.
Post-Care Instructions: I reviewed with the patient in detail post-care instructions. Patient is to keep the biopsy site dry overnight, and then apply bacitracin twice daily until healed. Patient may apply hydrogen peroxide soaks to remove any crusting.
Notification Instructions: Patient will be notified of biopsy results. However, patient instructed to call the office if not contacted within 2 weeks.
Billing Type: Third-Party Bill
Information: Selecting Yes will display possible errors in your note based on the variables you have selected. This validation is only offered as a suggestion for you. PLEASE NOTE THAT THE VALIDATION TEXT WILL BE REMOVED WHEN YOU FINALIZE YOUR NOTE. IF YOU WANT TO FAX A PRELIMINARY NOTE YOU WILL NEED TO TOGGLE THIS TO 'NO' IF YOU DO NOT WANT IT IN YOUR FAXED NOTE.
normal

## 2022-01-12 ENCOUNTER — APPOINTMENT (OUTPATIENT)
Dept: INTERNAL MEDICINE | Facility: CLINIC | Age: 62
End: 2022-01-12

## 2022-01-14 ENCOUNTER — APPOINTMENT (OUTPATIENT)
Dept: INTERNAL MEDICINE | Facility: CLINIC | Age: 62
End: 2022-01-14
Payer: MEDICAID

## 2022-01-14 ENCOUNTER — OUTPATIENT (OUTPATIENT)
Dept: OUTPATIENT SERVICES | Facility: HOSPITAL | Age: 62
LOS: 1 days | End: 2022-01-14

## 2022-01-14 ENCOUNTER — RESULT REVIEW (OUTPATIENT)
Age: 62
End: 2022-01-14

## 2022-01-14 VITALS
BODY MASS INDEX: 31.82 KG/M2 | HEIGHT: 66 IN | SYSTOLIC BLOOD PRESSURE: 120 MMHG | DIASTOLIC BLOOD PRESSURE: 86 MMHG | HEART RATE: 67 BPM | OXYGEN SATURATION: 96 % | WEIGHT: 198 LBS

## 2022-01-14 DIAGNOSIS — E78.5 HYPERLIPIDEMIA, UNSPECIFIED: ICD-10-CM

## 2022-01-14 DIAGNOSIS — I10 ESSENTIAL (PRIMARY) HYPERTENSION: ICD-10-CM

## 2022-01-14 DIAGNOSIS — E66.9 OBESITY, UNSPECIFIED: ICD-10-CM

## 2022-01-14 LAB
A1C WITH ESTIMATED AVERAGE GLUCOSE RESULT: 7.3 % — HIGH (ref 4–5.6)
ANION GAP SERPL CALC-SCNC: 10 MMOL/L — SIGNIFICANT CHANGE UP (ref 7–14)
BASOPHILS # BLD AUTO: 0.05 K/UL — SIGNIFICANT CHANGE UP (ref 0–0.2)
BASOPHILS NFR BLD AUTO: 0.8 % — SIGNIFICANT CHANGE UP (ref 0–2)
BUN SERPL-MCNC: 12 MG/DL — SIGNIFICANT CHANGE UP (ref 7–23)
CALCIUM SERPL-MCNC: 10.5 MG/DL — SIGNIFICANT CHANGE UP (ref 8.4–10.5)
CHLORIDE SERPL-SCNC: 98 MMOL/L — SIGNIFICANT CHANGE UP (ref 98–107)
CO2 SERPL-SCNC: 29 MMOL/L — SIGNIFICANT CHANGE UP (ref 22–31)
CREAT SERPL-MCNC: 0.99 MG/DL — SIGNIFICANT CHANGE UP (ref 0.5–1.3)
EOSINOPHIL # BLD AUTO: 0.13 K/UL — SIGNIFICANT CHANGE UP (ref 0–0.5)
EOSINOPHIL NFR BLD AUTO: 2.1 % — SIGNIFICANT CHANGE UP (ref 0–6)
ESTIMATED AVERAGE GLUCOSE: 163 — SIGNIFICANT CHANGE UP
GLUCOSE SERPL-MCNC: 132 MG/DL — HIGH (ref 70–99)
HCT VFR BLD CALC: 49.6 % — SIGNIFICANT CHANGE UP (ref 39–50)
HGB BLD-MCNC: 17.8 G/DL — HIGH (ref 13–17)
IANC: 2.64 K/UL — SIGNIFICANT CHANGE UP (ref 1.5–8.5)
IMM GRANULOCYTES NFR BLD AUTO: 0.2 % — SIGNIFICANT CHANGE UP (ref 0–1.5)
LYMPHOCYTES # BLD AUTO: 2.89 K/UL — SIGNIFICANT CHANGE UP (ref 1–3.3)
LYMPHOCYTES # BLD AUTO: 45.9 % — HIGH (ref 13–44)
MCHC RBC-ENTMCNC: 29.7 PG — SIGNIFICANT CHANGE UP (ref 27–34)
MCHC RBC-ENTMCNC: 35.9 GM/DL — SIGNIFICANT CHANGE UP (ref 32–36)
MCV RBC AUTO: 82.8 FL — SIGNIFICANT CHANGE UP (ref 80–100)
MONOCYTES # BLD AUTO: 0.57 K/UL — SIGNIFICANT CHANGE UP (ref 0–0.9)
MONOCYTES NFR BLD AUTO: 9.1 % — SIGNIFICANT CHANGE UP (ref 2–14)
NEUTROPHILS # BLD AUTO: 2.64 K/UL — SIGNIFICANT CHANGE UP (ref 1.8–7.4)
NEUTROPHILS NFR BLD AUTO: 41.9 % — LOW (ref 43–77)
NRBC # BLD: 0 /100 WBCS — SIGNIFICANT CHANGE UP
NRBC # FLD: 0 K/UL — SIGNIFICANT CHANGE UP
PLATELET # BLD AUTO: 334 K/UL — SIGNIFICANT CHANGE UP (ref 150–400)
POTASSIUM SERPL-MCNC: 4.5 MMOL/L — SIGNIFICANT CHANGE UP (ref 3.5–5.3)
POTASSIUM SERPL-SCNC: 4.5 MMOL/L — SIGNIFICANT CHANGE UP (ref 3.5–5.3)
RBC # BLD: 5.99 M/UL — HIGH (ref 4.2–5.8)
RBC # FLD: 13.2 % — SIGNIFICANT CHANGE UP (ref 10.3–14.5)
SODIUM SERPL-SCNC: 137 MMOL/L — SIGNIFICANT CHANGE UP (ref 135–145)
WBC # BLD: 6.29 K/UL — SIGNIFICANT CHANGE UP (ref 3.8–10.5)
WBC # FLD AUTO: 6.29 K/UL — SIGNIFICANT CHANGE UP (ref 3.8–10.5)

## 2022-01-14 PROCEDURE — 99214 OFFICE O/P EST MOD 30 MIN: CPT | Mod: GC

## 2022-01-14 NOTE — ASSESSMENT
[FreeTextEntry1] : 61M w/ PMH HTN, HLD, DM2, JOHNNIE on CPAP w/ poor adherence, secondary polycythemia c/b splenic and renal thrombosis (was on coumadin but DC'd due to hemarthrosis) and hearing loss who comes to clinic for f/u of chronic conditions. \par \par #JOHNNIE\par - daytime sleepiness, snoring, apneic episodes\par - sleep study in 2015\par - follows w/ pulm; non-adherent w/ CPAP \par - pulm f/u for continued mgmt \par \par #Polycythemia\par - possibly secondary to JOHNNIE\par - JAK2 neg\par - 11/21 Hb/hct 16.7/46\par - c/w ASA\par - continue to f/u w/ hematology\par \par #Hearing loss\par - bilateral\par - audiology referral \par \par #HTN\par - controlled on amlo 10, lisinopril 10\par - c/w current regmen \par - BP checks encouraged\par \par #DM2\par - on metformin 100) BID but takes daily; BID use encouraged\par - BG checks encouraged\par - Recent A1c 07/21 6.7 \par - A1c, BMP, microalbumin/Cr \par - Consider additoin of SGLT2 drug in future\par \par #HLD\par - c/w atorvastatin \par \par Earnest Good PGY1\par D/w Dr. Maxwell

## 2022-01-14 NOTE — HISTORY OF PRESENT ILLNESS
[FreeTextEntry1] : f/u for chronic conditions  [de-identified] : 61M w/ PMH HTN, HLD, DM2, JOHNNIE on CPAP w/ poor adherence, secondary polycythemia c/b splenic and renal thrombosis (was on coumadin but DC'd due to hemarthrosis) and hearing loss who comes to clinic for f/u of chronic conditions. \par \par Overall patient feeling well. Reporting no new complaints. \par \par JOHNNIE. Continued daytime sleepiness, snoring at night, observed apneic episodes. Non-adherent w/ CPAP. Says it is "stifling" and "taking away life more than it is preserving life." Adherence encouraged. Follows w/ pulm. Recent recs: Auto CPAP 4-20 cm w/ heated humidification. Denies SOB, cough, leg swelling. \par \par Polycythemia. JAK2 negative. Possibly secondary to JOHNNIE. Multiple sessions of phlebotomy in 2014. 11/21 Hb/Hct 16.7/46.4. Follows w/ hematology. On ASA. Last seen in November. No additional interventions. Denies CP, abd pain, HA, vision changes, weakness. \par \par Hearing loss. Remains stable. Has not seen audiology. No dizziness, HA, n/v, tinnitus. \par \par HTN. On amlo 10, lisinopril 10. Does not monitor at home. \par \par DM2. Prescribed metformin 1000 BID but only takes daily. Does not check BG at home. Recent A1c 07/21 6.7  \par \par \par \par

## 2022-01-14 NOTE — REVIEW OF SYSTEMS
[Fatigue] : fatigue [Hearing Loss] : hearing loss [Negative] : Heme/Lymph [Fever] : no fever [Chills] : no chills [Hot Flashes] : no hot flashes [Night Sweats] : no night sweats [Recent Change In Weight] : ~T no recent weight change [Discharge] : no discharge [Pain] : no pain [Redness] : no redness [Dryness] : no dryness [Vision Problems] : no vision problems [Itching] : no itching [Earache] : no earache [Nosebleeds] : no nosebleeds [Postnasal Drip] : no postnasal drip [Nasal Discharge] : no nasal discharge [Sore Throat] : no sore throat [Hoarseness] : no hoarseness [Palpitations] : no palpitations [Claudication] : no  leg claudication [Lower Ext Edema] : no lower extremity edema [Orthopena] : no orthopnea [Paroxysmal Nocturnal Dyspnea] : no paroxysmal nocturnal dyspnea [Shortness Of Breath] : no shortness of breath [Wheezing] : no wheezing [Cough] : no cough [Dyspnea on Exertion] : not dyspnea on exertion

## 2022-01-15 LAB
CREAT ?TM UR-MCNC: 77 MG/DL — SIGNIFICANT CHANGE UP
MICROALBUMIN UR-MCNC: <1.2 MG/DL — SIGNIFICANT CHANGE UP
MICROALBUMIN/CREAT UR-RTO: SIGNIFICANT CHANGE UP MG/G (ref 0–30)

## 2022-01-26 ENCOUNTER — APPOINTMENT (OUTPATIENT)
Dept: PULMONOLOGY | Facility: CLINIC | Age: 62
End: 2022-01-26
Payer: MEDICAID

## 2022-01-26 ENCOUNTER — NON-APPOINTMENT (OUTPATIENT)
Age: 62
End: 2022-01-26

## 2022-01-26 VITALS
HEIGHT: 66 IN | RESPIRATION RATE: 18 BRPM | OXYGEN SATURATION: 93 % | TEMPERATURE: 97.9 F | SYSTOLIC BLOOD PRESSURE: 127 MMHG | HEART RATE: 76 BPM | BODY MASS INDEX: 32.47 KG/M2 | WEIGHT: 202 LBS | DIASTOLIC BLOOD PRESSURE: 82 MMHG

## 2022-01-26 DIAGNOSIS — G47.12: ICD-10-CM

## 2022-01-26 PROCEDURE — 99214 OFFICE O/P EST MOD 30 MIN: CPT

## 2022-01-27 NOTE — ASSESSMENT
[FreeTextEntry1] : Mr. Arias is a 61-year-old male with a history of HTN, HLD, DM2, mild JOHNNIE on CPAP (RDI 10/hr), and secondary polycythemia who presents for follow-up of his uncontrolled JOHNNIE. He was diagnosed with JOHNNIE in 2015, AHI 2.8 but RDI 10/hr. CPAP titration in October 2015 revealed optimal setting of CPAP 8 cm H2O. MSLT showed hypersomnolence with MSOL of 5.5 min, 1 SOREMP. He was recommended APAP 4-20 cm H2O last visit in August 2021, but he has not yet received machine due to recent recall. Of note, initial diagnostic PSG from June 2014 with T-90% of 47.9% and T-85% of <1%. Subsequent diagnostic PSG from September 2015 with T-90% of 13%, with no time less than 85%.\par \par 1. JOHNNIE with EDS:\par - Start Auto CPAP 4 - 20 cm H2O, order changed to [place] for ResVent PAP\par - Mandatory heated humidification \par - Use with nasal pillows or nasal mask\par - Patient counseled regarding adherence with therapy, especially given secondary polycythemia likely due to chronic nocturnal hypoxemia\par - If EDS persists despite CPAP therapy, then may require modafinil therapy\par - Consider sleep evaluation, not a candidate for INSPIRE given AHI<20\par - May need nocturnal home oxygen or dental eval for mandibular advancement oral appliance therapy if again does not tolerate CPAP\par - I asked him to go for repeat diagnostic PSG/CPAP titration (last study in 2015). Once he is tolerating APAP therapy, will consider overnight pulse oximetry to assess for resolution of nocturnal hypoxemia with treatment of JOHNNIE\par \par 2. Nocturnal hypoxemia with secondary polycythemia:\par - Exact etiology unclear as nocturnal hypoxemia is not entirely associated with sleep-disordered breathing events\par - Diagnostic PSG (Sept 2015) with no evidence of hypoventilation or hypercapnia (transcutaneous CO2 41 mm Hg)\par - ABG on room air (July 2019) with pH 7.40, pCO2 42, paO2 72, calculated HCO3 25, SaO2 95%\par - Has elevated A-a gradient of 25\par - 2D-echo from October 2019 with normal left and right ventricular size and systolic function. Normal LA. No significant valvular disease. 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 V/Q scan in August 2021 with no evidence of PE\par - Previous hypercoagulable workup negative, including lupus anticoagulant , prothrombin gene mutation, Factor V Leiden, PNH and MGMTHFR\par - Hematology workup for his polycythemia, including bone marrow biopsy, has been negative with normocellular bone marrow with erythroid predominant lineage and iron stores absent. JAK2 testing in 2012 and 2013 negative, HGB was as high as 20 at that time\par - Erythropoietin level in Oct 2019 6.3, which is normal\par - If remains unable to tolerate CPAP therapy, then consider initiating nocturnal oxygen via NC to treat his chronic hypoxemia\par \par 3. HCM:\par - Up to date with COVID-19 vaccinations, needs COVID-19 booster\par - Declining influenza vaccine\par - Follow-up after 1 month of adherence with APAP

## 2022-01-27 NOTE — HISTORY OF PRESENT ILLNESS
[FreeTextEntry1] : Mr. Arias is a 61-year-old male with a history of HTN, HLD, DM2, mild JOHNNIE (not on CPAP), and secondary polycythemia who presents for follow-up of his uncontrolled JOHNNIE. He was last seen in August 2021 and was counseled regarding importance of adherence with Auto CPAP, was supposed to start Auto CPAP 4-20 cm H2O with mandatory heated humidification using nasal pillows. Unfortunately he never received a CPAP device and has not initiated therapy. He is not a candidate for INSPIRE given AHI<20. If he has repeat difficulty tolerating CPAP, will consider referral to dental for mandibular advancement oral appliance therapy. \par \par He goes to sleep at around 11 pm (after online [employer] from 8-10:30). He wakes up at 530-6am, but can go back to sleep after. He sleeps throughout the day if he is comfortable. He falls asleep within 4-5 minutes. He takes naps at home of 1-2 hours. He reports EDS and continues to wake up unrefreshed with nonrestorative sleep. He reports occasional episodes where he falls asleep when stopped at a traffic light. Can fall asleep very easily watching TV or reading a book. No morning headache, but wakes up with a dry mouth. Was told that he snores. He reports an episode 2 weeks ago when he woke up gasping/choking and could not catch his breath. His HGB remains elevated at >17. He has lost 9 lbs since last visit. ESS is 24. He denies any chest pain, dyspnea, wheezing, or cough.\par \par Last CPAP titration study in October 2015 with recommended CPAP pressure of 8 cm H2O. He was wearing a [employer] Simplus medium mask with heated humidification. However, mask was causing claustrophobia and he was recently switched to an Eson 2 medium nasal mask. He reports that the CPAP stifles him and he has excessively dry mouth. He has not used the CPAP in months because of the dry mouth and throat. He has an APEX CPAP with no humidification, but will need a new machine.\par \par He was diagnosed with JOHNNIE in 2015, AHI 2.8 but RDI 10/hr. He then had a CPAP titration in October 2015 revealing optimal setting of CPAP 8 cm H2O. MSLT showed hypersomnolence with MSOL of 5.5 min, 1 SOREMP. Unfortunately, he does not wear CPAP regularly. Of note, initial diagnostic PSG from June 2014 with T-90% of 47.9% and T-85% of <1%. Subsequent diagnostic PSG from September 2015 with T-90% of 13%, with no time less than 85%.\par \par He has a history of secondary polycythemia for several years, previously requiring phlebotomy. He also has a history of right renal infract and splenic infarctions in 2012 for which he was previously on warfarin. His hypecoagulable workup was negative, including lupus anticoagulant , prothrombin gene mutation, Factor V Leiden , PNH and MGMTHFR. A KASIA had previously shown an aortic arch/descending aorta atheroma. Hematology workup for his polycythemia, including bone marrow biopsy, has been negative with normocellular bone marrow with erythroid predominant lineage and iron stores were absent. JAK2 testing in 2012 and 2013 negative, HGB was as high as 20.

## 2022-02-28 RX ORDER — METFORMIN HYDROCHLORIDE 1000 MG/1
1000 TABLET, FILM COATED, EXTENDED RELEASE ORAL
Qty: 30 | Refills: 3 | Status: DISCONTINUED | COMMUNITY
Start: 2022-02-28 | End: 2022-02-28

## 2022-03-07 ENCOUNTER — APPOINTMENT (OUTPATIENT)
Dept: OTOLARYNGOLOGY | Facility: CLINIC | Age: 62
End: 2022-03-07
Payer: MEDICAID

## 2022-03-07 VITALS
DIASTOLIC BLOOD PRESSURE: 83 MMHG | HEART RATE: 77 BPM | SYSTOLIC BLOOD PRESSURE: 142 MMHG | HEIGHT: 67 IN | BODY MASS INDEX: 31.23 KG/M2 | WEIGHT: 199 LBS

## 2022-03-07 PROCEDURE — 92557 COMPREHENSIVE HEARING TEST: CPT

## 2022-03-07 PROCEDURE — 99203 OFFICE O/P NEW LOW 30 MIN: CPT

## 2022-03-07 PROCEDURE — 92567 TYMPANOMETRY: CPT

## 2022-03-20 ENCOUNTER — FORM ENCOUNTER (OUTPATIENT)
Age: 62
End: 2022-03-20

## 2022-03-21 ENCOUNTER — APPOINTMENT (OUTPATIENT)
Dept: SLEEP CENTER | Facility: CLINIC | Age: 62
End: 2022-03-21
Payer: MEDICAID

## 2022-03-21 ENCOUNTER — OUTPATIENT (OUTPATIENT)
Dept: OUTPATIENT SERVICES | Facility: HOSPITAL | Age: 62
LOS: 1 days | End: 2022-03-21
Payer: MEDICAID

## 2022-03-21 PROCEDURE — 95810 POLYSOM 6/> YRS 4/> PARAM: CPT

## 2022-03-21 PROCEDURE — 95810 POLYSOM 6/> YRS 4/> PARAM: CPT | Mod: 26

## 2022-03-24 DIAGNOSIS — G47.33 OBSTRUCTIVE SLEEP APNEA (ADULT) (PEDIATRIC): ICD-10-CM

## 2022-04-01 ENCOUNTER — APPOINTMENT (OUTPATIENT)
Dept: PHARMACY | Facility: CLINIC | Age: 62
End: 2022-04-01

## 2022-04-04 ENCOUNTER — APPOINTMENT (OUTPATIENT)
Dept: MRI IMAGING | Facility: IMAGING CENTER | Age: 62
End: 2022-04-04

## 2022-04-20 NOTE — PHYSICAL EXAM
[Midline] : trachea located in midline position [Normal] : no rashes [de-identified] : Obstructing cerumen removed AU using alligator & curette  - tolerated well - TMs normal post-removal.

## 2022-04-20 NOTE — DATA REVIEWED
[de-identified] : RE: Mild to essentially a moderately severe SNHL.\par LE: Milld SNHL at 250 Hz, rising to hearing WNL at 500 Hz, sloping to moderate to severe SNHL through 4 KHz, and a severe HL at 6 KHz, rising to a moderately severe HL at 8 KHz.\par Type A Tymp, Au.

## 2022-04-20 NOTE — HISTORY OF PRESENT ILLNESS
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

## 2022-04-29 ENCOUNTER — NON-APPOINTMENT (OUTPATIENT)
Age: 62
End: 2022-04-29

## 2022-05-09 ENCOUNTER — APPOINTMENT (OUTPATIENT)
Dept: PHARMACY | Facility: CLINIC | Age: 62
End: 2022-05-09
Payer: MEDICAID

## 2022-05-09 PROCEDURE — V5261F: CUSTOM

## 2022-06-13 ENCOUNTER — APPOINTMENT (OUTPATIENT)
Dept: PHARMACY | Facility: CLINIC | Age: 62
End: 2022-06-13

## 2022-07-06 ENCOUNTER — OUTPATIENT (OUTPATIENT)
Dept: OUTPATIENT SERVICES | Facility: HOSPITAL | Age: 62
LOS: 1 days | Discharge: HOME | End: 2022-07-06

## 2022-07-06 DIAGNOSIS — D45 POLYCYTHEMIA VERA: ICD-10-CM

## 2022-07-19 ENCOUNTER — APPOINTMENT (OUTPATIENT)
Dept: HEMATOLOGY ONCOLOGY | Facility: CLINIC | Age: 62
End: 2022-07-19

## 2022-07-19 NOTE — HISTORY OF PRESENT ILLNESS
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 Coumadin was stopped after he developed hemarthrosis, however he had already finished 1.5 years of Coumadin. Currently he is only on ASA 81 mg, which he is not compliant with. When I first met him in 2014, we started off with phlebotomy every 2 weeks and then tapered as we got him to goal HCT below 45, however now it is likely that this is secondary polycythemia from JOHNNIE. He was lost to follow up for a few months, but has now been more compliant. He went for a sleep study and was diagnosed with JOHNNIE and is now on CPAP at night. He wears it on and off, sometimes he is not able to tolerate it. \par  [de-identified] : Patient presents here today for a follow up. He is doing well. Denies any dizziness, no chest pain, no sob, no headaches, no blurry vision. Has been using his CPAP machine. Continues on daily ASA 81 mg? COVID 19 vaccine____. \par \par No other changes in medical, surgical or social history since 11/18/2021.\par

## 2022-08-23 ENCOUNTER — INPATIENT (INPATIENT)
Facility: HOSPITAL | Age: 62
LOS: 6 days | Discharge: HOME | End: 2022-08-30
Attending: INTERNAL MEDICINE | Admitting: INTERNAL MEDICINE

## 2022-08-23 VITALS
HEART RATE: 82 BPM | OXYGEN SATURATION: 97 % | TEMPERATURE: 97 F | SYSTOLIC BLOOD PRESSURE: 145 MMHG | DIASTOLIC BLOOD PRESSURE: 85 MMHG | RESPIRATION RATE: 16 BRPM | HEIGHT: 67 IN

## 2022-08-23 DIAGNOSIS — E78.5 HYPERLIPIDEMIA, UNSPECIFIED: ICD-10-CM

## 2022-08-23 DIAGNOSIS — G47.33 OBSTRUCTIVE SLEEP APNEA (ADULT) (PEDIATRIC): ICD-10-CM

## 2022-08-23 DIAGNOSIS — E11.9 TYPE 2 DIABETES MELLITUS WITHOUT COMPLICATIONS: ICD-10-CM

## 2022-08-23 DIAGNOSIS — Z29.9 ENCOUNTER FOR PROPHYLACTIC MEASURES, UNSPECIFIED: ICD-10-CM

## 2022-08-23 DIAGNOSIS — I10 ESSENTIAL (PRIMARY) HYPERTENSION: ICD-10-CM

## 2022-08-23 DIAGNOSIS — M79.2 NEURALGIA AND NEURITIS, UNSPECIFIED: ICD-10-CM

## 2022-08-23 DIAGNOSIS — R07.9 CHEST PAIN, UNSPECIFIED: ICD-10-CM

## 2022-08-23 DIAGNOSIS — Z86.2 PERSONAL HISTORY OF DISEASES OF THE BLOOD AND BLOOD-FORMING ORGANS AND CERTAIN DISORDERS INVOLVING THE IMMUNE MECHANISM: ICD-10-CM

## 2022-08-23 LAB
ALBUMIN SERPL ELPH-MCNC: 4.8 G/DL — SIGNIFICANT CHANGE UP (ref 3.3–5)
ALP SERPL-CCNC: 75 U/L — SIGNIFICANT CHANGE UP (ref 40–120)
ALT FLD-CCNC: 29 U/L — SIGNIFICANT CHANGE UP (ref 4–41)
ANION GAP SERPL CALC-SCNC: 11 MMOL/L — SIGNIFICANT CHANGE UP (ref 7–14)
AST SERPL-CCNC: 16 U/L — SIGNIFICANT CHANGE UP (ref 4–40)
BASOPHILS # BLD AUTO: 0.06 K/UL — SIGNIFICANT CHANGE UP (ref 0–0.2)
BASOPHILS NFR BLD AUTO: 1 % — SIGNIFICANT CHANGE UP (ref 0–2)
BILIRUB SERPL-MCNC: 0.5 MG/DL — SIGNIFICANT CHANGE UP (ref 0.2–1.2)
BUN SERPL-MCNC: 9 MG/DL — SIGNIFICANT CHANGE UP (ref 7–23)
CALCIUM SERPL-MCNC: 10.2 MG/DL — SIGNIFICANT CHANGE UP (ref 8.4–10.5)
CHLORIDE SERPL-SCNC: 103 MMOL/L — SIGNIFICANT CHANGE UP (ref 98–107)
CO2 SERPL-SCNC: 25 MMOL/L — SIGNIFICANT CHANGE UP (ref 22–31)
CREAT SERPL-MCNC: 1.05 MG/DL — SIGNIFICANT CHANGE UP (ref 0.5–1.3)
EGFR: 81 ML/MIN/1.73M2 — SIGNIFICANT CHANGE UP
EOSINOPHIL # BLD AUTO: 0.13 K/UL — SIGNIFICANT CHANGE UP (ref 0–0.5)
EOSINOPHIL NFR BLD AUTO: 2.1 % — SIGNIFICANT CHANGE UP (ref 0–6)
FLUAV AG NPH QL: SIGNIFICANT CHANGE UP
FLUBV AG NPH QL: SIGNIFICANT CHANGE UP
GLUCOSE BLDC GLUCOMTR-MCNC: 118 MG/DL — HIGH (ref 70–99)
GLUCOSE BLDC GLUCOMTR-MCNC: 149 MG/DL — HIGH (ref 70–99)
GLUCOSE SERPL-MCNC: 186 MG/DL — HIGH (ref 70–99)
HCT VFR BLD CALC: 45.4 % — SIGNIFICANT CHANGE UP (ref 39–50)
HGB BLD-MCNC: 16.7 G/DL — SIGNIFICANT CHANGE UP (ref 13–17)
IANC: 2.59 K/UL — SIGNIFICANT CHANGE UP (ref 1.8–7.4)
IMM GRANULOCYTES NFR BLD AUTO: 0.5 % — SIGNIFICANT CHANGE UP (ref 0–1.5)
LYMPHOCYTES # BLD AUTO: 2.85 K/UL — SIGNIFICANT CHANGE UP (ref 1–3.3)
LYMPHOCYTES # BLD AUTO: 45.7 % — HIGH (ref 13–44)
MAGNESIUM SERPL-MCNC: 2.1 MG/DL — SIGNIFICANT CHANGE UP (ref 1.6–2.6)
MCHC RBC-ENTMCNC: 30.2 PG — SIGNIFICANT CHANGE UP (ref 27–34)
MCHC RBC-ENTMCNC: 36.8 GM/DL — HIGH (ref 32–36)
MCV RBC AUTO: 82.1 FL — SIGNIFICANT CHANGE UP (ref 80–100)
MONOCYTES # BLD AUTO: 0.57 K/UL — SIGNIFICANT CHANGE UP (ref 0–0.9)
MONOCYTES NFR BLD AUTO: 9.1 % — SIGNIFICANT CHANGE UP (ref 2–14)
NEUTROPHILS # BLD AUTO: 2.59 K/UL — SIGNIFICANT CHANGE UP (ref 1.8–7.4)
NEUTROPHILS NFR BLD AUTO: 41.6 % — LOW (ref 43–77)
NRBC # BLD: 0 /100 WBCS — SIGNIFICANT CHANGE UP (ref 0–0)
NRBC # FLD: 0 K/UL — SIGNIFICANT CHANGE UP (ref 0–0)
NT-PROBNP SERPL-SCNC: <5 PG/ML — SIGNIFICANT CHANGE UP
PHOSPHATE SERPL-MCNC: 3.5 MG/DL — SIGNIFICANT CHANGE UP (ref 2.5–4.5)
PLATELET # BLD AUTO: 296 K/UL — SIGNIFICANT CHANGE UP (ref 150–400)
POTASSIUM SERPL-MCNC: 4.3 MMOL/L — SIGNIFICANT CHANGE UP (ref 3.5–5.3)
POTASSIUM SERPL-SCNC: 4.3 MMOL/L — SIGNIFICANT CHANGE UP (ref 3.5–5.3)
PROT SERPL-MCNC: 7.6 G/DL — SIGNIFICANT CHANGE UP (ref 6–8.3)
RBC # BLD: 5.53 M/UL — SIGNIFICANT CHANGE UP (ref 4.2–5.8)
RBC # FLD: 13.4 % — SIGNIFICANT CHANGE UP (ref 10.3–14.5)
RSV RNA NPH QL NAA+NON-PROBE: SIGNIFICANT CHANGE UP
SARS-COV-2 RNA SPEC QL NAA+PROBE: SIGNIFICANT CHANGE UP
SODIUM SERPL-SCNC: 139 MMOL/L — SIGNIFICANT CHANGE UP (ref 135–145)
TROPONIN T, HIGH SENSITIVITY RESULT: 6 NG/L — SIGNIFICANT CHANGE UP
TSH SERPL-MCNC: 2.32 UIU/ML — SIGNIFICANT CHANGE UP (ref 0.27–4.2)
WBC # BLD: 6.23 K/UL — SIGNIFICANT CHANGE UP (ref 3.8–10.5)
WBC # FLD AUTO: 6.23 K/UL — SIGNIFICANT CHANGE UP (ref 3.8–10.5)

## 2022-08-23 PROCEDURE — 71046 X-RAY EXAM CHEST 2 VIEWS: CPT | Mod: 26

## 2022-08-23 PROCEDURE — 72125 CT NECK SPINE W/O DYE: CPT | Mod: 26,MA

## 2022-08-23 PROCEDURE — 99285 EMERGENCY DEPT VISIT HI MDM: CPT

## 2022-08-23 PROCEDURE — 70450 CT HEAD/BRAIN W/O DYE: CPT | Mod: 26,MA

## 2022-08-23 RX ORDER — ASPIRIN/CALCIUM CARB/MAGNESIUM 324 MG
162 TABLET ORAL ONCE
Refills: 0 | Status: COMPLETED | OUTPATIENT
Start: 2022-08-23 | End: 2022-08-23

## 2022-08-23 RX ORDER — ASPIRIN/CALCIUM CARB/MAGNESIUM 324 MG
81 TABLET ORAL DAILY
Refills: 0 | Status: DISCONTINUED | OUTPATIENT
Start: 2022-08-23 | End: 2022-08-30

## 2022-08-23 RX ORDER — SODIUM CHLORIDE 9 MG/ML
1000 INJECTION, SOLUTION INTRAVENOUS
Refills: 0 | Status: DISCONTINUED | OUTPATIENT
Start: 2022-08-23 | End: 2022-08-30

## 2022-08-23 RX ORDER — ACETAMINOPHEN 500 MG
650 TABLET ORAL EVERY 6 HOURS
Refills: 0 | Status: DISCONTINUED | OUTPATIENT
Start: 2022-08-23 | End: 2022-08-25

## 2022-08-23 RX ORDER — LISINOPRIL 2.5 MG/1
1 TABLET ORAL
Qty: 0 | Refills: 0 | DISCHARGE

## 2022-08-23 RX ORDER — ATORVASTATIN CALCIUM 80 MG/1
1 TABLET, FILM COATED ORAL
Qty: 0 | Refills: 0 | DISCHARGE

## 2022-08-23 RX ORDER — INSULIN LISPRO 100/ML
VIAL (ML) SUBCUTANEOUS
Refills: 0 | Status: DISCONTINUED | OUTPATIENT
Start: 2022-08-23 | End: 2022-08-30

## 2022-08-23 RX ORDER — ATORVASTATIN CALCIUM 80 MG/1
20 TABLET, FILM COATED ORAL AT BEDTIME
Refills: 0 | Status: DISCONTINUED | OUTPATIENT
Start: 2022-08-23 | End: 2022-08-30

## 2022-08-23 RX ORDER — KETOROLAC TROMETHAMINE 30 MG/ML
15 SYRINGE (ML) INJECTION ONCE
Refills: 0 | Status: DISCONTINUED | OUTPATIENT
Start: 2022-08-23 | End: 2022-08-23

## 2022-08-23 RX ORDER — LISINOPRIL 2.5 MG/1
10 TABLET ORAL DAILY
Refills: 0 | Status: DISCONTINUED | OUTPATIENT
Start: 2022-08-23 | End: 2022-08-30

## 2022-08-23 RX ORDER — LANOLIN ALCOHOL/MO/W.PET/CERES
3 CREAM (GRAM) TOPICAL AT BEDTIME
Refills: 0 | Status: DISCONTINUED | OUTPATIENT
Start: 2022-08-23 | End: 2022-08-30

## 2022-08-23 RX ORDER — AMLODIPINE BESYLATE 2.5 MG/1
10 TABLET ORAL DAILY
Refills: 0 | Status: DISCONTINUED | OUTPATIENT
Start: 2022-08-23 | End: 2022-08-30

## 2022-08-23 RX ORDER — GLUCAGON INJECTION, SOLUTION 0.5 MG/.1ML
1 INJECTION, SOLUTION SUBCUTANEOUS ONCE
Refills: 0 | Status: DISCONTINUED | OUTPATIENT
Start: 2022-08-23 | End: 2022-08-30

## 2022-08-23 RX ORDER — GABAPENTIN 400 MG/1
100 CAPSULE ORAL AT BEDTIME
Refills: 0 | Status: DISCONTINUED | OUTPATIENT
Start: 2022-08-23 | End: 2022-08-25

## 2022-08-23 RX ORDER — DEXTROSE 50 % IN WATER 50 %
15 SYRINGE (ML) INTRAVENOUS ONCE
Refills: 0 | Status: DISCONTINUED | OUTPATIENT
Start: 2022-08-23 | End: 2022-08-30

## 2022-08-23 RX ORDER — ENOXAPARIN SODIUM 100 MG/ML
40 INJECTION SUBCUTANEOUS EVERY 24 HOURS
Refills: 0 | Status: DISCONTINUED | OUTPATIENT
Start: 2022-08-23 | End: 2022-08-30

## 2022-08-23 RX ORDER — DEXTROSE 50 % IN WATER 50 %
25 SYRINGE (ML) INTRAVENOUS ONCE
Refills: 0 | Status: DISCONTINUED | OUTPATIENT
Start: 2022-08-23 | End: 2022-08-30

## 2022-08-23 RX ORDER — ACETAMINOPHEN 500 MG
650 TABLET ORAL ONCE
Refills: 0 | Status: COMPLETED | OUTPATIENT
Start: 2022-08-23 | End: 2022-08-23

## 2022-08-23 RX ORDER — INSULIN LISPRO 100/ML
VIAL (ML) SUBCUTANEOUS AT BEDTIME
Refills: 0 | Status: DISCONTINUED | OUTPATIENT
Start: 2022-08-23 | End: 2022-08-30

## 2022-08-23 RX ORDER — DEXTROSE 50 % IN WATER 50 %
12.5 SYRINGE (ML) INTRAVENOUS ONCE
Refills: 0 | Status: DISCONTINUED | OUTPATIENT
Start: 2022-08-23 | End: 2022-08-30

## 2022-08-23 RX ADMIN — GABAPENTIN 100 MILLIGRAM(S): 400 CAPSULE ORAL at 22:07

## 2022-08-23 RX ADMIN — Medication 15 MILLIGRAM(S): at 19:48

## 2022-08-23 RX ADMIN — Medication 162 MILLIGRAM(S): at 11:18

## 2022-08-23 RX ADMIN — ATORVASTATIN CALCIUM 20 MILLIGRAM(S): 80 TABLET, FILM COATED ORAL at 22:07

## 2022-08-23 RX ADMIN — Medication 650 MILLIGRAM(S): at 15:47

## 2022-08-23 NOTE — ED PROVIDER NOTE - NSICDXPASTMEDICALHX_GEN_ALL_CORE_FT
PAST MEDICAL HISTORY:  Diabetes     HTN (hypertension)     Hyperlipidemia     Polycythemia vera     Renal infarct     Splenic infarct

## 2022-08-23 NOTE — H&P ADULT - HISTORY OF PRESENT ILLNESS
62 y.o male pmh of JOHNNIE on CPAP, HTN, HLD, DM2, and PCV c/b splenic infarct in  presents with sided arm and chest pain. Patient states that he has been having pain that starts in the right side of his neck and radiates into this right shoulder and through his right arm to his hand, described as a shartp/shooting electric type pain that is up to 10/10 and vairable in duration, exacerbated by moving his right arm since May of 2022. Feels that his right arm is weaker than his left, and due to pain he can not lift it up well. He notes  that he occasionally has SOB during these episodes and due to the pain has not been ambulating as much.  he states he wanted to be evaluated for this sooner but his mother was very sick and was taking care of her, he recently went to [place] for her  and returned on .  Pain associated with numbness/tingling in right arm. Denies fever, chills, nausea, vomiting, palpitations, dizziness, fall, syncope, head or neck trauma, visual change, hearing change, unintentional weight loss, abd pain, diarrhea, constipation, brbpr, melena, edema, recent abx. cough.

## 2022-08-23 NOTE — H&P ADULT - NSHPLABSRESULTS_GEN_ALL_CORE
16.7   6.23  )-----------( 296      ( 23 Aug 2022 11:20 )             45.4     08-23    139  |  103  |  9   ----------------------------<  186<H>  4.3   |  25  |  1.05    Ca    10.2      23 Aug 2022 11:20  Phos  3.5     08-23  Mg     2.10     08-23    TPro  7.6  /  Alb  4.8  /  TBili  0.5  /  DBili  x   /  AST  16  /  ALT  29  /  AlkPhos  75  08-23    EKG NSR 70  Trop 6  BNP < 5    COVID Negative    BRAIN CT :Normal non-contrast CT of the brain.  CERVICAL SPINE CT: Normal exam. No acute fracture or traumatic   subluxation. RecommendMR for more complete evaluation as clinically   warranted.

## 2022-08-23 NOTE — ED PROVIDER NOTE - PHYSICAL EXAMINATION
General: well appearing, interactive, well nourished, no apparent distress, ncat  HEENT: EOMI, PERRLA, normal mucosa, normal oropharynx, no lesions on the lips or on oral mucosa, normal external ear  Neck: supple, no lymphadenopathy, full range of motion, no nuchal rigidity  CV: RRR, normal S1 and S2 with no murmur, capillary refill less than two seconds  Resp: lungs CTA b/l, good aeration bilaterally, symmetric chest wall   Abd: non-distended, soft, non-tender  : no CVA tenderness  MSK: full range of motion, no cyanosis, no edema, no clubbing, no immobility, no ml spinal ttp, r sided spurling test +  Neuro: CN2-12 grossly intact. EOMI. 5/5 strength in UE and LE b/l.  Sensation intact in UE/LE b/l b/l.  No dysdiadochokinesia. Gait nl   Skin: no rashes, skin intact

## 2022-08-23 NOTE — PROVIDER CONTACT NOTE (OTHER) - RECOMMENDATIONS
Per provider, team is thinking this is muskuloskeltal issue not cardiac. Administer toradol and reassess pain.

## 2022-08-23 NOTE — ED ADULT NURSE NOTE - NSIMPLEMENTINTERV_GEN_ALL_ED
Implemented All Universal Safety Interventions:  [place] to call system. Call bell, personal items and telephone within reach. Instruct patient to call for assistance. Room bathroom lighting operational. Non-slip footwear when patient is off stretcher. Physically safe environment: no spills, clutter or unnecessary equipment. Stretcher in lowest position, wheels locked, appropriate side rails in place.
family/patient

## 2022-08-23 NOTE — H&P ADULT - PROBLEM SELECTOR PLAN 1
- Admit to medicine on telemetry  - patient with right neck pain that radiates into right arm and chest described as electric feeling since May.   - Troponin 6 and EKG NSR without ischemic changes  - patient does not intermittent SOB that started prior to travel to [place] - not hypoxic or tachycardic   - Given multiple risk factors will check a TTE to complete work up however it is unlikely this is cardiac pain at this time. - Admit to medicine  - patient with right neck pain that radiates into right arm and chest described as electric feeling since May with significantly decreased ROM due to pain and tenderness over right trapezius muscles.   - Likely #Adhesive Capsulitis - PT ordered  - CT of head and CT of C-Spine without significant abnormality - however advised MRI will be better study to further eval.   - Start gabapentin for pain control

## 2022-08-23 NOTE — ED ADULT NURSE NOTE - OBJECTIVE STATEMENT
pt received at intake rm 10a AAO x 3. pt c/o intermittent left sided chest pain radiating to left arm since may. pt denies sob, n/v/d, fevers, chills, cough. respirations even and unlabored. 20g iv placed to right ac. labs drawn and sent. pt medicated as per MD orders. will continue to monitor.

## 2022-08-23 NOTE — PROVIDER CONTACT NOTE (OTHER) - ASSESSMENT
CHEST PAIN 9/10 right side, radiating down to R arm. pt VSS. NSR on tele. pt stating same pain since admission, no relief from tylenol.

## 2022-08-23 NOTE — H&P ADULT - ASSESSMENT
62 y.o male pmh of JOHNNIE on CPAP, HTN, HLD, DM2, and PCV c/b splenic infarct in 2012 presents with sided arm and chest pain admitted for evaluation of ACS vs cervical radiculopathy.

## 2022-08-23 NOTE — ED PROVIDER NOTE - NS ED ROS FT
GENERAL: No fever or chills, //             EYES: no change in vision, //             HEENT: no trouble swallowing or speaking, //             CARDIAC: chest pain, //              PULMONARY: no cough or SOB, //             GI: no abdominal pain, no nausea or no vomiting, no diarrhea or constipation, //             : No changes in urination,  //            SKIN: no rashes,  //            NEURO: no headache,  //             MSK: No joint pain otherwise as HPI or negative. ~George Ayon, DO

## 2022-08-23 NOTE — ED PROVIDER NOTE - CLINICAL SUMMARY MEDICAL DECISION MAKING FREE TEXT BOX
George Ayon, DO: 63 yo m pmh  HTN, HLD, DM, PCV, spleen infarct (2012), renal infarct (2012), and sleep apnea, pw cp. reports sx since may 2022 however his mother was critically ill and was tending to her. after she passed he returned to us and sought to seek care. reports r sided cp, radiates to r arm, exer, associated w/ sob. denies pleuritic sx, n/v, diaphoresis, f/c. also reports r hand numbness/weakness intermittently. denies f/c.  arrives hds, well appearing. possibly two processes occuring simultaneously. r sided ue radicular sx, and acs like sx. do not suspect pe. no hypoxia or tach. wells low, defer dimer. does not appear to be aortic catastrophe. labs, imaging, reassess.

## 2022-08-23 NOTE — H&P ADULT - NSHPSOCIALHISTORY_GEN_ALL_CORE
Lives with his sisters, previously worked as a  and also as a preacher  no etoh, recreational drug use, or etoh.

## 2022-08-23 NOTE — ED ADULT TRIAGE NOTE - CHIEF COMPLAINT QUOTE
Pt c/o intermittent right chest pain, right shoulder pain, radiating to right jaw since May. Reports occasional SOB.

## 2022-08-23 NOTE — H&P ADULT - NSHPREVIEWOFSYSTEMS_GEN_ALL_CORE
CONSTITUTIONAL: No fever; No chills; No night sweats; No weight loss; No fatigue  EYES: No eye pain; No blurry vision  ENMT:  No difficulty hearing; No sinus or throat pain  NECK: No pain or stiffness  RESPIRATORY: see above  CARDIOVASCULAR: No chest pain; No palpitations; No leg swelling  GASTROINTESTINAL: No abdominal pain; No nausea; No vomiting; No hematemesis; No diarrhea; No constipation. No melena or hematochezia.  GENITOURINARY: No dysuria; No frequency; No hematuria; No incontinence  NEUROLOGICAL: see above  SKIN: No itching/burning; No rashes or lesions   MUSCULOSKELETAL: No joint pain or swelling; No muscle, back, or extremity pain  HEME/LYMPH: No easy bruising, or bleeding gums

## 2022-08-23 NOTE — ED PROVIDER NOTE - OBJECTIVE STATEMENT
61 yo m pmh  HTN, HLD, DM, PCV, spleen infarct (2012), renal infarct (2012), and sleep apnea, pw cp. reports sx since may 2022 however his mother was critically ill and was tending to her. after she passed he returned to us and sought to seek care. reports r sided cp, radiates to r arm, exer, associated w/ sob. denies pleuritic sx, n/v, diaphoresis, f/c. also reports r hand numbness/weakness intermittently. denies f/c.

## 2022-08-23 NOTE — H&P ADULT - PROBLEM SELECTOR PLAN 2
- patient with right neck pain that radiates into right arm and chest described as electric feeling since May with significantly decreased ROM due to pain and tenderness over right trapezius muscles.   - CT of head and CT of C-Spine without significant abnormality - however advised MRI will be better study to further eval.   - Suspect Radiculopathy of C-Spine - will order MRI of C-Spine  - PT ordered - patient with right neck pain that radiates into right arm and chest described as electric feeling since May with significantly decreased ROM due to pain and tenderness over right trapezius muscles.   - CT of head and CT of C-Spine without significant abnormality - however advised MRI will be better study to further eval.   - Suspect Radiculopathy of C-Spine - will order MRI of C-Spine  - PT ordered  - Start gabapentin for pain control - patient with right neck pain that radiates into right arm and chest described as electric feeling since May.   - Troponin 6 and EKG NSR without ischemic changes  - patient does not intermittent SOB that started prior to travel to [place] - not hypoxic or tachycardic   -If patient reports persistent chest pain can consider cardiac eval but at this time pain is unlikely to be cardiac in nature and will defer.  - No need for telemetry at this time.

## 2022-08-23 NOTE — ED PROVIDER NOTE - PROGRESS NOTE DETAILS
George Ayon, : Patient reassessed, NAD, non-toxic appearing. results dw pt, questions answered. case dw Dr. Carlson. endorsed to hospitalist.

## 2022-08-23 NOTE — H&P ADULT - NSHPPHYSICALEXAM_GEN_ALL_CORE
PHYSICAL EXAM:  GENERAL: NAD, well-developed  HEAD:  Atraumatic, Normocephalic  EYES: EOMI, PERRLA, conjunctiva and sclera clear  NECK: Supple, No JVD, +TTP right paraspinal muscles  CHEST/LUNG: Clear to auscultation bilaterally; No wheeze/rhonchi/rale  HEART: Regular rate and rhythm; No murmurs, rubs, or gallops  ABDOMEN: Soft, Nontender, Nondistended; Bowel sounds present  EXTREMITIES: Decreased ROM RUE with reproduction of pain with lifting of right arm. 2+ Peripheral Pulses, No clubbing, cyanosis, or edema  PSYCH: AAOx3  NEUROLOGY: non-focal, generally 5/5 strength throughout however right UE ROM limited due to reproduction of pain.  SKIN: No rashes or lesions

## 2022-08-24 LAB
A1C WITH ESTIMATED AVERAGE GLUCOSE RESULT: 7.6 % — HIGH (ref 4–5.6)
ANION GAP SERPL CALC-SCNC: 13 MMOL/L — SIGNIFICANT CHANGE UP (ref 7–14)
BUN SERPL-MCNC: 12 MG/DL — SIGNIFICANT CHANGE UP (ref 7–23)
CALCIUM SERPL-MCNC: 9.9 MG/DL — SIGNIFICANT CHANGE UP (ref 8.4–10.5)
CHLORIDE SERPL-SCNC: 102 MMOL/L — SIGNIFICANT CHANGE UP (ref 98–107)
CHOLEST SERPL-MCNC: 183 MG/DL — SIGNIFICANT CHANGE UP
CO2 SERPL-SCNC: 22 MMOL/L — SIGNIFICANT CHANGE UP (ref 22–31)
CREAT SERPL-MCNC: 1.07 MG/DL — SIGNIFICANT CHANGE UP (ref 0.5–1.3)
EGFR: 79 ML/MIN/1.73M2 — SIGNIFICANT CHANGE UP
ESTIMATED AVERAGE GLUCOSE: 171 — SIGNIFICANT CHANGE UP
GLUCOSE BLDC GLUCOMTR-MCNC: 122 MG/DL — HIGH (ref 70–99)
GLUCOSE BLDC GLUCOMTR-MCNC: 153 MG/DL — HIGH (ref 70–99)
GLUCOSE BLDC GLUCOMTR-MCNC: 167 MG/DL — HIGH (ref 70–99)
GLUCOSE BLDC GLUCOMTR-MCNC: 173 MG/DL — HIGH (ref 70–99)
GLUCOSE BLDC GLUCOMTR-MCNC: 211 MG/DL — HIGH (ref 70–99)
GLUCOSE SERPL-MCNC: 146 MG/DL — HIGH (ref 70–99)
HCT VFR BLD CALC: 47.5 % — SIGNIFICANT CHANGE UP (ref 39–50)
HCV AB S/CO SERPL IA: 0.11 S/CO — SIGNIFICANT CHANGE UP (ref 0–0.99)
HCV AB SERPL-IMP: SIGNIFICANT CHANGE UP
HDLC SERPL-MCNC: 47 MG/DL — SIGNIFICANT CHANGE UP
HGB BLD-MCNC: 17.1 G/DL — HIGH (ref 13–17)
LIPID PNL WITH DIRECT LDL SERPL: 110 MG/DL — HIGH
MAGNESIUM SERPL-MCNC: 2.1 MG/DL — SIGNIFICANT CHANGE UP (ref 1.6–2.6)
MCHC RBC-ENTMCNC: 29.8 PG — SIGNIFICANT CHANGE UP (ref 27–34)
MCHC RBC-ENTMCNC: 36 GM/DL — SIGNIFICANT CHANGE UP (ref 32–36)
MCV RBC AUTO: 82.8 FL — SIGNIFICANT CHANGE UP (ref 80–100)
NON HDL CHOLESTEROL: 136 MG/DL — HIGH
NRBC # BLD: 0 /100 WBCS — SIGNIFICANT CHANGE UP (ref 0–0)
NRBC # FLD: 0 K/UL — SIGNIFICANT CHANGE UP (ref 0–0)
PHOSPHATE SERPL-MCNC: 4.4 MG/DL — SIGNIFICANT CHANGE UP (ref 2.5–4.5)
PLATELET # BLD AUTO: 319 K/UL — SIGNIFICANT CHANGE UP (ref 150–400)
POTASSIUM SERPL-MCNC: 4.5 MMOL/L — SIGNIFICANT CHANGE UP (ref 3.5–5.3)
POTASSIUM SERPL-SCNC: 4.5 MMOL/L — SIGNIFICANT CHANGE UP (ref 3.5–5.3)
RBC # BLD: 5.74 M/UL — SIGNIFICANT CHANGE UP (ref 4.2–5.8)
RBC # FLD: 13.3 % — SIGNIFICANT CHANGE UP (ref 10.3–14.5)
SODIUM SERPL-SCNC: 137 MMOL/L — SIGNIFICANT CHANGE UP (ref 135–145)
TRIGL SERPL-MCNC: 131 MG/DL — SIGNIFICANT CHANGE UP
TSH SERPL-MCNC: 2.72 UIU/ML — SIGNIFICANT CHANGE UP (ref 0.27–4.2)
WBC # BLD: 6.86 K/UL — SIGNIFICANT CHANGE UP (ref 3.8–10.5)
WBC # FLD AUTO: 6.86 K/UL — SIGNIFICANT CHANGE UP (ref 3.8–10.5)

## 2022-08-24 PROCEDURE — 93018 CV STRESS TEST I&R ONLY: CPT | Mod: GC

## 2022-08-24 PROCEDURE — 78452 HT MUSCLE IMAGE SPECT MULT: CPT | Mod: 26

## 2022-08-24 PROCEDURE — 99233 SBSQ HOSP IP/OBS HIGH 50: CPT

## 2022-08-24 PROCEDURE — 93016 CV STRESS TEST SUPVJ ONLY: CPT | Mod: GC

## 2022-08-24 PROCEDURE — 93306 TTE W/DOPPLER COMPLETE: CPT | Mod: 26

## 2022-08-24 RX ORDER — KETOROLAC TROMETHAMINE 30 MG/ML
15 SYRINGE (ML) INJECTION ONCE
Refills: 0 | Status: DISCONTINUED | OUTPATIENT
Start: 2022-08-24 | End: 2022-08-24

## 2022-08-24 RX ADMIN — Medication 1: at 09:20

## 2022-08-24 RX ADMIN — ENOXAPARIN SODIUM 40 MILLIGRAM(S): 100 INJECTION SUBCUTANEOUS at 18:52

## 2022-08-24 RX ADMIN — Medication 81 MILLIGRAM(S): at 11:26

## 2022-08-24 RX ADMIN — GABAPENTIN 100 MILLIGRAM(S): 400 CAPSULE ORAL at 21:57

## 2022-08-24 RX ADMIN — ATORVASTATIN CALCIUM 20 MILLIGRAM(S): 80 TABLET, FILM COATED ORAL at 21:57

## 2022-08-24 RX ADMIN — Medication 650 MILLIGRAM(S): at 22:18

## 2022-08-24 RX ADMIN — LISINOPRIL 10 MILLIGRAM(S): 2.5 TABLET ORAL at 05:40

## 2022-08-24 RX ADMIN — AMLODIPINE BESYLATE 10 MILLIGRAM(S): 2.5 TABLET ORAL at 05:40

## 2022-08-24 RX ADMIN — Medication 1 DROP(S): at 21:57

## 2022-08-24 RX ADMIN — Medication 1: at 15:13

## 2022-08-24 NOTE — PHYSICAL THERAPY INITIAL EVALUATION ADULT - ADDITIONAL COMMENTS
Pt lives in a house with 3-4 exterior steps to enter. Bedroom located on main level. Prior to admission, pt ambulated independently, no assistive device.

## 2022-08-24 NOTE — PHYSICAL THERAPY INITIAL EVALUATION ADULT - PERTINENT HX OF CURRENT PROBLEM, REHAB EVAL
Pt is a 62 year old male presenting with right UE/ chest pain. Unlikely cardiac etiology. CTH and CT c/s unremarkable. Suspect Radiculopathy of C-Spine. PMH listed below.

## 2022-08-24 NOTE — PROGRESS NOTE ADULT - SUBJECTIVE AND OBJECTIVE BOX
INTERVAL HPI/OVERNIGHT EVENTS:  Patient was seen and examined. Patient complaining of RUE pain that radiates to the neck. Pain exacerbated with movement. Patient w/o chest pain currently or shortness of breath. ROS otherwise negative.    VITAL SIGNS:  T(F): 97.5 (08-24-22 @ 18:48)  HR: 75 (08-24-22 @ 20:15)  BP: 119/78 (08-24-22 @ 18:48)  RR: 18 (08-24-22 @ 18:48)  SpO2: 98% (08-24-22 @ 18:48)  Wt(kg): --    PHYSICAL EXAM:  Constitutional: mild distress 2/2 to pain  HEENT: eyes red bilaterally (per patient history of dry eye), neck supple, no masses, no JVD, MMM   Respiratory: CTA b/l, good air entry b/l, no wheezing, no rhonchi, no rales, without accessory muscle use and no intercostal retractions  Cardiovascular: RRR, normal S1S2, no M/R/G  Gastrointestinal: soft, NTND, no masses palpable, BS normal  MSK: no tenderness to palpation of cervical spine or paraspinal cervical spine, + pain RUE w/ active and passive range of motion from neck to hand, + mild swelling right hand  Extremities: Warm, well perfused, pulses equal bilateral upper and lower extremities, no edema, no clubbing  Neurological: AAOx3, CN Grossly intact  Skin: Normal temperature, warm, dry    MEDICATIONS  (STANDING):  amLODIPine   Tablet 10 milliGRAM(s) Oral daily  aspirin enteric coated 81 milliGRAM(s) Oral daily  atorvastatin 20 milliGRAM(s) Oral at bedtime  dextrose 5%. 1000 milliLiter(s) (100 mL/Hr) IV Continuous <Continuous>  dextrose 5%. 1000 milliLiter(s) (50 mL/Hr) IV Continuous <Continuous>  dextrose 50% Injectable 25 Gram(s) IV Push once  dextrose 50% Injectable 12.5 Gram(s) IV Push once  dextrose 50% Injectable 25 Gram(s) IV Push once  enoxaparin Injectable 40 milliGRAM(s) SubCutaneous every 24 hours  gabapentin 100 milliGRAM(s) Oral at bedtime  glucagon  Injectable 1 milliGRAM(s) IntraMuscular once  insulin lispro (ADMELOG) corrective regimen sliding scale   SubCutaneous three times a day before meals  insulin lispro (ADMELOG) corrective regimen sliding scale   SubCutaneous at bedtime  lisinopril 10 milliGRAM(s) Oral daily    MEDICATIONS  (PRN):  acetaminophen     Tablet .. 650 milliGRAM(s) Oral every 6 hours PRN Temp greater or equal to 38C (100.4F), Mild Pain (1 - 3)  artificial tears (preservative free) Ophthalmic Solution 1 Drop(s) Both EYES four times a day PRN Dry Eyes  dextrose Oral Gel 15 Gram(s) Oral once PRN Blood Glucose LESS THAN 70 milliGRAM(s)/deciliter  melatonin 3 milliGRAM(s) Oral at bedtime PRN Insomnia      Allergies    No Known Allergies    Intolerances        LABS:                        17.1   6.86  )-----------( 319      ( 24 Aug 2022 06:13 )             47.5     08-24    137  |  102  |  12  ----------------------------<  146<H>  4.5   |  22  |  1.07    Ca    9.9      24 Aug 2022 06:13  Phos  4.4     08-24  Mg     2.10     08-24    TPro  7.6  /  Alb  4.8  /  TBili  0.5  /  DBili  x   /  AST  16  /  ALT  29  /  AlkPhos  75  08-23    CT head + CT neck:   BRAIN CT :Normal non-contrast CT of the brain.  CERVICAL SPINE CT: Normal exam. No acute fracture or traumatic   subluxation. Recommend MR for more complete evaluation as clinically   warranted.    ECHO:   1. Mitral annular calcification, otherwise normal mitral  valve. Minimal mitral regurgitation.  2. Normal left ventricular internal dimensions and wall  thicknesses.  3. Normal left ventricular systolic function. No segmental  wall motion abnormalities.    Stress Test:   * Myocardial Perfusion SPECT results are normal.  * Review of raw data shows: The study is of good technical  quality.  * The left ventricle was normal in size. Normal myocardial  perfusion scan,with no evidence of infarction or inducible  ischemia.  There is a medium sized, mild-moderate defect  in the basal inferior wall that corrects with prone  imaging suggestive of attenuation artifact.  * Post-stress gated wall motion analysis was performed  (LVEF > 70%;LVEDV = 92 ml.), revealing normal LV function.  *** Compared with Nuclear/Stress test of 10/31/2017, no  significant changes noted.  4. Normal right ventricular size and function.        RADIOLOGY & ADDITIONAL TESTS:  Reviewed

## 2022-08-24 NOTE — PATIENT PROFILE ADULT - FALL HARM RISK - RISK INTERVENTIONS

## 2022-08-24 NOTE — CONSULT NOTE ADULT - ASSESSMENT
chest pain   atypical   appears to be MSK   obtain echo , stress test rule out cad  consider MRI of shoulder spine     HTN   cont current meds    DM II  Monitor finger stick. Insulin coverage. Diabetic education and Diabetic diet. Consider nutrition consultation.

## 2022-08-24 NOTE — PHYSICAL THERAPY INITIAL EVALUATION ADULT - PATIENT PROFILE REVIEW, REHAB EVAL
PT evaluate and treat orders received: no formal activity orders. Consult with AMIRAH GILBERT, pt may participate in PT evaluation./yes

## 2022-08-24 NOTE — PHYSICAL THERAPY INITIAL EVALUATION ADULT - GENERAL OBSERVATIONS, REHAB EVAL
Pt received seated on EOB, +telemetry, in NAD. Pt agreeable to participate in PT evaluation. Pt left seated on EOB as found, all lines intact and RN aware.

## 2022-08-25 LAB
ANION GAP SERPL CALC-SCNC: 10 MMOL/L — SIGNIFICANT CHANGE UP (ref 7–14)
BUN SERPL-MCNC: 15 MG/DL — SIGNIFICANT CHANGE UP (ref 7–23)
CALCIUM SERPL-MCNC: 10 MG/DL — SIGNIFICANT CHANGE UP (ref 8.4–10.5)
CHLORIDE SERPL-SCNC: 102 MMOL/L — SIGNIFICANT CHANGE UP (ref 98–107)
CO2 SERPL-SCNC: 27 MMOL/L — SIGNIFICANT CHANGE UP (ref 22–31)
CREAT SERPL-MCNC: 1.15 MG/DL — SIGNIFICANT CHANGE UP (ref 0.5–1.3)
EGFR: 72 ML/MIN/1.73M2 — SIGNIFICANT CHANGE UP
GLUCOSE BLDC GLUCOMTR-MCNC: 148 MG/DL — HIGH (ref 70–99)
GLUCOSE BLDC GLUCOMTR-MCNC: 150 MG/DL — HIGH (ref 70–99)
GLUCOSE BLDC GLUCOMTR-MCNC: 225 MG/DL — HIGH (ref 70–99)
GLUCOSE BLDC GLUCOMTR-MCNC: 334 MG/DL — HIGH (ref 70–99)
GLUCOSE SERPL-MCNC: 151 MG/DL — HIGH (ref 70–99)
HCT VFR BLD CALC: 47.8 % — SIGNIFICANT CHANGE UP (ref 39–50)
HGB BLD-MCNC: 17.1 G/DL — HIGH (ref 13–17)
MAGNESIUM SERPL-MCNC: 2.3 MG/DL — SIGNIFICANT CHANGE UP (ref 1.6–2.6)
MCHC RBC-ENTMCNC: 29.7 PG — SIGNIFICANT CHANGE UP (ref 27–34)
MCHC RBC-ENTMCNC: 35.8 GM/DL — SIGNIFICANT CHANGE UP (ref 32–36)
MCV RBC AUTO: 83 FL — SIGNIFICANT CHANGE UP (ref 80–100)
NRBC # BLD: 0 /100 WBCS — SIGNIFICANT CHANGE UP (ref 0–0)
NRBC # FLD: 0 K/UL — SIGNIFICANT CHANGE UP (ref 0–0)
PHOSPHATE SERPL-MCNC: 3.6 MG/DL — SIGNIFICANT CHANGE UP (ref 2.5–4.5)
PLATELET # BLD AUTO: 291 K/UL — SIGNIFICANT CHANGE UP (ref 150–400)
POTASSIUM SERPL-MCNC: 4.7 MMOL/L — SIGNIFICANT CHANGE UP (ref 3.5–5.3)
POTASSIUM SERPL-SCNC: 4.7 MMOL/L — SIGNIFICANT CHANGE UP (ref 3.5–5.3)
RBC # BLD: 5.76 M/UL — SIGNIFICANT CHANGE UP (ref 4.2–5.8)
RBC # FLD: 13.4 % — SIGNIFICANT CHANGE UP (ref 10.3–14.5)
SODIUM SERPL-SCNC: 139 MMOL/L — SIGNIFICANT CHANGE UP (ref 135–145)
WBC # BLD: 7.24 K/UL — SIGNIFICANT CHANGE UP (ref 3.8–10.5)
WBC # FLD AUTO: 7.24 K/UL — SIGNIFICANT CHANGE UP (ref 3.8–10.5)

## 2022-08-25 PROCEDURE — 99232 SBSQ HOSP IP/OBS MODERATE 35: CPT

## 2022-08-25 PROCEDURE — 93971 EXTREMITY STUDY: CPT | Mod: 26

## 2022-08-25 RX ORDER — GABAPENTIN 400 MG/1
100 CAPSULE ORAL THREE TIMES A DAY
Refills: 0 | Status: DISCONTINUED | OUTPATIENT
Start: 2022-08-25 | End: 2022-08-25

## 2022-08-25 RX ORDER — SODIUM CHLORIDE 9 MG/ML
1000 INJECTION INTRAMUSCULAR; INTRAVENOUS; SUBCUTANEOUS
Refills: 0 | Status: DISCONTINUED | OUTPATIENT
Start: 2022-08-25 | End: 2022-08-27

## 2022-08-25 RX ORDER — ACETAMINOPHEN 500 MG
650 TABLET ORAL EVERY 6 HOURS
Refills: 0 | Status: COMPLETED | OUTPATIENT
Start: 2022-08-25 | End: 2022-08-27

## 2022-08-25 RX ORDER — IBUPROFEN 200 MG
600 TABLET ORAL ONCE
Refills: 0 | Status: COMPLETED | OUTPATIENT
Start: 2022-08-25 | End: 2022-08-25

## 2022-08-25 RX ORDER — CYCLOBENZAPRINE HYDROCHLORIDE 10 MG/1
10 TABLET, FILM COATED ORAL THREE TIMES A DAY
Refills: 0 | Status: DISCONTINUED | OUTPATIENT
Start: 2022-08-25 | End: 2022-08-25

## 2022-08-25 RX ORDER — INSULIN LISPRO 100/ML
3 VIAL (ML) SUBCUTANEOUS ONCE
Refills: 0 | Status: COMPLETED | OUTPATIENT
Start: 2022-08-25 | End: 2022-08-25

## 2022-08-25 RX ORDER — CYCLOBENZAPRINE HYDROCHLORIDE 10 MG/1
10 TABLET, FILM COATED ORAL THREE TIMES A DAY
Refills: 0 | Status: DISCONTINUED | OUTPATIENT
Start: 2022-08-25 | End: 2022-08-30

## 2022-08-25 RX ORDER — GABAPENTIN 400 MG/1
300 CAPSULE ORAL THREE TIMES A DAY
Refills: 0 | Status: DISCONTINUED | OUTPATIENT
Start: 2022-08-25 | End: 2022-08-30

## 2022-08-25 RX ORDER — IBUPROFEN 200 MG
600 TABLET ORAL EVERY 6 HOURS
Refills: 0 | Status: COMPLETED | OUTPATIENT
Start: 2022-08-25 | End: 2022-08-27

## 2022-08-25 RX ORDER — LIDOCAINE 4 G/100G
1 CREAM TOPICAL EVERY 24 HOURS
Refills: 0 | Status: COMPLETED | OUTPATIENT
Start: 2022-08-25 | End: 2022-08-29

## 2022-08-25 RX ADMIN — Medication 650 MILLIGRAM(S): at 18:20

## 2022-08-25 RX ADMIN — GABAPENTIN 100 MILLIGRAM(S): 400 CAPSULE ORAL at 15:56

## 2022-08-25 RX ADMIN — Medication 650 MILLIGRAM(S): at 22:51

## 2022-08-25 RX ADMIN — ENOXAPARIN SODIUM 40 MILLIGRAM(S): 100 INJECTION SUBCUTANEOUS at 17:56

## 2022-08-25 RX ADMIN — LISINOPRIL 10 MILLIGRAM(S): 2.5 TABLET ORAL at 06:24

## 2022-08-25 RX ADMIN — Medication 600 MILLIGRAM(S): at 22:50

## 2022-08-25 RX ADMIN — AMLODIPINE BESYLATE 10 MILLIGRAM(S): 2.5 TABLET ORAL at 06:25

## 2022-08-25 RX ADMIN — Medication 600 MILLIGRAM(S): at 17:49

## 2022-08-25 RX ADMIN — GABAPENTIN 300 MILLIGRAM(S): 400 CAPSULE ORAL at 22:50

## 2022-08-25 RX ADMIN — Medication 3 UNIT(S): at 12:30

## 2022-08-25 RX ADMIN — ATORVASTATIN CALCIUM 20 MILLIGRAM(S): 80 TABLET, FILM COATED ORAL at 22:49

## 2022-08-25 RX ADMIN — Medication 600 MILLIGRAM(S): at 18:20

## 2022-08-25 RX ADMIN — Medication 81 MILLIGRAM(S): at 11:57

## 2022-08-25 RX ADMIN — Medication 650 MILLIGRAM(S): at 17:49

## 2022-08-25 RX ADMIN — Medication 600 MILLIGRAM(S): at 06:46

## 2022-08-25 RX ADMIN — Medication 4: at 12:01

## 2022-08-25 RX ADMIN — LIDOCAINE 1 PATCH: 4 CREAM TOPICAL at 17:44

## 2022-08-25 RX ADMIN — CYCLOBENZAPRINE HYDROCHLORIDE 10 MILLIGRAM(S): 10 TABLET, FILM COATED ORAL at 22:48

## 2022-08-25 RX ADMIN — SODIUM CHLORIDE 100 MILLILITER(S): 9 INJECTION INTRAMUSCULAR; INTRAVENOUS; SUBCUTANEOUS at 16:07

## 2022-08-25 NOTE — PROGRESS NOTE ADULT - SUBJECTIVE AND OBJECTIVE BOX
DATE OF SERVICE: 08-25-22 @ 10:12    Subjective: Patient seen and examined. No new events except as noted.     SUBJECTIVE/ROS:        MEDICATIONS:  MEDICATIONS  (STANDING):  amLODIPine   Tablet 10 milliGRAM(s) Oral daily  aspirin enteric coated 81 milliGRAM(s) Oral daily  atorvastatin 20 milliGRAM(s) Oral at bedtime  dextrose 5%. 1000 milliLiter(s) (100 mL/Hr) IV Continuous <Continuous>  dextrose 5%. 1000 milliLiter(s) (50 mL/Hr) IV Continuous <Continuous>  dextrose 50% Injectable 25 Gram(s) IV Push once  dextrose 50% Injectable 12.5 Gram(s) IV Push once  dextrose 50% Injectable 25 Gram(s) IV Push once  enoxaparin Injectable 40 milliGRAM(s) SubCutaneous every 24 hours  gabapentin 100 milliGRAM(s) Oral at bedtime  glucagon  Injectable 1 milliGRAM(s) IntraMuscular once  insulin lispro (ADMELOG) corrective regimen sliding scale   SubCutaneous three times a day before meals  insulin lispro (ADMELOG) corrective regimen sliding scale   SubCutaneous at bedtime  lisinopril 10 milliGRAM(s) Oral daily      PHYSICAL EXAM:  T(C): 36.4 (08-25-22 @ 04:25), Max: 36.7 (08-24-22 @ 11:34)  HR: 66 (08-25-22 @ 04:25) (65 - 87)  BP: 115/73 (08-25-22 @ 04:25) (115/73 - 130/76)  RR: 16 (08-25-22 @ 04:25) (16 - 18)  SpO2: 95% (08-25-22 @ 04:25) (95% - 100%)  Wt(kg): --  I&O's Summary          JVP: Normal  Neck: supple  Lung: clear   CV: S1 S2 , Murmur:  Abd: soft  Ext: No edema  neuro: Awake / alert  Psych: flat affect  Skin: normal``    LABS/DATA:    CARDIAC MARKERS:                                17.1   7.24  )-----------( 291      ( 25 Aug 2022 06:26 )             47.8     08-25    139  |  102  |  15  ----------------------------<  151<H>  4.7   |  27  |  1.15    Ca    10.0      25 Aug 2022 06:26  Phos  3.6     08-25  Mg     2.30     08-25    TPro  7.6  /  Alb  4.8  /  TBili  0.5  /  DBili  x   /  AST  16  /  ALT  29  /  AlkPhos  75  08-23    proBNP:   Lipid Profile:   HgA1c:   TSH:     TELE:  EKG:         DATE OF SERVICE: 08-25-22 @ 10:12    Subjective: Patient seen and examined. No new events except as noted.     SUBJECTIVE/ROS:  feels ok       MEDICATIONS:  MEDICATIONS  (STANDING):  amLODIPine   Tablet 10 milliGRAM(s) Oral daily  aspirin enteric coated 81 milliGRAM(s) Oral daily  atorvastatin 20 milliGRAM(s) Oral at bedtime  dextrose 5%. 1000 milliLiter(s) (100 mL/Hr) IV Continuous <Continuous>  dextrose 5%. 1000 milliLiter(s) (50 mL/Hr) IV Continuous <Continuous>  dextrose 50% Injectable 25 Gram(s) IV Push once  dextrose 50% Injectable 12.5 Gram(s) IV Push once  dextrose 50% Injectable 25 Gram(s) IV Push once  enoxaparin Injectable 40 milliGRAM(s) SubCutaneous every 24 hours  gabapentin 100 milliGRAM(s) Oral at bedtime  glucagon  Injectable 1 milliGRAM(s) IntraMuscular once  insulin lispro (ADMELOG) corrective regimen sliding scale   SubCutaneous three times a day before meals  insulin lispro (ADMELOG) corrective regimen sliding scale   SubCutaneous at bedtime  lisinopril 10 milliGRAM(s) Oral daily      PHYSICAL EXAM:  T(C): 36.4 (08-25-22 @ 04:25), Max: 36.7 (08-24-22 @ 11:34)  HR: 66 (08-25-22 @ 04:25) (65 - 87)  BP: 115/73 (08-25-22 @ 04:25) (115/73 - 130/76)  RR: 16 (08-25-22 @ 04:25) (16 - 18)  SpO2: 95% (08-25-22 @ 04:25) (95% - 100%)  Wt(kg): --  I&O's Summary          JVP: Normal  Neck: supple  Lung: clear   CV: S1 S2 , Murmur:  Abd: soft  Ext: No edema  neuro: Awake / alert  Psych: flat affect  Skin: normal``    LABS/DATA:    CARDIAC MARKERS:                                17.1   7.24  )-----------( 291      ( 25 Aug 2022 06:26 )             47.8     08-25    139  |  102  |  15  ----------------------------<  151<H>  4.7   |  27  |  1.15    Ca    10.0      25 Aug 2022 06:26  Phos  3.6     08-25  Mg     2.30     08-25    TPro  7.6  /  Alb  4.8  /  TBili  0.5  /  DBili  x   /  AST  16  /  ALT  29  /  AlkPhos  75  08-23    proBNP:   Lipid Profile:   HgA1c:   TSH:     TELE:  EKG:

## 2022-08-25 NOTE — OCCUPATIONAL THERAPY INITIAL EVALUATION ADULT - GROSSLY INTACT, SENSORY
Reports diminished sensation of right UE (josué. hand) compared to left UE. Bilateral UE/LE appear grossly intact

## 2022-08-25 NOTE — OCCUPATIONAL THERAPY INITIAL EVALUATION ADULT - PERTINENT HX OF CURRENT PROBLEM, REHAB EVAL
62 year old male with history of JOHNNIE on CPAP, HTN, HLD, DM2, and PCV c/b splenic infarct in 2012 presents with right sided arm and chest pain. ACS ruled out, negative stress test, echo. Remains admitted for cervical radiculopathy workup. Pending MRI of cervical spine/right shoulder.

## 2022-08-25 NOTE — PROGRESS NOTE ADULT - SUBJECTIVE AND OBJECTIVE BOX
INTERVAL HPI/OVERNIGHT EVENTS:  Patient was seen and examined. Patient complaining of RUE pain that radiates to the neck worse with abduction. W/o chest pain or shortness of breath currently. ROS otherwise negative.     Vital Signs Last 24 Hrs  T(C): 36.8 (25 Aug 2022 12:47), Max: 36.8 (25 Aug 2022 12:47)  T(F): 98.3 (25 Aug 2022 12:47), Max: 98.3 (25 Aug 2022 12:47)  HR: 94 (25 Aug 2022 12:47) (65 - 94)  BP: 109/75 (25 Aug 2022 12:47) (109/75 - 127/82)  BP(mean): --  RR: 16 (25 Aug 2022 12:47) (16 - 18)  SpO2: 96% (25 Aug 2022 12:47) (95% - 100%)    Parameters below as of 25 Aug 2022 12:47  Patient On (Oxygen Delivery Method): room air    CAPILLARY BLOOD GLUCOSE  POCT Blood Glucose.: 334 mg/dL (25 Aug 2022 11:40)  POCT Blood Glucose.: 148 mg/dL (25 Aug 2022 07:48)  POCT Blood Glucose.: 153 mg/dL (24 Aug 2022 21:54)  POCT Blood Glucose.: 122 mg/dL (24 Aug 2022 17:26)  POCT Blood Glucose.: 167 mg/dL (24 Aug 2022 14:49)    PHYSICAL EXAM:  Constitutional: mild distress 2/2 to pain  HEENT: eyes red bilaterally (per patient history of dry eye), neck supple, no masses, no JVD, MMM   Respiratory: CTA b/l, good air entry b/l, no wheezing, no rhonchi, no rales, without accessory muscle use and no intercostal retractions  Cardiovascular: RRR, normal S1S2, no M/R/G  Gastrointestinal: soft, NTND, no masses palpable, BS normal  MSK: no tenderness to palpation of cervical spine or paraspinal cervical spine, + pain RUE w/ active and passive range of motion from neck to hand, + mild swelling right hand  Extremities: Warm, well perfused, pulses equal bilateral upper and lower extremities, no edema, no clubbing  Neurological: AAOx3, CN Grossly intact  Skin: Normal temperature, warm, dry    MEDICATIONS  (STANDING):  amLODIPine   Tablet 10 milliGRAM(s) Oral daily  aspirin enteric coated 81 milliGRAM(s) Oral daily  atorvastatin 20 milliGRAM(s) Oral at bedtime  dextrose 5%. 1000 milliLiter(s) (100 mL/Hr) IV Continuous <Continuous>  dextrose 5%. 1000 milliLiter(s) (50 mL/Hr) IV Continuous <Continuous>  dextrose 50% Injectable 25 Gram(s) IV Push once  dextrose 50% Injectable 12.5 Gram(s) IV Push once  dextrose 50% Injectable 25 Gram(s) IV Push once  enoxaparin Injectable 40 milliGRAM(s) SubCutaneous every 24 hours  gabapentin 100 milliGRAM(s) Oral at bedtime  glucagon  Injectable 1 milliGRAM(s) IntraMuscular once  insulin lispro (ADMELOG) corrective regimen sliding scale   SubCutaneous three times a day before meals  insulin lispro (ADMELOG) corrective regimen sliding scale   SubCutaneous at bedtime  lisinopril 10 milliGRAM(s) Oral daily    MEDICATIONS  (PRN):  acetaminophen     Tablet .. 650 milliGRAM(s) Oral every 6 hours PRN Temp greater or equal to 38C (100.4F), Mild Pain (1 - 3)  artificial tears (preservative free) Ophthalmic Solution 1 Drop(s) Both EYES four times a day PRN Dry Eyes  dextrose Oral Gel 15 Gram(s) Oral once PRN Blood Glucose LESS THAN 70 milliGRAM(s)/deciliter  melatonin 3 milliGRAM(s) Oral at bedtime PRN Insomnia      Allergies    No Known Allergies    Intolerances    LABS:                         17.1   7.24  )-----------( 291      ( 25 Aug 2022 06:26 )             47.8     08-25    139  |  102  |  15  ----------------------------<  151<H>  4.7   |  27  |  1.15    Ca    10.0      25 Aug 2022 06:26  Phos  3.6     08-25  Mg     2.30     08-25                    RADIOLOGY, EKG & ADDITIONAL TESTS: Reviewed.     CT head + CT neck:   BRAIN CT :Normal non-contrast CT of the brain.  CERVICAL SPINE CT: Normal exam. No acute fracture or traumatic   subluxation. Recommend MR for more complete evaluation as clinically   warranted.    ECHO:   1. Mitral annular calcification, otherwise normal mitral  valve. Minimal mitral regurgitation.  2. Normal left ventricular internal dimensions and wall  thicknesses.  3. Normal left ventricular systolic function. No segmental  wall motion abnormalities.    Stress Test:   * Myocardial Perfusion SPECT results are normal.  * Review of raw data shows: The study is of good technical  quality.  * The left ventricle was normal in size. Normal myocardial  perfusion scan,with no evidence of infarction or inducible  ischemia.  There is a medium sized, mild-moderate defect  in the basal inferior wall that corrects with prone  imaging suggestive of attenuation artifact.  * Post-stress gated wall motion analysis was performed  (LVEF > 70%;LVEDV = 92 ml.), revealing normal LV function.  *** Compared with Nuclear/Stress test of 10/31/2017, no  significant changes noted.  4. Normal right ventricular size and function.        RADIOLOGY & ADDITIONAL TESTS:  Reviewed

## 2022-08-25 NOTE — OCCUPATIONAL THERAPY INITIAL EVALUATION ADULT - RANGE OF MOTION EXAMINATION, UPPER EXTREMITY
Right UE active ROM WFL except right shoulder grossly 0-100 degrees flexion, 0-90 degrees abduction (limited due to discomfort)/Left UE Active ROM was WFL (within functional limits)

## 2022-08-25 NOTE — CONSULT NOTE ADULT - SUBJECTIVE AND OBJECTIVE BOX
Chief Complaint: Right arm pain    HPI:  62 y.o male pmh of JOHNNIE on CPAP, HTN, HLD, DM2, and PCV c/b splenic infarct in  presents with sided arm and chest pain. Patient states that he has been having pain that starts in the right side of his neck and radiates into this right shoulder and through his right arm to his hand, described as a shartp/shooting electric type pain that is up to 10/10 and vairable in duration, exacerbated by moving his right arm since May of 2022. Feels that his right arm is weaker than his left, and due to pain he can not lift it up well. He notes  that he occasionally has SOB during these episodes and due to the pain has not been ambulating as much.  he states he wanted to be evaluated for this sooner but his mother was very sick and was taking care of her, he recently went to [place] for her  and returned on .  Pain associated with numbness/tingling in right arm. Denies fever, chills, nausea, vomiting, palpitations, dizziness, fall, syncope, head or neck trauma, visual change, hearing change, unintentional weight loss, abd pain, diarrhea, constipation, brbpr, melena, edema, recent abx. cough.  (23 Aug 2022 16:40)      PAST MEDICAL & SURGICAL HISTORY:  Diabetes      HTN (hypertension)      Hyperlipidemia      Polycythemia vera      Renal infarct      Splenic infarct      No significant past surgical history          FAMILY HISTORY:  Family history of diabetes mellitus (Mother)        SOCIAL HISTORY:  [ ] Denies Smoking, Alcohol, or Drug Use    Allergies    No Known Allergies    Intolerances        PAIN MEDICATIONS:  acetaminophen     Tablet .. 650 milliGRAM(s) Oral every 6 hours PRN  cyclobenzaprine 10 milliGRAM(s) Oral three times a day PRN  gabapentin 100 milliGRAM(s) Oral three times a day  melatonin 3 milliGRAM(s) Oral at bedtime PRN      Heme:  aspirin enteric coated 81 milliGRAM(s) Oral daily  enoxaparin Injectable 40 milliGRAM(s) SubCutaneous every 24 hours    Antibiotics:    Cardiovascular:  amLODIPine   Tablet 10 milliGRAM(s) Oral daily  lisinopril 10 milliGRAM(s) Oral daily    GI:    Endocrine:  atorvastatin 20 milliGRAM(s) Oral at bedtime  dextrose 50% Injectable 25 Gram(s) IV Push once  dextrose 50% Injectable 12.5 Gram(s) IV Push once  dextrose 50% Injectable 25 Gram(s) IV Push once  dextrose Oral Gel 15 Gram(s) Oral once PRN  glucagon  Injectable 1 milliGRAM(s) IntraMuscular once  insulin lispro (ADMELOG) corrective regimen sliding scale   SubCutaneous three times a day before meals  insulin lispro (ADMELOG) corrective regimen sliding scale   SubCutaneous at bedtime    All Other Medications:  artificial tears (preservative free) Ophthalmic Solution 1 Drop(s) Both EYES four times a day PRN  dextrose 5%. 1000 milliLiter(s) IV Continuous <Continuous>  dextrose 5%. 1000 milliLiter(s) IV Continuous <Continuous>  sodium chloride 0.9%. 1000 milliLiter(s) IV Continuous <Continuous>        Vital Signs Last 24 Hrs  T(C): 36.8 (25 Aug 2022 12:47), Max: 36.8 (25 Aug 2022 12:47)  T(F): 98.3 (25 Aug 2022 12:47), Max: 98.3 (25 Aug 2022 12:47)  HR: 94 (25 Aug 2022 12:47) (65 - 94)  BP: 109/75 (25 Aug 2022 12:47) (109/75 - 127/82)  BP(mean): --  RR: 16 (25 Aug 2022 12:47) (16 - 18)  SpO2: 96% (25 Aug 2022 12:47) (95% - 100%)    Parameters below as of 25 Aug 2022 12:47  Patient On (Oxygen Delivery Method): room air        PAIN SCORE:         SCALE USED: (1-10 VNRS)             PHYSICAL EXAM:    GENERAL: NAD, well-groomed, well-developed        LABS:                          17.1   7.24  )-----------( 291      ( 25 Aug 2022 06:26 )             47.8     08-25    139  |  102  |  15  ----------------------------<  151<H>  4.7   |  27  |  1.15    Ca    10.0      25 Aug 2022 06:26  Phos  3.6     08-25  Mg     2.30     08-25      SUMMARY:    Patient seen at bedside. Patient states he has right shoulder pain radiating to his hand. Patient reports that he started having this pain in May 2022 but did not seek help since his mother was very sick. Patient reports worsening of pain since last week. Reports taking Acetaminophen with no pain relief. Patient reports numbness, tingling and weakness on the right arm. Describes pain as stinging, shocking followed with soreness. Patient states he did not take any opioids for this pain because he wants to find the cause of this pain. Reports he is waiting for ultrasound results and an MRI. Patient reports stiffness to the right arm which is making it difficult to lift his arm up. Reports worsening of pain with activity. Denies opioid use at home. Denies alcohol use, smoking and illicit drug use.   Patient alert and oriented x 4. Patient answers questions appropriately. Patient maintains eye contact. Not in any apparent distress.   RECOMMENDATIONS:  Recommend discontinuing current orders for Acetaminophen instead order:  PO Acetaminophen 65mg Q6H standing X2 days then PRN for pain. Not to be given within 6 hours of last dose of Acetaminophen.  2)	Recommend PO Motrin 600mg Q6H standing x2 days. Not to be given within 6 hours of last dose of any NSAIDs.  3)	Recommend changing Flexeril orders to PO Flexeril 10mg Q8H standing. Hold for over sedation.  4)	Recommend changing Gabapentin orders to PO Gabapentin 300mg Q8H. Hold for sedation.  5)	Recommend Lidocaine patch to right shoulder X 5 days. 12 hours ON/12 hours OFF.  6)	Consider PO Oxycodone 5mg Q6H PRN for moderate and severe pain if pain not managed with the above non opioid regimen. Hold for sedation. Not to be given within one hour of any immediate acting opioid.    Called chronic pain attending Dr. Harrell, waiting for call back. Pain service to sign off. Please call pain service if further assistance needed with pain management.  
CHIEF COMPLAINT:Patient is a 62y old  Male who presents with a chief complaint of Chest pain (23 Aug 2022 16:40)      HISTORY OF PRESENT ILLNESS:    62 male with history as below presents complaining of right arm , shoulder pain and right sided chest pain   worse with movement and raising arm   no sob  no dizziness  no syncope     PAST MEDICAL & SURGICAL HISTORY:  Diabetes      HTN (hypertension)      Hyperlipidemia      Polycythemia vera      Renal infarct      Splenic infarct      No significant past surgical history              MEDICATIONS:  amLODIPine   Tablet 10 milliGRAM(s) Oral daily  aspirin enteric coated 81 milliGRAM(s) Oral daily  enoxaparin Injectable 40 milliGRAM(s) SubCutaneous every 24 hours  lisinopril 10 milliGRAM(s) Oral daily        acetaminophen     Tablet .. 650 milliGRAM(s) Oral every 6 hours PRN  gabapentin 100 milliGRAM(s) Oral at bedtime  melatonin 3 milliGRAM(s) Oral at bedtime PRN      atorvastatin 20 milliGRAM(s) Oral at bedtime  dextrose 50% Injectable 25 Gram(s) IV Push once  dextrose 50% Injectable 12.5 Gram(s) IV Push once  dextrose 50% Injectable 25 Gram(s) IV Push once  dextrose Oral Gel 15 Gram(s) Oral once PRN  glucagon  Injectable 1 milliGRAM(s) IntraMuscular once  insulin lispro (ADMELOG) corrective regimen sliding scale   SubCutaneous three times a day before meals  insulin lispro (ADMELOG) corrective regimen sliding scale   SubCutaneous at bedtime    dextrose 5%. 1000 milliLiter(s) IV Continuous <Continuous>  dextrose 5%. 1000 milliLiter(s) IV Continuous <Continuous>      FAMILY HISTORY:  Family history of diabetes mellitus (Mother)        Non-contributory    SOCIAL HISTORY:    No tobacco, drugs or etoh    Allergies    No Known Allergies    Intolerances    	    REVIEW OF SYSTEMS:  as above  The rest of the 14 points ROS reviewed and except above they are unremarkable.        PHYSICAL EXAM:  T(C): 36.9 (08-24-22 @ 05:34), Max: 36.9 (08-23-22 @ 18:49)  HR: 65 (08-24-22 @ 05:34) (59 - 82)  BP: 110/77 (08-24-22 @ 05:34) (110/77 - 145/85)  RR: 18 (08-24-22 @ 05:34) (16 - 18)  SpO2: 100% (08-24-22 @ 05:34) (97% - 100%)  Wt(kg): --  I&O's Summary      JVP: Normal  Neck: supple  Lung: clear   CV: S1 S2 , Murmur:  Abd: soft  Ext: No edema  neuro: Awake / alert  Psych: flat affect  Skin: normal    LABS/DATA:    TELEMETRY: 	    ECG:  	sinus    	  CARDIAC MARKERS:                        6 <<== 08-23-22 @ 11:20                              16.7   6.23  )-----------( 296      ( 23 Aug 2022 11:20 )             45.4     08-23    139  |  103  |  9   ----------------------------<  186<H>  4.3   |  25  |  1.05    Ca    10.2      23 Aug 2022 11:20  Phos  3.5     08-23  Mg     2.10     08-23    TPro  7.6  /  Alb  4.8  /  TBili  0.5  /  DBili  x   /  AST  16  /  ALT  29  /  AlkPhos  75  08-23    proBNP: Serum Pro-Brain Natriuretic Peptide: <5 pg/mL (08-23 @ 11:20)    Lipid Profile:   HgA1c:   TSH: Thyroid Stimulating Hormone, Serum: 2.32 uIU/mL (08-23 @ 11:20)

## 2022-08-26 LAB
ANION GAP SERPL CALC-SCNC: 10 MMOL/L — SIGNIFICANT CHANGE UP (ref 7–14)
BUN SERPL-MCNC: 14 MG/DL — SIGNIFICANT CHANGE UP (ref 7–23)
CALCIUM SERPL-MCNC: 9.8 MG/DL — SIGNIFICANT CHANGE UP (ref 8.4–10.5)
CHLORIDE SERPL-SCNC: 108 MMOL/L — HIGH (ref 98–107)
CO2 SERPL-SCNC: 25 MMOL/L — SIGNIFICANT CHANGE UP (ref 22–31)
CREAT SERPL-MCNC: 1.05 MG/DL — SIGNIFICANT CHANGE UP (ref 0.5–1.3)
EGFR: 81 ML/MIN/1.73M2 — SIGNIFICANT CHANGE UP
GLUCOSE BLDC GLUCOMTR-MCNC: 106 MG/DL — HIGH (ref 70–99)
GLUCOSE BLDC GLUCOMTR-MCNC: 135 MG/DL — HIGH (ref 70–99)
GLUCOSE BLDC GLUCOMTR-MCNC: 159 MG/DL — HIGH (ref 70–99)
GLUCOSE BLDC GLUCOMTR-MCNC: 323 MG/DL — HIGH (ref 70–99)
GLUCOSE SERPL-MCNC: 133 MG/DL — HIGH (ref 70–99)
HCT VFR BLD CALC: 46.7 % — SIGNIFICANT CHANGE UP (ref 39–50)
HGB BLD-MCNC: 16.5 G/DL — SIGNIFICANT CHANGE UP (ref 13–17)
MAGNESIUM SERPL-MCNC: 2.2 MG/DL — SIGNIFICANT CHANGE UP (ref 1.6–2.6)
MCHC RBC-ENTMCNC: 29.5 PG — SIGNIFICANT CHANGE UP (ref 27–34)
MCHC RBC-ENTMCNC: 35.3 GM/DL — SIGNIFICANT CHANGE UP (ref 32–36)
MCV RBC AUTO: 83.4 FL — SIGNIFICANT CHANGE UP (ref 80–100)
NRBC # BLD: 0 /100 WBCS — SIGNIFICANT CHANGE UP (ref 0–0)
NRBC # FLD: 0 K/UL — SIGNIFICANT CHANGE UP (ref 0–0)
PHOSPHATE SERPL-MCNC: 3.3 MG/DL — SIGNIFICANT CHANGE UP (ref 2.5–4.5)
PLATELET # BLD AUTO: 287 K/UL — SIGNIFICANT CHANGE UP (ref 150–400)
POTASSIUM SERPL-MCNC: 4.5 MMOL/L — SIGNIFICANT CHANGE UP (ref 3.5–5.3)
POTASSIUM SERPL-SCNC: 4.5 MMOL/L — SIGNIFICANT CHANGE UP (ref 3.5–5.3)
RBC # BLD: 5.6 M/UL — SIGNIFICANT CHANGE UP (ref 4.2–5.8)
RBC # FLD: 13.7 % — SIGNIFICANT CHANGE UP (ref 10.3–14.5)
SODIUM SERPL-SCNC: 143 MMOL/L — SIGNIFICANT CHANGE UP (ref 135–145)
WBC # BLD: 8.12 K/UL — SIGNIFICANT CHANGE UP (ref 3.8–10.5)
WBC # FLD AUTO: 8.12 K/UL — SIGNIFICANT CHANGE UP (ref 3.8–10.5)

## 2022-08-26 PROCEDURE — 99232 SBSQ HOSP IP/OBS MODERATE 35: CPT

## 2022-08-26 RX ADMIN — Medication 600 MILLIGRAM(S): at 18:00

## 2022-08-26 RX ADMIN — Medication 600 MILLIGRAM(S): at 22:40

## 2022-08-26 RX ADMIN — CYCLOBENZAPRINE HYDROCHLORIDE 10 MILLIGRAM(S): 10 TABLET, FILM COATED ORAL at 06:23

## 2022-08-26 RX ADMIN — LIDOCAINE 1 PATCH: 4 CREAM TOPICAL at 06:48

## 2022-08-26 RX ADMIN — Medication 1: at 09:04

## 2022-08-26 RX ADMIN — LIDOCAINE 1 PATCH: 4 CREAM TOPICAL at 17:15

## 2022-08-26 RX ADMIN — LISINOPRIL 10 MILLIGRAM(S): 2.5 TABLET ORAL at 06:23

## 2022-08-26 RX ADMIN — ATORVASTATIN CALCIUM 20 MILLIGRAM(S): 80 TABLET, FILM COATED ORAL at 21:37

## 2022-08-26 RX ADMIN — Medication 650 MILLIGRAM(S): at 22:39

## 2022-08-26 RX ADMIN — Medication 600 MILLIGRAM(S): at 13:00

## 2022-08-26 RX ADMIN — Medication 650 MILLIGRAM(S): at 12:15

## 2022-08-26 RX ADMIN — Medication 650 MILLIGRAM(S): at 17:14

## 2022-08-26 RX ADMIN — AMLODIPINE BESYLATE 10 MILLIGRAM(S): 2.5 TABLET ORAL at 06:23

## 2022-08-26 RX ADMIN — CYCLOBENZAPRINE HYDROCHLORIDE 10 MILLIGRAM(S): 10 TABLET, FILM COATED ORAL at 21:38

## 2022-08-26 RX ADMIN — CYCLOBENZAPRINE HYDROCHLORIDE 10 MILLIGRAM(S): 10 TABLET, FILM COATED ORAL at 15:43

## 2022-08-26 RX ADMIN — GABAPENTIN 300 MILLIGRAM(S): 400 CAPSULE ORAL at 15:43

## 2022-08-26 RX ADMIN — Medication 600 MILLIGRAM(S): at 23:35

## 2022-08-26 RX ADMIN — Medication 650 MILLIGRAM(S): at 23:35

## 2022-08-26 RX ADMIN — Medication 4: at 17:15

## 2022-08-26 RX ADMIN — GABAPENTIN 300 MILLIGRAM(S): 400 CAPSULE ORAL at 06:24

## 2022-08-26 RX ADMIN — Medication 81 MILLIGRAM(S): at 12:15

## 2022-08-26 RX ADMIN — Medication 600 MILLIGRAM(S): at 17:14

## 2022-08-26 RX ADMIN — GABAPENTIN 300 MILLIGRAM(S): 400 CAPSULE ORAL at 21:37

## 2022-08-26 RX ADMIN — Medication 650 MILLIGRAM(S): at 18:00

## 2022-08-26 RX ADMIN — Medication 600 MILLIGRAM(S): at 06:23

## 2022-08-26 RX ADMIN — Medication 600 MILLIGRAM(S): at 07:04

## 2022-08-26 RX ADMIN — Medication 600 MILLIGRAM(S): at 12:15

## 2022-08-26 RX ADMIN — Medication 650 MILLIGRAM(S): at 13:00

## 2022-08-26 RX ADMIN — ENOXAPARIN SODIUM 40 MILLIGRAM(S): 100 INJECTION SUBCUTANEOUS at 18:09

## 2022-08-26 RX ADMIN — Medication 650 MILLIGRAM(S): at 07:04

## 2022-08-26 RX ADMIN — Medication 650 MILLIGRAM(S): at 06:23

## 2022-08-26 NOTE — PROGRESS NOTE ADULT - SUBJECTIVE AND OBJECTIVE BOX
DATE OF SERVICE: 08-26-22 @ 07:09    Subjective: Patient seen and examined. No new events except as noted.     SUBJECTIVE/ROS:  feels ok       MEDICATIONS:  MEDICATIONS  (STANDING):  acetaminophen     Tablet .. 650 milliGRAM(s) Oral every 6 hours  amLODIPine   Tablet 10 milliGRAM(s) Oral daily  aspirin enteric coated 81 milliGRAM(s) Oral daily  atorvastatin 20 milliGRAM(s) Oral at bedtime  cyclobenzaprine 10 milliGRAM(s) Oral three times a day  dextrose 5%. 1000 milliLiter(s) (100 mL/Hr) IV Continuous <Continuous>  dextrose 5%. 1000 milliLiter(s) (50 mL/Hr) IV Continuous <Continuous>  dextrose 50% Injectable 25 Gram(s) IV Push once  dextrose 50% Injectable 12.5 Gram(s) IV Push once  dextrose 50% Injectable 25 Gram(s) IV Push once  enoxaparin Injectable 40 milliGRAM(s) SubCutaneous every 24 hours  gabapentin 300 milliGRAM(s) Oral three times a day  glucagon  Injectable 1 milliGRAM(s) IntraMuscular once  ibuprofen  Tablet. 600 milliGRAM(s) Oral every 6 hours  insulin lispro (ADMELOG) corrective regimen sliding scale   SubCutaneous three times a day before meals  insulin lispro (ADMELOG) corrective regimen sliding scale   SubCutaneous at bedtime  lidocaine   4% Patch 1 Patch Transdermal every 24 hours  lisinopril 10 milliGRAM(s) Oral daily  sodium chloride 0.9%. 1000 milliLiter(s) (100 mL/Hr) IV Continuous <Continuous>      PHYSICAL EXAM:  T(C): 36.9 (08-26-22 @ 06:23), Max: 37.1 (08-25-22 @ 21:17)  HR: 67 (08-26-22 @ 06:23) (67 - 94)  BP: 142/81 (08-26-22 @ 06:23) (109/75 - 142/81)  RR: 18 (08-26-22 @ 06:23) (16 - 18)  SpO2: 97% (08-26-22 @ 06:23) (96% - 97%)  Wt(kg): --  I&O's Summary    25 Aug 2022 07:01  -  26 Aug 2022 07:00  --------------------------------------------------------  IN: 240 mL / OUT: 400 mL / NET: -160 mL            JVP: Normal  Neck: supple  Lung: clear   CV: S1 S2 , Murmur:  Abd: soft  Ext: No edema  neuro: Awake / alert  Psych: flat affect  Skin: normal``    LABS/DATA:    CARDIAC MARKERS:                                17.1   7.24  )-----------( 291      ( 25 Aug 2022 06:26 )             47.8     08-25    139  |  102  |  15  ----------------------------<  151<H>  4.7   |  27  |  1.15    Ca    10.0      25 Aug 2022 06:26  Phos  3.6     08-25  Mg     2.30     08-25      proBNP:   Lipid Profile:   HgA1c:   TSH:     TELE:  EKG:

## 2022-08-26 NOTE — PROGRESS NOTE ADULT - SUBJECTIVE AND OBJECTIVE BOX
INTERVAL HPI/OVERNIGHT EVENTS:  Patient was seen and examined. Patient complaining of RUE pain however states pain improved after new pain regimen. ROS otherwise negative.     Vital Signs Last 24 Hrs  T(C): 36.7 (26 Aug 2022 10:15), Max: 37.1 (25 Aug 2022 21:17)  T(F): 98 (26 Aug 2022 10:15), Max: 98.8 (25 Aug 2022 21:17)  HR: 88 (26 Aug 2022 10:15) (67 - 88)  BP: 119/78 (26 Aug 2022 10:15) (119/78 - 142/81)  BP(mean): --  RR: 16 (26 Aug 2022 10:15) (16 - 18)  SpO2: 95% (26 Aug 2022 10:15) (95% - 97%)    Parameters below as of 26 Aug 2022 10:15  Patient On (Oxygen Delivery Method): room air    CAPILLARY BLOOD GLUCOSE  POCT Blood Glucose.: 106 mg/dL (26 Aug 2022 12:13)  POCT Blood Glucose.: 159 mg/dL (26 Aug 2022 08:44)  POCT Blood Glucose.: 225 mg/dL (25 Aug 2022 22:06)  POCT Blood Glucose.: 150 mg/dL (25 Aug 2022 17:18)    PHYSICAL EXAM:  Constitutional: mild distress 2/2 to pain  HEENT: eyes red bilaterally (per patient history of dry eye), neck supple, no masses, no JVD, MMM   Respiratory: CTA b/l, good air entry b/l, no wheezing, no rhonchi, no rales, without accessory muscle use and no intercostal retractions  Cardiovascular: RRR, normal S1S2, no M/R/G  Gastrointestinal: soft, NTND, no masses palpable, BS normal  MSK: no tenderness to palpation of cervical spine or paraspinal cervical spine, + pain RUE w/ active and passive range of motion from neck to hand, + mild swelling right hand  Extremities: Warm, well perfused, pulses equal bilateral upper and lower extremities, no edema, no clubbing  Neurological: AAOx3, CN Grossly intact  Skin: Normal temperature, warm, dry    MEDICATIONS  (STANDING):  acetaminophen     Tablet .. 650 milliGRAM(s) Oral every 6 hours  amLODIPine   Tablet 10 milliGRAM(s) Oral daily  aspirin enteric coated 81 milliGRAM(s) Oral daily  atorvastatin 20 milliGRAM(s) Oral at bedtime  cyclobenzaprine 10 milliGRAM(s) Oral three times a day  dextrose 5%. 1000 milliLiter(s) (100 mL/Hr) IV Continuous <Continuous>  dextrose 5%. 1000 milliLiter(s) (50 mL/Hr) IV Continuous <Continuous>  dextrose 50% Injectable 25 Gram(s) IV Push once  dextrose 50% Injectable 12.5 Gram(s) IV Push once  dextrose 50% Injectable 25 Gram(s) IV Push once  enoxaparin Injectable 40 milliGRAM(s) SubCutaneous every 24 hours  gabapentin 300 milliGRAM(s) Oral three times a day  glucagon  Injectable 1 milliGRAM(s) IntraMuscular once  ibuprofen  Tablet. 600 milliGRAM(s) Oral every 6 hours  insulin lispro (ADMELOG) corrective regimen sliding scale   SubCutaneous three times a day before meals  insulin lispro (ADMELOG) corrective regimen sliding scale   SubCutaneous at bedtime  lidocaine   4% Patch 1 Patch Transdermal every 24 hours  lisinopril 10 milliGRAM(s) Oral daily  sodium chloride 0.9%. 1000 milliLiter(s) (100 mL/Hr) IV Continuous <Continuous>    MEDICATIONS  (PRN):  artificial tears (preservative free) Ophthalmic Solution 1 Drop(s) Both EYES four times a day PRN Dry Eyes  dextrose Oral Gel 15 Gram(s) Oral once PRN Blood Glucose LESS THAN 70 milliGRAM(s)/deciliter  melatonin 3 milliGRAM(s) Oral at bedtime PRN Insomnia        Allergies    No Known Allergies    Intolerances    LABS:                         16.5   8.12  )-----------( 287      ( 26 Aug 2022 05:50 )             46.7     08-26    143  |  108<H>  |  14  ----------------------------<  133<H>  4.5   |  25  |  1.05    Ca    9.8      26 Aug 2022 05:50  Phos  3.3     08-26  Mg     2.20     08-26                    RADIOLOGY, EKG & ADDITIONAL TESTS: Reviewed.     CT head + CT neck:   BRAIN CT :Normal non-contrast CT of the brain.  CERVICAL SPINE CT: Normal exam. No acute fracture or traumatic   subluxation. Recommend MR for more complete evaluation as clinically   warranted.    ECHO:   1. Mitral annular calcification, otherwise normal mitral  valve. Minimal mitral regurgitation.  2. Normal left ventricular internal dimensions and wall  thicknesses.  3. Normal left ventricular systolic function. No segmental  wall motion abnormalities.    Stress Test:   * Myocardial Perfusion SPECT results are normal.  * Review of raw data shows: The study is of good technical  quality.  * The left ventricle was normal in size. Normal myocardial  perfusion scan,with no evidence of infarction or inducible  ischemia.  There is a medium sized, mild-moderate defect  in the basal inferior wall that corrects with prone  imaging suggestive of attenuation artifact.  * Post-stress gated wall motion analysis was performed  (LVEF > 70%;LVEDV = 92 ml.), revealing normal LV function.  *** Compared with Nuclear/Stress test of 10/31/2017, no  significant changes noted.  4. Normal right ventricular size and function.        RADIOLOGY & ADDITIONAL TESTS:  Reviewed

## 2022-08-27 LAB
ANION GAP SERPL CALC-SCNC: 11 MMOL/L — SIGNIFICANT CHANGE UP (ref 7–14)
BUN SERPL-MCNC: 10 MG/DL — SIGNIFICANT CHANGE UP (ref 7–23)
CALCIUM SERPL-MCNC: 9.5 MG/DL — SIGNIFICANT CHANGE UP (ref 8.4–10.5)
CHLORIDE SERPL-SCNC: 105 MMOL/L — SIGNIFICANT CHANGE UP (ref 98–107)
CO2 SERPL-SCNC: 24 MMOL/L — SIGNIFICANT CHANGE UP (ref 22–31)
CREAT SERPL-MCNC: 1.08 MG/DL — SIGNIFICANT CHANGE UP (ref 0.5–1.3)
EGFR: 78 ML/MIN/1.73M2 — SIGNIFICANT CHANGE UP
GLUCOSE BLDC GLUCOMTR-MCNC: 171 MG/DL — HIGH (ref 70–99)
GLUCOSE BLDC GLUCOMTR-MCNC: 177 MG/DL — HIGH (ref 70–99)
GLUCOSE BLDC GLUCOMTR-MCNC: 199 MG/DL — HIGH (ref 70–99)
GLUCOSE BLDC GLUCOMTR-MCNC: 207 MG/DL — HIGH (ref 70–99)
GLUCOSE SERPL-MCNC: 187 MG/DL — HIGH (ref 70–99)
MAGNESIUM SERPL-MCNC: 2.2 MG/DL — SIGNIFICANT CHANGE UP (ref 1.6–2.6)
PHOSPHATE SERPL-MCNC: 3.4 MG/DL — SIGNIFICANT CHANGE UP (ref 2.5–4.5)
POTASSIUM SERPL-MCNC: 4.2 MMOL/L — SIGNIFICANT CHANGE UP (ref 3.5–5.3)
POTASSIUM SERPL-SCNC: 4.2 MMOL/L — SIGNIFICANT CHANGE UP (ref 3.5–5.3)
SODIUM SERPL-SCNC: 140 MMOL/L — SIGNIFICANT CHANGE UP (ref 135–145)

## 2022-08-27 PROCEDURE — 99232 SBSQ HOSP IP/OBS MODERATE 35: CPT

## 2022-08-27 RX ADMIN — LISINOPRIL 10 MILLIGRAM(S): 2.5 TABLET ORAL at 05:13

## 2022-08-27 RX ADMIN — LIDOCAINE 1 PATCH: 4 CREAM TOPICAL at 20:12

## 2022-08-27 RX ADMIN — Medication 1: at 08:42

## 2022-08-27 RX ADMIN — AMLODIPINE BESYLATE 10 MILLIGRAM(S): 2.5 TABLET ORAL at 05:13

## 2022-08-27 RX ADMIN — GABAPENTIN 300 MILLIGRAM(S): 400 CAPSULE ORAL at 05:13

## 2022-08-27 RX ADMIN — Medication 81 MILLIGRAM(S): at 11:47

## 2022-08-27 RX ADMIN — Medication 600 MILLIGRAM(S): at 11:47

## 2022-08-27 RX ADMIN — Medication 650 MILLIGRAM(S): at 11:47

## 2022-08-27 RX ADMIN — ENOXAPARIN SODIUM 40 MILLIGRAM(S): 100 INJECTION SUBCUTANEOUS at 17:54

## 2022-08-27 RX ADMIN — GABAPENTIN 300 MILLIGRAM(S): 400 CAPSULE ORAL at 15:31

## 2022-08-27 RX ADMIN — CYCLOBENZAPRINE HYDROCHLORIDE 10 MILLIGRAM(S): 10 TABLET, FILM COATED ORAL at 22:34

## 2022-08-27 RX ADMIN — Medication 1: at 11:47

## 2022-08-27 RX ADMIN — LIDOCAINE 1 PATCH: 4 CREAM TOPICAL at 05:19

## 2022-08-27 RX ADMIN — LIDOCAINE 1 PATCH: 4 CREAM TOPICAL at 17:08

## 2022-08-27 RX ADMIN — Medication 650 MILLIGRAM(S): at 05:12

## 2022-08-27 RX ADMIN — GABAPENTIN 300 MILLIGRAM(S): 400 CAPSULE ORAL at 22:34

## 2022-08-27 RX ADMIN — Medication 1: at 17:07

## 2022-08-27 RX ADMIN — ATORVASTATIN CALCIUM 20 MILLIGRAM(S): 80 TABLET, FILM COATED ORAL at 22:34

## 2022-08-27 RX ADMIN — Medication 600 MILLIGRAM(S): at 05:13

## 2022-08-27 RX ADMIN — CYCLOBENZAPRINE HYDROCHLORIDE 10 MILLIGRAM(S): 10 TABLET, FILM COATED ORAL at 15:31

## 2022-08-27 RX ADMIN — CYCLOBENZAPRINE HYDROCHLORIDE 10 MILLIGRAM(S): 10 TABLET, FILM COATED ORAL at 05:12

## 2022-08-27 NOTE — PROGRESS NOTE ADULT - SUBJECTIVE AND OBJECTIVE BOX
[facility] Division of Hospital Medicine  Wade VillanuevaTatyana Murcia MD  Pager [phone]    SUBJECTIVE:  Chief complaint: shoulder pain, right.      ROS: All systems negative except as noted.      Vital Signs Last 24 Hrs  T(C): 36.8 (27 Aug 2022 11:30), Max: 37.1 (26 Aug 2022 21:37)  T(F): 98.2 (27 Aug 2022 11:30), Max: 98.7 (26 Aug 2022 21:37)  HR: 81 (27 Aug 2022 11:30) (68 - 99)  BP: 112/72 (27 Aug 2022 11:30) (103/68 - 127/83)  BP(mean): --  RR: 18 (27 Aug 2022 11:30) (17 - 18)  SpO2: 99% (27 Aug 2022 11:30) (94% - 100%)    Parameters below as of 27 Aug 2022 05:12  Patient On (Oxygen Delivery Method): room air          PHYSICAL EXAM:              MEDICATION:  MEDICATIONS  (STANDING):  amLODIPine   Tablet 10 milliGRAM(s) Oral daily  aspirin enteric coated 81 milliGRAM(s) Oral daily  atorvastatin 20 milliGRAM(s) Oral at bedtime  cyclobenzaprine 10 milliGRAM(s) Oral three times a day  dextrose 5%. 1000 milliLiter(s) (100 mL/Hr) IV Continuous <Continuous>  dextrose 5%. 1000 milliLiter(s) (50 mL/Hr) IV Continuous <Continuous>  dextrose 50% Injectable 25 Gram(s) IV Push once  dextrose 50% Injectable 12.5 Gram(s) IV Push once  dextrose 50% Injectable 25 Gram(s) IV Push once  enoxaparin Injectable 40 milliGRAM(s) SubCutaneous every 24 hours  gabapentin 300 milliGRAM(s) Oral three times a day  glucagon  Injectable 1 milliGRAM(s) IntraMuscular once  insulin lispro (ADMELOG) corrective regimen sliding scale   SubCutaneous three times a day before meals  insulin lispro (ADMELOG) corrective regimen sliding scale   SubCutaneous at bedtime  lidocaine   4% Patch 1 Patch Transdermal every 24 hours  lisinopril 10 milliGRAM(s) Oral daily  sodium chloride 0.9%. 1000 milliLiter(s) (100 mL/Hr) IV Continuous <Continuous>    MEDICATIONS  (PRN):  artificial tears (preservative free) Ophthalmic Solution 1 Drop(s) Both EYES four times a day PRN Dry Eyes  dextrose Oral Gel 15 Gram(s) Oral once PRN Blood Glucose LESS THAN 70 milliGRAM(s)/deciliter  melatonin 3 milliGRAM(s) Oral at bedtime PRN Insomnia            LABORATORY:                          16.5   8.12  )-----------( 287      ( 26 Aug 2022 05:50 )             46.7     08-27    140  |  105  |  10  ----------------------------<  187<H>  4.2   |  24  |  1.08    Ca    9.5      27 Aug 2022 07:32  Phos  3.4     08-27  Mg     2.20     08-27                             [facility] Division of Hospital Medicine  Wade Villanueva) MD [place]  Pager [phone]    SUBJECTIVE:  Chief complaint: shoulder pain, right.    Pt seen and evaluated at bedside. Continues to have right shoulder pain. Tolerating PO. no o/n events.      ROS: All systems negative except as noted.      Vital Signs Last 24 Hrs  T(C): 36.8 (27 Aug 2022 11:30), Max: 37.1 (26 Aug 2022 21:37)  T(F): 98.2 (27 Aug 2022 11:30), Max: 98.7 (26 Aug 2022 21:37)  HR: 81 (27 Aug 2022 11:30) (68 - 99)  BP: 112/72 (27 Aug 2022 11:30) (103/68 - 127/83)  BP(mean): --  RR: 18 (27 Aug 2022 11:30) (17 - 18)  SpO2: 99% (27 Aug 2022 11:30) (94% - 100%)    Parameters below as of 27 Aug 2022 05:12  Patient On (Oxygen Delivery Method): room air          PHYSICAL EXAM:  Gen- In bed, comfortable, NAD  Resp- CTAB, good effort. No r/r/w. No accessory muscle use.  CVS- RRR, S1S2, no g/r/m. No LE edema.  GI- Soft abd, NT, ND, +BSx4. No HSM.  MSK- No C/C. ROM intact. No crepitus.  Neuro- CN II-XII intact. Speech fluent/face symmetric. Sensation intact. Strength UE 5/5 on left. 4/5 on right.  Skin- No rashes/ulcers. Warm/moist.  Psych- AAOx3. Appropriate mood/affect.      MEDICATION:  MEDICATIONS  (STANDING):  amLODIPine   Tablet 10 milliGRAM(s) Oral daily  aspirin enteric coated 81 milliGRAM(s) Oral daily  atorvastatin 20 milliGRAM(s) Oral at bedtime  cyclobenzaprine 10 milliGRAM(s) Oral three times a day  dextrose 5%. 1000 milliLiter(s) (100 mL/Hr) IV Continuous <Continuous>  dextrose 5%. 1000 milliLiter(s) (50 mL/Hr) IV Continuous <Continuous>  dextrose 50% Injectable 25 Gram(s) IV Push once  dextrose 50% Injectable 12.5 Gram(s) IV Push once  dextrose 50% Injectable 25 Gram(s) IV Push once  enoxaparin Injectable 40 milliGRAM(s) SubCutaneous every 24 hours  gabapentin 300 milliGRAM(s) Oral three times a day  glucagon  Injectable 1 milliGRAM(s) IntraMuscular once  insulin lispro (ADMELOG) corrective regimen sliding scale   SubCutaneous three times a day before meals  insulin lispro (ADMELOG) corrective regimen sliding scale   SubCutaneous at bedtime  lidocaine   4% Patch 1 Patch Transdermal every 24 hours  lisinopril 10 milliGRAM(s) Oral daily  sodium chloride 0.9%. 1000 milliLiter(s) (100 mL/Hr) IV Continuous <Continuous>    MEDICATIONS  (PRN):  artificial tears (preservative free) Ophthalmic Solution 1 Drop(s) Both EYES four times a day PRN Dry Eyes  dextrose Oral Gel 15 Gram(s) Oral once PRN Blood Glucose LESS THAN 70 milliGRAM(s)/deciliter  melatonin 3 milliGRAM(s) Oral at bedtime PRN Insomnia            LABORATORY:                          16.5   8.12  )-----------( 287      ( 26 Aug 2022 05:50 )             46.7     08-27    140  |  105  |  10  ----------------------------<  187<H>  4.2   |  24  |  1.08    Ca    9.5      27 Aug 2022 07:32  Phos  3.4     08-27  Mg     2.20     08-27

## 2022-08-27 NOTE — PROGRESS NOTE ADULT - SUBJECTIVE AND OBJECTIVE BOX
DATE OF SERVICE: 08-27-22 @ 11:57    Subjective: Patient seen and examined. No new events except as noted.     SUBJECTIVE/ROS:  Pt seen and examined early this am       MEDICATIONS:  MEDICATIONS  (STANDING):  amLODIPine   Tablet 10 milliGRAM(s) Oral daily  aspirin enteric coated 81 milliGRAM(s) Oral daily  atorvastatin 20 milliGRAM(s) Oral at bedtime  cyclobenzaprine 10 milliGRAM(s) Oral three times a day  dextrose 5%. 1000 milliLiter(s) (100 mL/Hr) IV Continuous <Continuous>  dextrose 5%. 1000 milliLiter(s) (50 mL/Hr) IV Continuous <Continuous>  dextrose 50% Injectable 25 Gram(s) IV Push once  dextrose 50% Injectable 12.5 Gram(s) IV Push once  dextrose 50% Injectable 25 Gram(s) IV Push once  enoxaparin Injectable 40 milliGRAM(s) SubCutaneous every 24 hours  gabapentin 300 milliGRAM(s) Oral three times a day  glucagon  Injectable 1 milliGRAM(s) IntraMuscular once  insulin lispro (ADMELOG) corrective regimen sliding scale   SubCutaneous three times a day before meals  insulin lispro (ADMELOG) corrective regimen sliding scale   SubCutaneous at bedtime  lidocaine   4% Patch 1 Patch Transdermal every 24 hours  lisinopril 10 milliGRAM(s) Oral daily  sodium chloride 0.9%. 1000 milliLiter(s) (100 mL/Hr) IV Continuous <Continuous>      PHYSICAL EXAM:  T(C): 36.8 (08-27-22 @ 11:30), Max: 37.1 (08-26-22 @ 21:37)  HR: 81 (08-27-22 @ 11:30) (68 - 99)  BP: 112/72 (08-27-22 @ 11:30) (103/68 - 127/83)  RR: 18 (08-27-22 @ 11:30) (17 - 18)  SpO2: 99% (08-27-22 @ 11:30) (94% - 100%)  Wt(kg): --  I&O's Summary          JVP: Normal  Neck: supple  Lung: clear   CV: S1 S2 , Murmur:  Abd: soft  Ext: No edema  neuro: Awake / alert  Psych: flat affect  Skin: normal``    LABS/DATA:    CARDIAC MARKERS:                                16.5   8.12  )-----------( 287      ( 26 Aug 2022 05:50 )             46.7     08-27    140  |  105  |  10  ----------------------------<  187<H>  4.2   |  24  |  1.08    Ca    9.5      27 Aug 2022 07:32  Phos  3.4     08-27  Mg     2.20     08-27      proBNP:   Lipid Profile:   HgA1c:   TSH:     TELE:  EKG:

## 2022-08-28 LAB
GLUCOSE BLDC GLUCOMTR-MCNC: 150 MG/DL — HIGH (ref 70–99)
GLUCOSE BLDC GLUCOMTR-MCNC: 165 MG/DL — HIGH (ref 70–99)
GLUCOSE BLDC GLUCOMTR-MCNC: 195 MG/DL — HIGH (ref 70–99)
GLUCOSE BLDC GLUCOMTR-MCNC: 228 MG/DL — HIGH (ref 70–99)

## 2022-08-28 PROCEDURE — 99232 SBSQ HOSP IP/OBS MODERATE 35: CPT

## 2022-08-28 PROCEDURE — 72156 MRI NECK SPINE W/O & W/DYE: CPT | Mod: 26

## 2022-08-28 RX ADMIN — CYCLOBENZAPRINE HYDROCHLORIDE 10 MILLIGRAM(S): 10 TABLET, FILM COATED ORAL at 23:34

## 2022-08-28 RX ADMIN — ATORVASTATIN CALCIUM 20 MILLIGRAM(S): 80 TABLET, FILM COATED ORAL at 23:16

## 2022-08-28 RX ADMIN — CYCLOBENZAPRINE HYDROCHLORIDE 10 MILLIGRAM(S): 10 TABLET, FILM COATED ORAL at 06:09

## 2022-08-28 RX ADMIN — Medication 1: at 08:27

## 2022-08-28 RX ADMIN — LIDOCAINE 1 PATCH: 4 CREAM TOPICAL at 17:28

## 2022-08-28 RX ADMIN — LISINOPRIL 10 MILLIGRAM(S): 2.5 TABLET ORAL at 06:10

## 2022-08-28 RX ADMIN — LIDOCAINE 1 PATCH: 4 CREAM TOPICAL at 05:38

## 2022-08-28 RX ADMIN — LIDOCAINE 1 PATCH: 4 CREAM TOPICAL at 17:22

## 2022-08-28 RX ADMIN — GABAPENTIN 300 MILLIGRAM(S): 400 CAPSULE ORAL at 23:16

## 2022-08-28 RX ADMIN — ENOXAPARIN SODIUM 40 MILLIGRAM(S): 100 INJECTION SUBCUTANEOUS at 17:27

## 2022-08-28 RX ADMIN — Medication 1: at 17:21

## 2022-08-28 RX ADMIN — Medication 81 MILLIGRAM(S): at 12:49

## 2022-08-28 RX ADMIN — AMLODIPINE BESYLATE 10 MILLIGRAM(S): 2.5 TABLET ORAL at 06:09

## 2022-08-28 RX ADMIN — GABAPENTIN 300 MILLIGRAM(S): 400 CAPSULE ORAL at 06:09

## 2022-08-28 RX ADMIN — GABAPENTIN 300 MILLIGRAM(S): 400 CAPSULE ORAL at 15:44

## 2022-08-28 RX ADMIN — CYCLOBENZAPRINE HYDROCHLORIDE 10 MILLIGRAM(S): 10 TABLET, FILM COATED ORAL at 15:44

## 2022-08-28 NOTE — PROGRESS NOTE ADULT - PROBLEM SELECTOR PLAN 8
Lovenox for DVT prevention.    Case Discussed w/ ACP and Patient    Dispo- Pending imaging MRi Lovenox for DVT prevention.    Case Discussed w/ ACP and Patient    Dispo- Pending imaging MRI

## 2022-08-28 NOTE — PROGRESS NOTE ADULT - SUBJECTIVE AND OBJECTIVE BOX
DATE OF SERVICE: 08-28-22 @ 08:16    Subjective: Patient seen and examined. No new events except as noted.     SUBJECTIVE/ROS:  MEDICATIONS:  MEDICATIONS  (STANDING):  amLODIPine   Tablet 10 milliGRAM(s) Oral daily  aspirin enteric coated 81 milliGRAM(s) Oral daily  atorvastatin 20 milliGRAM(s) Oral at bedtime  cyclobenzaprine 10 milliGRAM(s) Oral three times a day  dextrose 5%. 1000 milliLiter(s) (50 mL/Hr) IV Continuous <Continuous>  dextrose 5%. 1000 milliLiter(s) (100 mL/Hr) IV Continuous <Continuous>  dextrose 50% Injectable 25 Gram(s) IV Push once  dextrose 50% Injectable 12.5 Gram(s) IV Push once  dextrose 50% Injectable 25 Gram(s) IV Push once  enoxaparin Injectable 40 milliGRAM(s) SubCutaneous every 24 hours  gabapentin 300 milliGRAM(s) Oral three times a day  glucagon  Injectable 1 milliGRAM(s) IntraMuscular once  insulin lispro (ADMELOG) corrective regimen sliding scale   SubCutaneous three times a day before meals  insulin lispro (ADMELOG) corrective regimen sliding scale   SubCutaneous at bedtime  lidocaine   4% Patch 1 Patch Transdermal every 24 hours  lisinopril 10 milliGRAM(s) Oral daily      PHYSICAL EXAM:  T(C): 37.1 (08-28-22 @ 06:09), Max: 37.1 (08-28-22 @ 06:09)  HR: 73 (08-28-22 @ 06:09) (71 - 81)  BP: 119/82 (08-28-22 @ 06:09) (112/72 - 138/78)  RR: 18 (08-28-22 @ 06:09) (17 - 18)  SpO2: 94% (08-28-22 @ 06:09) (94% - 100%)  Wt(kg): --  I&O's Summary          JVP: Normal  Neck: supple  Lung: clear   CV: S1 S2 , Murmur:  Abd: soft  Ext: No edema  neuro: Awake / alert  Psych: flat affect  Skin: normal``    LABS/DATA:    CARDIAC MARKERS:            08-27    140  |  105  |  10  ----------------------------<  187<H>  4.2   |  24  |  1.08    Ca    9.5      27 Aug 2022 07:32  Phos  3.4     08-27  Mg     2.20     08-27      proBNP:   Lipid Profile:   HgA1c:   TSH:     TELE:  EKG:

## 2022-08-28 NOTE — PROGRESS NOTE ADULT - SUBJECTIVE AND OBJECTIVE BOX
[facility] Division of Hospital Medicine  Wade GATES ([place]) MD [place]  Pager [phone]    SUBJECTIVE:  Chief complaint: ________.      ROS: All systems negative except as noted.      Vital Signs Last 24 Hrs  T(C): 36.7 (28 Aug 2022 10:00), Max: 37.1 (28 Aug 2022 06:09)  T(F): 98 (28 Aug 2022 10:00), Max: 98.8 (28 Aug 2022 06:09)  HR: 92 (28 Aug 2022 10:00) (71 - 92)  BP: 121/78 (28 Aug 2022 10:00) (119/82 - 138/78)  BP(mean): --  RR: 16 (28 Aug 2022 10:00) (16 - 18)  SpO2: 94% (28 Aug 2022 10:00) (94% - 100%)    Parameters below as of 28 Aug 2022 10:00  Patient On (Oxygen Delivery Method): room air          PHYSICAL EXAM:  Gen- In bed, comfortable, NAD  Resp- CTAB, good effort. No r/r/w. No accessory muscle use.  CVS- RRR, S1S2, no g/r/m. No LE edema.  GI- Soft abd, NT, ND, +BSx4. No HSM.  MSK- No C/C. ROM intact. No crepitus.  Neuro- CN II-XII intact. Speech fluent/face symmetric. Sensation intact. Strength UE 5/5 on left. 4/5 on right.  Skin- No rashes/ulcers. Warm/moist.  Psych- AAOx3. Appropriate mood/affect.        MEDICATION:  MEDICATIONS  (STANDING):  amLODIPine   Tablet 10 milliGRAM(s) Oral daily  aspirin enteric coated 81 milliGRAM(s) Oral daily  atorvastatin 20 milliGRAM(s) Oral at bedtime  cyclobenzaprine 10 milliGRAM(s) Oral three times a day  dextrose 5%. 1000 milliLiter(s) (100 mL/Hr) IV Continuous <Continuous>  dextrose 5%. 1000 milliLiter(s) (50 mL/Hr) IV Continuous <Continuous>  dextrose 50% Injectable 25 Gram(s) IV Push once  dextrose 50% Injectable 12.5 Gram(s) IV Push once  dextrose 50% Injectable 25 Gram(s) IV Push once  enoxaparin Injectable 40 milliGRAM(s) SubCutaneous every 24 hours  gabapentin 300 milliGRAM(s) Oral three times a day  glucagon  Injectable 1 milliGRAM(s) IntraMuscular once  insulin lispro (ADMELOG) corrective regimen sliding scale   SubCutaneous three times a day before meals  insulin lispro (ADMELOG) corrective regimen sliding scale   SubCutaneous at bedtime  lidocaine   4% Patch 1 Patch Transdermal every 24 hours  lisinopril 10 milliGRAM(s) Oral daily    MEDICATIONS  (PRN):  artificial tears (preservative free) Ophthalmic Solution 1 Drop(s) Both EYES four times a day PRN Dry Eyes  dextrose Oral Gel 15 Gram(s) Oral once PRN Blood Glucose LESS THAN 70 milliGRAM(s)/deciliter  melatonin 3 milliGRAM(s) Oral at bedtime PRN Insomnia            LABORATORY:  No new labs                       [facility] Division of Hospital Medicine  Wade Villanueva) MD [place]  Pager [phone]    SUBJECTIVE:  Chief complaint: Shoulder pain.    Pt seen and evaluated. No o/n events. Feels the same. Similar radicular pain on right shoulder.       ROS: All systems negative except as noted.      Vital Signs Last 24 Hrs  T(C): 36.7 (28 Aug 2022 10:00), Max: 37.1 (28 Aug 2022 06:09)  T(F): 98 (28 Aug 2022 10:00), Max: 98.8 (28 Aug 2022 06:09)  HR: 92 (28 Aug 2022 10:00) (71 - 92)  BP: 121/78 (28 Aug 2022 10:00) (119/82 - 138/78)  BP(mean): --  RR: 16 (28 Aug 2022 10:00) (16 - 18)  SpO2: 94% (28 Aug 2022 10:00) (94% - 100%)    Parameters below as of 28 Aug 2022 10:00  Patient On (Oxygen Delivery Method): room air          PHYSICAL EXAM:  Gen- In bed, comfortable, NAD  Resp- CTAB, good effort. No r/r/w. No accessory muscle use.  CVS- RRR, S1S2, no g/r/m. No LE edema.  GI- Soft abd, NT, ND, +BSx4. No HSM.  MSK- No C/C. ROM intact. No crepitus.  Neuro- CN II-XII intact. Speech fluent/face symmetric. Sensation intact. Strength UE 5/5 on left. 4/5 on right.  Skin- No rashes/ulcers. Warm/moist.  Psych- AAOx3. Appropriate mood/affect.        MEDICATION:  MEDICATIONS  (STANDING):  amLODIPine   Tablet 10 milliGRAM(s) Oral daily  aspirin enteric coated 81 milliGRAM(s) Oral daily  atorvastatin 20 milliGRAM(s) Oral at bedtime  cyclobenzaprine 10 milliGRAM(s) Oral three times a day  dextrose 5%. 1000 milliLiter(s) (100 mL/Hr) IV Continuous <Continuous>  dextrose 5%. 1000 milliLiter(s) (50 mL/Hr) IV Continuous <Continuous>  dextrose 50% Injectable 25 Gram(s) IV Push once  dextrose 50% Injectable 12.5 Gram(s) IV Push once  dextrose 50% Injectable 25 Gram(s) IV Push once  enoxaparin Injectable 40 milliGRAM(s) SubCutaneous every 24 hours  gabapentin 300 milliGRAM(s) Oral three times a day  glucagon  Injectable 1 milliGRAM(s) IntraMuscular once  insulin lispro (ADMELOG) corrective regimen sliding scale   SubCutaneous three times a day before meals  insulin lispro (ADMELOG) corrective regimen sliding scale   SubCutaneous at bedtime  lidocaine   4% Patch 1 Patch Transdermal every 24 hours  lisinopril 10 milliGRAM(s) Oral daily    MEDICATIONS  (PRN):  artificial tears (preservative free) Ophthalmic Solution 1 Drop(s) Both EYES four times a day PRN Dry Eyes  dextrose Oral Gel 15 Gram(s) Oral once PRN Blood Glucose LESS THAN 70 milliGRAM(s)/deciliter  melatonin 3 milliGRAM(s) Oral at bedtime PRN Insomnia            LABORATORY:  No new labs

## 2022-08-29 LAB
GLUCOSE BLDC GLUCOMTR-MCNC: 209 MG/DL — HIGH (ref 70–99)
GLUCOSE BLDC GLUCOMTR-MCNC: 226 MG/DL — HIGH (ref 70–99)
GLUCOSE BLDC GLUCOMTR-MCNC: 282 MG/DL — HIGH (ref 70–99)
GLUCOSE BLDC GLUCOMTR-MCNC: 289 MG/DL — HIGH (ref 70–99)

## 2022-08-29 PROCEDURE — 99233 SBSQ HOSP IP/OBS HIGH 50: CPT

## 2022-08-29 RX ADMIN — LISINOPRIL 10 MILLIGRAM(S): 2.5 TABLET ORAL at 05:02

## 2022-08-29 RX ADMIN — Medication 2: at 08:23

## 2022-08-29 RX ADMIN — LIDOCAINE 1 PATCH: 4 CREAM TOPICAL at 05:04

## 2022-08-29 RX ADMIN — GABAPENTIN 300 MILLIGRAM(S): 400 CAPSULE ORAL at 23:05

## 2022-08-29 RX ADMIN — GABAPENTIN 300 MILLIGRAM(S): 400 CAPSULE ORAL at 05:01

## 2022-08-29 RX ADMIN — GABAPENTIN 300 MILLIGRAM(S): 400 CAPSULE ORAL at 14:15

## 2022-08-29 RX ADMIN — Medication 3: at 12:21

## 2022-08-29 RX ADMIN — CYCLOBENZAPRINE HYDROCHLORIDE 10 MILLIGRAM(S): 10 TABLET, FILM COATED ORAL at 05:02

## 2022-08-29 RX ADMIN — AMLODIPINE BESYLATE 10 MILLIGRAM(S): 2.5 TABLET ORAL at 05:02

## 2022-08-29 RX ADMIN — CYCLOBENZAPRINE HYDROCHLORIDE 10 MILLIGRAM(S): 10 TABLET, FILM COATED ORAL at 14:14

## 2022-08-29 RX ADMIN — Medication 81 MILLIGRAM(S): at 14:14

## 2022-08-29 RX ADMIN — ATORVASTATIN CALCIUM 20 MILLIGRAM(S): 80 TABLET, FILM COATED ORAL at 23:05

## 2022-08-29 RX ADMIN — CYCLOBENZAPRINE HYDROCHLORIDE 10 MILLIGRAM(S): 10 TABLET, FILM COATED ORAL at 23:05

## 2022-08-29 RX ADMIN — LIDOCAINE 1 PATCH: 4 CREAM TOPICAL at 17:14

## 2022-08-29 RX ADMIN — Medication 3: at 17:13

## 2022-08-29 NOTE — PROGRESS NOTE ADULT - PROBLEM SELECTOR PLAN 8
Lovenox for DVT prevention.    Case Discussed w/ ACP and Patient    Dispo- Pending MRI sholder, should be done tonight per MRI

## 2022-08-29 NOTE — PROGRESS NOTE ADULT - SUBJECTIVE AND OBJECTIVE BOX
DATE OF SERVICE: 08-29-22 @ 09:27    Subjective: Patient seen and examined. No new events except as noted.     SUBJECTIVE/ROS:  feels ok   still has right shoulder pain       MEDICATIONS:  MEDICATIONS  (STANDING):  amLODIPine   Tablet 10 milliGRAM(s) Oral daily  aspirin enteric coated 81 milliGRAM(s) Oral daily  atorvastatin 20 milliGRAM(s) Oral at bedtime  cyclobenzaprine 10 milliGRAM(s) Oral three times a day  dextrose 5%. 1000 milliLiter(s) (100 mL/Hr) IV Continuous <Continuous>  dextrose 5%. 1000 milliLiter(s) (50 mL/Hr) IV Continuous <Continuous>  dextrose 50% Injectable 25 Gram(s) IV Push once  dextrose 50% Injectable 12.5 Gram(s) IV Push once  dextrose 50% Injectable 25 Gram(s) IV Push once  enoxaparin Injectable 40 milliGRAM(s) SubCutaneous every 24 hours  gabapentin 300 milliGRAM(s) Oral three times a day  glucagon  Injectable 1 milliGRAM(s) IntraMuscular once  insulin lispro (ADMELOG) corrective regimen sliding scale   SubCutaneous three times a day before meals  insulin lispro (ADMELOG) corrective regimen sliding scale   SubCutaneous at bedtime  lidocaine   4% Patch 1 Patch Transdermal every 24 hours  lisinopril 10 milliGRAM(s) Oral daily      PHYSICAL EXAM:  T(C): 37 (08-29-22 @ 05:00), Max: 37 (08-29-22 @ 05:00)  HR: 72 (08-29-22 @ 05:00) (72 - 92)  BP: 120/79 (08-29-22 @ 05:00) (117/73 - 121/78)  RR: 18 (08-29-22 @ 05:00) (16 - 19)  SpO2: 98% (08-29-22 @ 05:00) (94% - 98%)  Wt(kg): --  I&O's Summary          JVP: Normal  Neck: supple  Lung: clear   CV: S1 S2 , Murmur:  Abd: soft  Ext: No edema  neuro: Awake / alert  Psych: flat affect  Skin: normal``    LABS/DATA:    CARDIAC MARKERS:                  proBNP:   Lipid Profile:   HgA1c:   TSH:     TELE:  EKG:

## 2022-08-29 NOTE — PROGRESS NOTE ADULT - SUBJECTIVE AND OBJECTIVE BOX
Patient is a 62y old  Male who presents with a chief complaint of Chest pain (29 Aug 2022 09:27)    Jesu Sanders MD   [facility] Division of Hospital Medicine   Pager [phone]  Reachable on [employer] Teams     SUBJECTIVE / OVERNIGHT EVENTS:  Patient seen and examined this morning. No events overnight. Still having pain when moving his right arm, but not having any pain at rest.   Has been walking around unit. Denies any weakness. Has been having normal bowel movements.     MEDICATIONS  (STANDING):  amLODIPine   Tablet 10 milliGRAM(s) Oral daily  aspirin enteric coated 81 milliGRAM(s) Oral daily  atorvastatin 20 milliGRAM(s) Oral at bedtime  cyclobenzaprine 10 milliGRAM(s) Oral three times a day  dextrose 5%. 1000 milliLiter(s) (100 mL/Hr) IV Continuous <Continuous>  dextrose 5%. 1000 milliLiter(s) (50 mL/Hr) IV Continuous <Continuous>  dextrose 50% Injectable 25 Gram(s) IV Push once  dextrose 50% Injectable 12.5 Gram(s) IV Push once  dextrose 50% Injectable 25 Gram(s) IV Push once  enoxaparin Injectable 40 milliGRAM(s) SubCutaneous every 24 hours  gabapentin 300 milliGRAM(s) Oral three times a day  glucagon  Injectable 1 milliGRAM(s) IntraMuscular once  insulin lispro (ADMELOG) corrective regimen sliding scale   SubCutaneous three times a day before meals  insulin lispro (ADMELOG) corrective regimen sliding scale   SubCutaneous at bedtime  lidocaine   4% Patch 1 Patch Transdermal every 24 hours  lisinopril 10 milliGRAM(s) Oral daily    MEDICATIONS  (PRN):  artificial tears (preservative free) Ophthalmic Solution 1 Drop(s) Both EYES four times a day PRN Dry Eyes  dextrose Oral Gel 15 Gram(s) Oral once PRN Blood Glucose LESS THAN 70 milliGRAM(s)/deciliter  melatonin 3 milliGRAM(s) Oral at bedtime PRN Insomnia      Vital Signs Last 24 Hrs  T(C): 36.5 (29 Aug 2022 10:00), Max: 37 (29 Aug 2022 05:00)  T(F): 97.7 (29 Aug 2022 10:00), Max: 98.6 (29 Aug 2022 05:00)  HR: 87 (29 Aug 2022 10:00) (72 - 87)  BP: 117/74 (29 Aug 2022 10:00) (117/73 - 120/79)  BP(mean): --  RR: 18 (29 Aug 2022 10:00) (16 - 19)  SpO2: 95% (29 Aug 2022 10:00) (95% - 98%)  CAPILLARY BLOOD GLUCOSE      POCT Blood Glucose.: 282 mg/dL (29 Aug 2022 12:17)  POCT Blood Glucose.: 209 mg/dL (29 Aug 2022 07:43)  POCT Blood Glucose.: 228 mg/dL (28 Aug 2022 22:56)  POCT Blood Glucose.: 195 mg/dL (28 Aug 2022 17:05)    I&O's Summary      General: NAD, awake and alert  Eyes: conjunctiva clear, nonicteric sclera  HENMT: NCAT, MMM  Neck: Supple, trachea midline   Respiratory: No respiratory distress, CTABL, No rales, rhonchi, wheezing.  Cardiovascular: S1,S2; Regular rate and rhythm; No m/g/r. 2+ peripheral pulses  Gastrointestinal: Soft, Nontender, Nondistended; +BS.   Extremities: Limited ROM of RT shoulder, pain on passive and active lateral extension of the arm.   Neurological: Normal strength on handgrip biceps and triceps bilaterally; Sensation to LT grossly in tact in BLE  Psych: appropriate mood and affect    LABS:                    RADIOLOGY & ADDITIONAL TESTS:    Imaging Personally Reviewed:    Consultant(s) Notes Reviewed:      Care Discussed with Consultants/Other Providers:

## 2022-08-30 ENCOUNTER — TRANSCRIPTION ENCOUNTER (OUTPATIENT)
Age: 62
End: 2022-08-30

## 2022-08-30 VITALS — WEIGHT: 195.11 LBS

## 2022-08-30 LAB
ANION GAP SERPL CALC-SCNC: 11 MMOL/L — SIGNIFICANT CHANGE UP (ref 7–14)
BUN SERPL-MCNC: 11 MG/DL — SIGNIFICANT CHANGE UP (ref 7–23)
CALCIUM SERPL-MCNC: 9.4 MG/DL — SIGNIFICANT CHANGE UP (ref 8.4–10.5)
CHLORIDE SERPL-SCNC: 99 MMOL/L — SIGNIFICANT CHANGE UP (ref 98–107)
CO2 SERPL-SCNC: 24 MMOL/L — SIGNIFICANT CHANGE UP (ref 22–31)
CREAT SERPL-MCNC: 0.93 MG/DL — SIGNIFICANT CHANGE UP (ref 0.5–1.3)
EGFR: 93 ML/MIN/1.73M2 — SIGNIFICANT CHANGE UP
GLUCOSE BLDC GLUCOMTR-MCNC: 200 MG/DL — HIGH (ref 70–99)
GLUCOSE BLDC GLUCOMTR-MCNC: 205 MG/DL — HIGH (ref 70–99)
GLUCOSE BLDC GLUCOMTR-MCNC: 223 MG/DL — HIGH (ref 70–99)
GLUCOSE BLDC GLUCOMTR-MCNC: 266 MG/DL — HIGH (ref 70–99)
GLUCOSE SERPL-MCNC: 198 MG/DL — HIGH (ref 70–99)
HCT VFR BLD CALC: 45.7 % — SIGNIFICANT CHANGE UP (ref 39–50)
HGB BLD-MCNC: 16.1 G/DL — SIGNIFICANT CHANGE UP (ref 13–17)
MAGNESIUM SERPL-MCNC: 2.1 MG/DL — SIGNIFICANT CHANGE UP (ref 1.6–2.6)
MCHC RBC-ENTMCNC: 29.3 PG — SIGNIFICANT CHANGE UP (ref 27–34)
MCHC RBC-ENTMCNC: 35.2 GM/DL — SIGNIFICANT CHANGE UP (ref 32–36)
MCV RBC AUTO: 83.2 FL — SIGNIFICANT CHANGE UP (ref 80–100)
NRBC # BLD: 0 /100 WBCS — SIGNIFICANT CHANGE UP (ref 0–0)
NRBC # FLD: 0 K/UL — SIGNIFICANT CHANGE UP (ref 0–0)
PHOSPHATE SERPL-MCNC: 3.5 MG/DL — SIGNIFICANT CHANGE UP (ref 2.5–4.5)
PLATELET # BLD AUTO: 316 K/UL — SIGNIFICANT CHANGE UP (ref 150–400)
POTASSIUM SERPL-MCNC: 4.4 MMOL/L — SIGNIFICANT CHANGE UP (ref 3.5–5.3)
POTASSIUM SERPL-SCNC: 4.4 MMOL/L — SIGNIFICANT CHANGE UP (ref 3.5–5.3)
RBC # BLD: 5.49 M/UL — SIGNIFICANT CHANGE UP (ref 4.2–5.8)
RBC # FLD: 13.2 % — SIGNIFICANT CHANGE UP (ref 10.3–14.5)
SODIUM SERPL-SCNC: 134 MMOL/L — LOW (ref 135–145)
WBC # BLD: 6.04 K/UL — SIGNIFICANT CHANGE UP (ref 3.8–10.5)
WBC # FLD AUTO: 6.04 K/UL — SIGNIFICANT CHANGE UP (ref 3.8–10.5)

## 2022-08-30 PROCEDURE — 73221 MRI JOINT UPR EXTREM W/O DYE: CPT | Mod: 26,RT

## 2022-08-30 PROCEDURE — 99239 HOSP IP/OBS DSCHRG MGMT >30: CPT

## 2022-08-30 RX ADMIN — AMLODIPINE BESYLATE 10 MILLIGRAM(S): 2.5 TABLET ORAL at 05:06

## 2022-08-30 RX ADMIN — GABAPENTIN 300 MILLIGRAM(S): 400 CAPSULE ORAL at 05:06

## 2022-08-30 RX ADMIN — Medication 3: at 12:12

## 2022-08-30 RX ADMIN — LISINOPRIL 10 MILLIGRAM(S): 2.5 TABLET ORAL at 05:07

## 2022-08-30 RX ADMIN — Medication 81 MILLIGRAM(S): at 12:12

## 2022-08-30 RX ADMIN — CYCLOBENZAPRINE HYDROCHLORIDE 10 MILLIGRAM(S): 10 TABLET, FILM COATED ORAL at 05:06

## 2022-08-30 RX ADMIN — Medication 2: at 09:12

## 2022-08-30 NOTE — PROGRESS NOTE ADULT - PROVIDER SPECIALTY LIST ADULT
Cardiology
Hospitalist

## 2022-08-30 NOTE — PROGRESS NOTE ADULT - PROBLEM SELECTOR PLAN 3
- Continue home BP medications  - Dash diet

## 2022-08-30 NOTE — DIETITIAN INITIAL EVALUATION ADULT - NS FNS DIET ORDER
Regular: Consistent Carbohydrate {Evening Snack} (CSTCHOSN)  DASH/TLC {Sodium & Cholesterol Restricted} Ovo Vegetarian (Accepts Eggs)   No Caffeine (08-29-22 @ 12:55) [Active]

## 2022-08-30 NOTE — DISCHARGE NOTE NURSING/CASE MANAGEMENT/SOCIAL WORK - NSDCPEFALRISK_GEN_ALL_CORE
For information on Fall & Injury Prevention, visit: [email or web address] OR  [email or web address] OR  https://www.cdc.gov/steadi/patient.html

## 2022-08-30 NOTE — DIETITIAN INITIAL EVALUATION ADULT - ADD RECOMMEND
1. Encourage & assist Pt with meals; Monitor PO diet tolerance;   2. Honor food preferences;  3. Monitor labs, hydration status;  4. Pt to follow up with appropriate RDN upon discharge for the purposes of long-term nutrition evaluation and diet education;

## 2022-08-30 NOTE — PROGRESS NOTE ADULT - SUBJECTIVE AND OBJECTIVE BOX
DATE OF SERVICE: 08-30-22 @ 08:38    Subjective: Patient seen and examined. No new events except as noted.     SUBJECTIVE/ROS:        MEDICATIONS:  MEDICATIONS  (STANDING):  amLODIPine   Tablet 10 milliGRAM(s) Oral daily  aspirin enteric coated 81 milliGRAM(s) Oral daily  atorvastatin 20 milliGRAM(s) Oral at bedtime  cyclobenzaprine 10 milliGRAM(s) Oral three times a day  dextrose 5%. 1000 milliLiter(s) (100 mL/Hr) IV Continuous <Continuous>  dextrose 5%. 1000 milliLiter(s) (50 mL/Hr) IV Continuous <Continuous>  dextrose 50% Injectable 25 Gram(s) IV Push once  dextrose 50% Injectable 12.5 Gram(s) IV Push once  dextrose 50% Injectable 25 Gram(s) IV Push once  enoxaparin Injectable 40 milliGRAM(s) SubCutaneous every 24 hours  gabapentin 300 milliGRAM(s) Oral three times a day  glucagon  Injectable 1 milliGRAM(s) IntraMuscular once  insulin lispro (ADMELOG) corrective regimen sliding scale   SubCutaneous three times a day before meals  insulin lispro (ADMELOG) corrective regimen sliding scale   SubCutaneous at bedtime  lisinopril 10 milliGRAM(s) Oral daily      PHYSICAL EXAM:  T(C): 36.5 (08-30-22 @ 05:04), Max: 37.1 (08-29-22 @ 16:35)  HR: 76 (08-30-22 @ 05:04) (76 - 87)  BP: 117/74 (08-30-22 @ 05:04) (117/74 - 125/83)  RR: 18 (08-30-22 @ 05:04) (18 - 20)  SpO2: 97% (08-30-22 @ 05:04) (95% - 98%)  Wt(kg): --  I&O's Summary          JVP: Normal  Neck: supple  Lung: clear   CV: S1 S2 , Murmur:  Abd: soft  Ext: No edema  neuro: Awake / alert  Psych: flat affect  Skin: normal``    LABS/DATA:    CARDIAC MARKERS:                                16.1   6.04  )-----------( 316      ( 30 Aug 2022 05:50 )             45.7     08-30    134<L>  |  99  |  11  ----------------------------<  198<H>  4.4   |  24  |  0.93    Ca    9.4      30 Aug 2022 05:50  Phos  3.5     08-30  Mg     2.10     08-30      proBNP:   Lipid Profile:   HgA1c:   TSH:     TELE:  EKG:

## 2022-08-30 NOTE — PROGRESS NOTE ADULT - PROBLEM SELECTOR PROBLEM 7
H/O polycythemia vera

## 2022-08-30 NOTE — PROGRESS NOTE ADULT - SUBJECTIVE AND OBJECTIVE BOX
Patient is a 62y old  Male who presents with a chief complaint of Chest pain (30 Aug 2022 08:38)    Jesu Sanders MD   [facility] Medicine   Pager [phone]  Reachable on [employer] Teams     SUBJECTIVE / OVERNIGHT EVENTS:  Patient seen and examined this morning. States that he is still having some pain of the right shoulder.    MEDICATIONS  (STANDING):  amLODIPine   Tablet 10 milliGRAM(s) Oral daily  aspirin enteric coated 81 milliGRAM(s) Oral daily  atorvastatin 20 milliGRAM(s) Oral at bedtime  dextrose 5%. 1000 milliLiter(s) (100 mL/Hr) IV Continuous <Continuous>  dextrose 5%. 1000 milliLiter(s) (50 mL/Hr) IV Continuous <Continuous>  dextrose 50% Injectable 25 Gram(s) IV Push once  dextrose 50% Injectable 12.5 Gram(s) IV Push once  dextrose 50% Injectable 25 Gram(s) IV Push once  enoxaparin Injectable 40 milliGRAM(s) SubCutaneous every 24 hours  gabapentin 300 milliGRAM(s) Oral three times a day  glucagon  Injectable 1 milliGRAM(s) IntraMuscular once  insulin lispro (ADMELOG) corrective regimen sliding scale   SubCutaneous three times a day before meals  insulin lispro (ADMELOG) corrective regimen sliding scale   SubCutaneous at bedtime  lisinopril 10 milliGRAM(s) Oral daily    MEDICATIONS  (PRN):  artificial tears (preservative free) Ophthalmic Solution 1 Drop(s) Both EYES four times a day PRN Dry Eyes  dextrose Oral Gel 15 Gram(s) Oral once PRN Blood Glucose LESS THAN 70 milliGRAM(s)/deciliter  melatonin 3 milliGRAM(s) Oral at bedtime PRN Insomnia      Vital Signs Last 24 Hrs  T(C): 36.5 (30 Aug 2022 05:04), Max: 37.1 (29 Aug 2022 16:35)  T(F): 97.7 (30 Aug 2022 05:04), Max: 98.8 (29 Aug 2022 16:35)  HR: 76 (30 Aug 2022 05:04) (76 - 84)  BP: 117/74 (30 Aug 2022 05:04) (117/74 - 125/83)  BP(mean): --  RR: 18 (30 Aug 2022 05:04) (18 - 20)  SpO2: 97% (30 Aug 2022 05:04) (96% - 98%)  CAPILLARY BLOOD GLUCOSE      POCT Blood Glucose.: 266 mg/dL (30 Aug 2022 11:53)  POCT Blood Glucose.: 223 mg/dL (30 Aug 2022 09:08)  POCT Blood Glucose.: 205 mg/dL (30 Aug 2022 07:00)  POCT Blood Glucose.: 226 mg/dL (29 Aug 2022 21:25)  POCT Blood Glucose.: 289 mg/dL (29 Aug 2022 17:13)  POCT Blood Glucose.: 282 mg/dL (29 Aug 2022 12:17)    I&O's Summary      General: NAD, awake and alert  Eyes: conjunctiva clear, nonicteric sclera  HENMT: NCAT, MMM  Neck: Supple, trachea midline   Respiratory: No respiratory distress, CTABL, No rales, rhonchi, wheezing.  Cardiovascular: S1,S2; Regular rate and rhythm; No m/g/r. 2+ peripheral pulses  Gastrointestinal: Soft, Nontender, Nondistended; +BS.   Extremities: Limited ROM of RT shoulder, pain on passive and active lateral extension of the arm.   Neurological: Normal strength on handgrip biceps and triceps bilaterally; Sensation to LT grossly in tact in BLE  Psych: appropriate mood and affect      LABS:                        16.1   6.04  )-----------( 316      ( 30 Aug 2022 05:50 )             45.7     08-30    134<L>  |  99  |  11  ----------------------------<  198<H>  4.4   |  24  |  0.93    Ca    9.4      30 Aug 2022 05:50  Phos  3.5     08-30  Mg     2.10     08-30                RADIOLOGY & ADDITIONAL TESTS:    Imaging Personally Reviewed:    Consultant(s) Notes Reviewed:      Care Discussed with Consultants/Other Providers:

## 2022-08-30 NOTE — PROGRESS NOTE ADULT - PROBLEM SELECTOR PLAN 7
- Monitor CBC for now  - hgB 16.5  - S/p fluids.  - outpatient follow up with Dr. Daily Sena   - outpatient sleep center follow up, patient reports issue with his machine
- Monitor CBC for now  - hgB 17.1  - if hemoglobin remains elevated will consider heme consult (patient hematologist Dr. Daily Sena
- Monitor CBC for now  - hgB 16.5  - Will hydrate patient NS 100cc/hr, no indication for phlebotomy at this time  - outpatient follow up with Dr. Daily Sena   - outpatient sleep center follow up, patient reports issue with his machine
- Monitor CBC for now  - hgB 17.1  - Will hydrate patient NS 100cc/hr, no indication for phlebotomy at this time  - outpatient follow up with Dr. Daily Sena   - outpatient sleep center follow up, patient reports issue with his machine
- Monitor CBC for now  - hgB 16.5  - S/p fluids.  - outpatient follow up with Dr. Daily Sena   - outpatient sleep center follow up, patient reports issue with his machine
- Monitor CBC for now  - hgB 16.5  - S/p fluids.  - outpatient follow up with Dr. Daily Sena
- Monitor CBC for now  - hgB 16.5  - S/p fluids.  - outpatient follow up with Dr. Daily Sena   - outpatient sleep center follow up, patient reports issue with his machine

## 2022-08-30 NOTE — DISCHARGE NOTE NURSING/CASE MANAGEMENT/SOCIAL WORK - NSDCFUADDAPPT_GEN_ALL_CORE_FT
Please call today to schedule follow up with orthopedic sports medicine provider. Schedule appointment with Dr. Kal Boland or Dr. Rayshawn Lopes. The phone number for the clinic is  [phone]

## 2022-08-30 NOTE — DIETITIAN INITIAL EVALUATION ADULT - OTHER INFO
Pt 61 yo male with PMHx of JOHNNIE, HTN, HLD, DM2, and PCV c/b splenic infarct in 2012 presented with sided arm and chest pain - per chart review.     At time of visit, Pt awake, alert. Per Pt, his appetite usually good; no chewing or swallowing difficulty; no nausea, vomiting or diarrhea @ this time. +BM (8/25) per flow sheet. Food preferences discussed; Pt [id number] (accepts fish, eggs, vegetables).       Per Pt, his height: ~67" & his weight: ~195#; no report of weight loss or weight changes PTA. Of note, Pt's HbA1c level 7.6% (8/24). Of note, Pt's diet rx includes DASH/TLC (cholesterol and sodium restricted), Consistent Carbohydrate restrictions. Better food options discussed with Pt; no food related concern voiced @ present. RDN remains available, Pt made aware.

## 2022-08-30 NOTE — DISCHARGE NOTE PROVIDER - PROVIDER TOKENS
PROVIDER:[TOKEN:[[id number]]],PROVIDER:[TOKEN:[[id number]]] PROVIDER:[TOKEN:[[id number]]],PROVIDER:[TOKEN:[[id number]]],PROVIDER:[TOKEN:[[id number]],ESTABLISHEDPATIENT:[T]]

## 2022-08-30 NOTE — PROGRESS NOTE ADULT - PROBLEM SELECTOR PROBLEM 5
JOHNNIE on CPAP

## 2022-08-30 NOTE — DISCHARGE NOTE PROVIDER - NSDCFUADDAPPT_GEN_ALL_CORE_FT
Please call today to schedule follow up with orthopedic sports medicine provider. Schedule appointment with Dr. Kal Boland or Dr. Rayshawn Lopes. The phone number for the clinic is  [phone]

## 2022-08-30 NOTE — DISCHARGE NOTE PROVIDER - HOSPITAL COURSE
62 y.o male pmh of JOHNNIE on CPAP, HTN, HLD, DM2, and PCV c/b splenic infarct in 2012 presents with R sided arm and chest pain admitted for evaluation of ACS vs cervical radiculopathy. ACS ruled out, negative stress test, echo. Remains admitted for cervical radiculopathy workup.      Radicular pain in right arm.   - # Adhesive capsulitis   - CT of head and CT of C-Spine without significant abnormality, MR C-spine limited by motion but showing only mild-moderate degenerative changes  - doppler ordered to r/o DVT given mild right hand swelling with history PCV--> doppler negative   - MR shoulder confirms adhesive capsulitis  - gabapentin d/ashley given minimal improvement as pain is not neuropathic  - lidocaine patch to right shoulder daily,   - can use tylenol and NSAIDs with food  - OP follow up with ortho follow-up with Dr. Tavon Boland.     Chest pain.   - patient with right neck pain that radiates into right arm and chest described as electric feeling since May.   - Troponin 6 and EKG NSR without ischemic changes  - ECHO and stress test wnl   - unlikely cardiac likely msk related.   -Appreciate cards recs.    Essential hypertension.   - Continue home BP medications  - Dash diet.    Hyperlipidemia.   - Continue statin.    JOHNNIE on CPAP.   - Continue CPAP while admitted  - patient reports inability to tolerate his CPAP  - outpatient sleep follow up.    Type II diabetes mellitus.   - Hold metformin  - ISS and FS  - check A1C--> 7.6.    H/O polycythemia vera.   - Monitor CBC for now  - hgB 16.5  - S/p fluids.  - outpatient follow up with Dr. Daily Sena.      Patient is medically cleared for discharge on 08/30/22. Case discussed with Dr. Sanders.

## 2022-08-30 NOTE — DISCHARGE NOTE PROVIDER - NSDCMRMEDTOKEN_GEN_ALL_CORE_FT
amlodipine 10 mg oral tablet: 1 tab(s) orally once a day  Aspirin Low Dose 81 mg oral delayed release tablet: 1 tab(s) orally once a day  atorvastatin 20 mg oral tablet: 1 tab(s) orally once a day (at bedtime)  lisinopril 10 mg oral tablet: 1 tab(s) orally once a day  metFORMIN 1000 mg oral tablet: 1 tab(s) orally 2 times a day     amlodipine 10 mg oral tablet: 1 tab(s) orally once a day  Aspirin Low Dose 81 mg oral delayed release tablet: 1 tab(s) orally once a day  atorvastatin 20 mg oral tablet: 1 tab(s) orally once a day (at bedtime)  lisinopril 10 mg oral tablet: 1 tab(s) orally once a day  metFORMIN 1000 mg oral tablet: 1 tab(s) orally 2 times a day    occupational therapy: Outpatient occupational therapy 2-3 times a week.  physical therapy: Outpatient physical therapy 2-3 times a week

## 2022-08-30 NOTE — DISCHARGE NOTE PROVIDER - NSFOLLOWUPCLINICS_GEN_ALL_ED_FT
[facility] Pulmonolgy and Sleep Medicine  Pulmonology  [address] [place] [address], [place] [address]  Phone: [phone]  Fax:

## 2022-08-30 NOTE — PROGRESS NOTE ADULT - PROBLEM SELECTOR PLAN 6
- Hold metformin  - ISS and FS  - check A1C--> 7.6

## 2022-08-30 NOTE — DISCHARGE NOTE PROVIDER - CARE PROVIDERS DIRECT ADDRESSES
[email or web address]

## 2022-08-30 NOTE — PROGRESS NOTE ADULT - PROBLEM SELECTOR PROBLEM 1
Radicular pain in right arm

## 2022-08-30 NOTE — PROGRESS NOTE ADULT - ASSESSMENT
62 y.o male pmh of JOHNNIE on CPAP, HTN, HLD, DM2, and PCV c/b splenic infarct in 2012 presents with R sided arm and chest pain admitted for evaluation of ACS vs cervical radiculopathy. ACS ruled out, negative stress test, echo. Remains admitted for cervical radiculopathy workup. 
62 y.o male pmh of JOHNNIE on CPAP, HTN, HLD, DM2, and PCV c/b splenic infarct in 2012 presents with R sided arm and chest pain admitted for evaluation of ACS vs cervical radiculopathy. ACS ruled out, negative stress test, echo. Remains admitted for cervical radiculopathy workup. 
chest pain   atypical   appears to be MSK   unremarkable echo / stress test   MRI of cervical spine reviewed and has spinal stenosis with mod to severe narrowing   consider Neruo or nsx consult     HTN   cont current meds    DM II  Monitor finger stick. Insulin coverage.   
chest pain   atypical   appears to be MSK   unremarkable echo / stress test   plan for MRI of shoulder spine     HTN   cont current meds    DM II  Monitor finger stick. Insulin coverage.   
chest pain   atypical   appears to be MSK   unremarkable echo / stress test   plan for MRI of shoulder spine     HTN   cont current meds    DM II  Monitor finger stick. Insulin coverage. Diabetic education and Diabetic diet. Consider nutrition consultation.   
chest pain   atypical   appears to be MSK   unremarkable echo / stress test   plan for MRI of shoulder spine as per primary team     HTN   cont current meds    DM II  Monitor finger stick. Insulin coverage.   
chest pain   atypical   appears to be MSK   unremarkable echo / stress test   plan for MRI of shoulder spine as per primary team     HTN   cont current meds    DM II  Monitor finger stick. Insulin coverage.   
62 y.o male pmh of JOHNNIE on CPAP, HTN, HLD, DM2, and PCV c/b splenic infarct in 2012 presents with R sided arm and chest pain admitted for evaluation of ACS vs cervical radiculopathy. ACS ruled out, negative stress test, echo. Remains admitted for cervical radiculopathy workup. 
chest pain   atypical   appears to be MSK   unremarkable echo / stress test   plan for MRI of shoulder spine as per primary team     HTN   cont current meds    DM II  Monitor finger stick. Insulin coverage.   
62 y.o male pmh of JOHNNIE on CPAP, HTN, HLD, DM2, and PCV c/b splenic infarct in 2012 presents with R sided arm and chest pain admitted for evaluation of ACS vs cervical radiculopathy. ACS ruled out, negative stress test, echo. Remains admitted for cervical radiculopathy workup. 

## 2022-08-30 NOTE — PROGRESS NOTE ADULT - PROBLEM SELECTOR PLAN 1
- patient with right neck pain that radiates into right arm and chest described as electric feeling since May with significantly decreased ROM due to pain.  - PT ordered--> outpatient PT  - CT of head and CT of C-Spine without significant abnormality - however advised MRI will be better study to further eval.   - gabapentin for pain control up-titrated to 300mg TID 8/25   - added on cyclobenzaprine 10mg TID 8/25   - tylenol 650mg q 6 hours standing x 2 days   - ibuprofen 600mg q 6 hours standing x 2 days   - lidocaine patch to right shoulder daily   - MR neck and shoulder ordered for further eval, will expedite   - doppler ordered to r/o DVT given mild right hand swelling with history PCV--> doppler negative   - chronic pain consult placed (given pain present since May), to assist w/ pain control recs appreciated
- patient with right neck pain that radiates into right arm and chest described as electric feeling since May with significantly decreased ROM due to pain.  - PT ordered--> outpatient PT  - CT of head and CT of C-Spine without significant abnormality - however advised MRI will be better study to further eval.   - gabapentin for pain control  - MR neck and shoulder ordered for further eval   - doppler ordered to r/o DVT given mild right hand swelling with history PCV
- patient with right neck pain that radiates into right arm and chest described as electric feeling since May with significantly decreased ROM due to pain.  - PT ordered--> outpatient PT  - CT of head and CT of C-Spine without significant abnormality - however advised MRI will be better study to further eval.   - gabapentin for pain control up-titrated to 300mg TID 8/25   - cyclobenzaprine 10mg TID 8/25   - lidocaine patch to right shoulder daily   - MR neck and shoulder ordered for further eval  - doppler ordered to r/o DVT given mild right hand swelling with history PCV--> doppler negative   - chronic pain consult placed (given pain present since May) - recs appreciated
- patient with right neck pain that radiates into right arm and chest described as electric feeling since May with significantly decreased ROM due to pain.  - PT ordered--> outpatient PT  - CT of head and CT of C-Spine without significant abnormality, MR C-spine limited by motion but showing only mild-moderate degenerative changes  - gabapentin for pain control up-titrated to 300mg TID 8/25   - cyclobenzaprine 10mg TID 8/25   - lidocaine patch to right shoulder daily   [ ]MR shoulder ordered for further eval  - doppler ordered to r/o DVT given mild right hand swelling with history PCV--> doppler negative   - chronic pain consult placed (given pain present since May) - recs appreciated
# Adhesive capsulitis   - CT of head and CT of C-Spine without significant abnormality, MR C-spine limited by motion but showing only mild-moderate degenerative changes  - doppler ordered to r/o DVT given mild right hand swelling with history PCV--> doppler negative   - MR shoulder confirms adhesive capsulitis  - gabapentin d/ashley given minimal improvement as pain is not neuropathic,   - lidocaine patch to right shoulder daily,   - can use tylenol and NSAIDs with food  - OP follow up with ortho for consideration
- patient with right neck pain that radiates into right arm and chest described as electric feeling since May with significantly decreased ROM due to pain.  - PT ordered--> outpatient PT  - CT of head and CT of C-Spine without significant abnormality - however advised MRI will be better study to further eval.   - gabapentin for pain control up-titrated to 300mg TID 8/25   - cyclobenzaprine 10mg TID 8/25   - lidocaine patch to right shoulder daily   - MR neck and shoulder ordered for further eval  - doppler ordered to r/o DVT given mild right hand swelling with history PCV--> doppler negative   - chronic pain consult placed (given pain present since May) - recs appreciated
- patient with right neck pain that radiates into right arm and chest described as electric feeling since May with significantly decreased ROM due to pain.  - PT ordered--> outpatient PT  - CT of head and CT of C-Spine without significant abnormality - however advised MRI will be better study to further eval.   - gabapentin for pain control up-titrated to 100mg TID 8/25   - added on cyclobenzaprine 10mg TID 8/25   - MR neck and shoulder ordered for further eval   - doppler ordered to r/o DVT given mild right hand swelling with history PCV  - chronic pain consult placed (given pain present since May), to assist w/ pain control

## 2022-08-30 NOTE — PROGRESS NOTE ADULT - PROBLEM SELECTOR PLAN 8
Lovenox for DVT prevention.    Case Discussed w/ ACP and Patient    Dispo- Home today with OP sports medicine follow up for consideration of intraarticular injection for adhesive capsulitis. d/c planning 40 min coordinating care

## 2022-08-30 NOTE — DISCHARGE NOTE PROVIDER - CARE PROVIDER_API CALL
Stefan Maxwell)  Internal Medicine  [address] [place] [address]  Phone: [phone]  Fax: [phone]  Follow Up Time:     Kal Boland)  Orthopaedic Sports Medicine; Orthopaedic Surgery  [address] [place] [address]  Phone: [phone]  Fax: [phone]  Follow Up Time:    Stefan Maxwell)  Internal Medicine  [address] [place] [address]  Phone: [phone]  Fax: [phone]  Follow Up Time:     Kal Boland)  Orthopaedic Sports Medicine; Orthopaedic Surgery  [address] [place] [address]  Phone: [phone]  Fax: [phone]  Follow Up Time:     Johann Schulz)  Critical Care Medicine; Pulmonary Disease  [address] [facility], [address]  [place], [place] [id number]  Phone: [phone]  Fax: [phone]  Established Patient  Follow Up Time:

## 2022-08-30 NOTE — PROGRESS NOTE ADULT - PROBLEM SELECTOR PLAN 2
- patient with right neck pain that radiates into right arm and chest described as electric feeling since May.   - Troponin 6 and EKG NSR without ischemic changes  - ECHO stress test wnl   - unlikely cardiac likely msk related. Workup as above.
- patient with right neck pain that radiates into right arm and chest described as electric feeling since May.   - Troponin 6 and EKG NSR without ischemic changes  - ECHO stress test wnl   - unlikely cardiac likely msk related.   -Appreciate cards recs.

## 2022-08-30 NOTE — PROGRESS NOTE ADULT - PROBLEM SELECTOR PLAN 4
- Continue statin

## 2022-08-30 NOTE — DIETITIAN INITIAL EVALUATION ADULT - PERTINENT LABORATORY DATA
08-30    134<L>  |  99  |  11  ----------------------------<  198<H>  4.4   |  24  |  0.93    Ca    9.4      30 Aug 2022 05:50  Phos  3.5     08-30  Mg     2.10     08-30    POCT Blood Glucose.: 266 mg/dL (08-30-22 @ 11:53)  A1C with Estimated Average Glucose Result: 7.6 % (08-24-22 @ 06:13)  A1C with Estimated Average Glucose Result: 7.3 % (01-14-22 @ 15:17)

## 2022-08-30 NOTE — PROGRESS NOTE ADULT - PROBLEM SELECTOR PLAN 5
- Continue CPAP while admitted  - patient reports inability to tolerate his CPAP  - outpatient sleep follow up
- Continue CPAP while admitted  - patient reports issues with his home CPAP machine, outpatient sleep center follow up

## 2022-08-30 NOTE — DISCHARGE NOTE PROVIDER - NSDCCPCAREPLAN_GEN_ALL_CORE_FT
PRINCIPAL DISCHARGE DIAGNOSIS  Diagnosis: Radicular pain in right arm  Assessment and Plan of Treatment: You had an MRI which showed adhesive capsulitis of your right shoulder. This is also known as frozen shoulder, which is inflammation of your shoulder joint. This causes pain and decreased mobility. You will have physical and occupational therapy to help with mobility. You can use over-the-counter Advil and Tylenol as needed. Take these medications with food. Follow-up with the sports medicine specialist, Dr. Tavon Boland within 1-2 weeks of discharge.      SECONDARY DISCHARGE DIAGNOSES  Diagnosis: Chest pain  Assessment and Plan of Treatment: You had an echocardiogram and stress test which were both normal. The pain is probably musculoskeletal. Follow-up with your primary care doctor Dr. Maxwell within 1-2 weeks of discharge.    Diagnosis: Essential hypertension  Assessment and Plan of Treatment: Continue current blood pressure medication regimen as directed. Monitor for any visual changes, headaches or dizziness.  Monitor blood pressure regularly.  Follow up with your PCP for further management for high blood pressure, please call to make appointment within 1 week of discharge    Diagnosis: JOHNNIE on CPAP  Assessment and Plan of Treatment: Follow-up with your pulmonologist for a sleep study within 1-2 weeks of discharge.    Diagnosis: Hyperlipidemia  Assessment and Plan of Treatment: Continue cholesterol control medications. Continue DASH diet. Follow up with your primary care doctor within 1 week of discharge for further management and monitoring of lipid and cholesterol panels.    Diagnosis: H/O polycythemia vera  Assessment and Plan of Treatment: Follow up with Dr. Daily Sena.     PRINCIPAL DISCHARGE DIAGNOSIS  Diagnosis: Radicular pain in right arm  Assessment and Plan of Treatment: You had an MRI which showed adhesive capsulitis of your right shoulder. This is also known as frozen shoulder, which is inflammation of your shoulder joint. This causes pain and decreased mobility. You will have physical and occupational therapy to help with mobility. You can use over-the-counter Advil and Tylenol as needed. Take these medications with food. Follow-up with the sports medicine specialist, Dr. Tavon Boland within 1-2 weeks of discharge. Please call for an appointment.      SECONDARY DISCHARGE DIAGNOSES  Diagnosis: Chest pain  Assessment and Plan of Treatment: You had an echocardiogram and stress test which were both normal. The pain is probably musculoskeletal. Follow-up with your primary care doctor Dr. Maxwell within 1-2 weeks of discharge.    Diagnosis: Essential hypertension  Assessment and Plan of Treatment: Continue current blood pressure medication regimen as directed. Monitor for any visual changes, headaches or dizziness.  Monitor blood pressure regularly.  Follow up with your PCP for further management for high blood pressure, please call to make appointment within 1 week of discharge    Diagnosis: JOHNNIE on CPAP  Assessment and Plan of Treatment: Follow-up with your pulmonologist for a sleep study within 1-2 weeks of discharge. If you need a pulmonologist, you can call the [facility] pulmonology clinic at [phone].    Diagnosis: Hyperlipidemia  Assessment and Plan of Treatment: Continue cholesterol control medications. Continue DASH diet. Follow up with your primary care doctor within 1 week of discharge for further management and monitoring of lipid and cholesterol panels.    Diagnosis: Type II diabetes mellitus  Assessment and Plan of Treatment: Continue consistent carbohydrate diet.  Monitor blood glucose levels throughout the day before meals and at bedtime.  Record blood sugars and bring to outpatient providers appointment in order to be reviewed by your doctor for management modifications.  Be aware of diabetes management symptoms including feeling cool and clammy may be related to low glucose levels.  Feeling hot and dry may indicate high glucose levels.  If  you feel these symptoms, check your blood sugar.  Make regular podiatry appointments in order to have feet checked for wounds and toe nails cut by a doctor to prevent infections.    Diagnosis: H/O polycythemia vera  Assessment and Plan of Treatment: Follow up with Dr. Daily Sena.

## 2022-09-07 ENCOUNTER — APPOINTMENT (OUTPATIENT)
Dept: ORTHOPEDIC SURGERY | Facility: CLINIC | Age: 62
End: 2022-09-07

## 2022-09-07 ENCOUNTER — OUTPATIENT (OUTPATIENT)
Dept: OUTPATIENT SERVICES | Facility: HOSPITAL | Age: 62
LOS: 1 days | End: 2022-09-07

## 2022-09-07 ENCOUNTER — NON-APPOINTMENT (OUTPATIENT)
Age: 62
End: 2022-09-07

## 2022-09-07 ENCOUNTER — MED ADMIN CHARGE (OUTPATIENT)
Age: 62
End: 2022-09-07

## 2022-09-07 ENCOUNTER — APPOINTMENT (OUTPATIENT)
Dept: INTERNAL MEDICINE | Facility: CLINIC | Age: 62
End: 2022-09-07

## 2022-09-07 VITALS
HEART RATE: 82 BPM | TEMPERATURE: 97.3 F | OXYGEN SATURATION: 95 % | WEIGHT: 200 LBS | RESPIRATION RATE: 16 BRPM | BODY MASS INDEX: 31.39 KG/M2 | SYSTOLIC BLOOD PRESSURE: 130 MMHG | DIASTOLIC BLOOD PRESSURE: 86 MMHG | HEIGHT: 67 IN

## 2022-09-07 DIAGNOSIS — Z23 ENCOUNTER FOR IMMUNIZATION: ICD-10-CM

## 2022-09-07 PROCEDURE — 99215 OFFICE O/P EST HI 40 MIN: CPT | Mod: GC

## 2022-09-07 PROCEDURE — 99204 OFFICE O/P NEW MOD 45 MIN: CPT

## 2022-09-07 NOTE — PHYSICAL EXAM
[de-identified] : Constitutional: Well-nourished, well-developed, No acute distress\par Respiratory:  Good respiratory effort, no SOB\par Lymphatic: No regional lymphadenopathy, no lymphedema\par Psychiatric: Pleasant and normal affect, alert and oriented x3\par Skin: Clean dry and intact B/L UE/LE\par Musculoskeletal: normal except where as noted in regional exam\par \par \par Cervical Spine Exam\par APPEARANCE: no marked deformities or malalignment, normal curvature, good posture\par POSITIVE TENDERNESS: + Right upper trapezius, levator scapula, rhomboid major, and rhomboid minor, + spasm noted in the same muscles.\par NONTENDER: no bony midline tenderness.\par ROM: limited in all planes, most notably in flexion and sidebending due to pain\par RESISTIVE TESTING: painful 4/5 resisted ext, right sidebending, rotation and shoulder shrug , 5/5 flexion \par SPECIAL TESTS: + Right Spurling's\par Vasc: 2+ radial pulse b/l\par Neuro: C5 - T1 intact to motor, DTRs 2+/4 biceps, triceps, brachioradialis\par Sensation: Decreased sensation of the entire right hand, otherwise intact to light touch throughout b/l UE\par \par Thoracic Spine Exam:\par \par normal curvature and normal alignment. good posture. no midline bony tenderness, + marked spasm and associated tenderness of right paraspinal and periscapular muscles.  ROM mildly limited in sidebending and rotation due to noted spasm\par \par Right shoulder:\par APPEARANCE: no marked deformities, no swelling or malalignment\par POSITIVE TENDERNESS: + Diffusely throughout the anterior/lateral/posterior shoulder, + anterior/posterior capsule\par NONTENDER: supraspinatus, infraspinatus, teres minor. biceps. AC joint. \par ROM: Significantly limited in all planes active and passive motion limited to; flexion 100°, Abduction 90°, ER 15°, IR buttocks\par RESISTIVE TESTING: + pain, 4/5 resisted flex/ext, empty can/ER/IR, horizontal abd/add \par SPECIAL TESTS: + apley's scratch test for limited motion and pain, unable to perform other test due to limited ROM\par  [de-identified] : I reviewed, interpreted and clinically correlated the following outside imaging studies,\par \par ACC: [id number] EXAM: MR SHOULDER RT\par \par PROCEDURE DATE: 08/30/2022\par \par \par \par INTERPRETATION: EXAMINATION: MRI of the right shoulder\par \par HISTORY: Right shoulder and hand pain\par \par TECHNIQUE: Multiplanar, multisequential MR imaging was performed.\par \par FINDINGS:\par \par Rotator cuff: There is mild tendinosis and interstitial tearing of the superior fibers of the subscapularis tendon at its insertion. There are no full-thickness or retracted tendon tears. There is no rotator cuff muscle atrophy.\par \par Bursa: There is trace fluid in the subacromial subdeltoid bursa.\par \par Biceps: There is moderate intra-articular long head biceps tendinosis. There is no biceps tear or subluxation.\par \par Glenohumeral joint and Labrum: There is a physiologic amount of glenohumeral joint fluid. There is a SLAP tear. There is blunting of the posterior glenoid labrum. Articular cartilage is preserved. There is glenohumeral capsular thickening with pericapsular edema, most pronounced involving the inferior glenohumeral ligament, consistent with glenohumeral adhesive capsulitis.\par \par Acromioclavicular joint: There is advanced acromioclavicular joint arthrosis\par \par Bones: There is no fracture or osteonecrosis.\par \par IMPRESSION: Findings consistent with glenohumeral adhesive capsulitis, as above.\par SLAP tear.\par Mild tendinosis and interstitial tearing of the superior fibers of the subscapularis tendon at its insertion.\par Acromioclavicular joint arthrosis.\par \par \par \par ACC: [id number] EXAM: MR SPINE CERVICAL WAW IC\par \par PROCEDURE DATE: 08/28/2022\par \par \par \par INTERPRETATION: Clinical indication: Right upper extremity pain.\par \par MRI of the cervical spine was performed using sagittal T1-T2 and STIR sequence. Axial T1 and T2-weighted sequences were performed. The patient was injected with approximately 10 cc of gadavist IV and sagittal T1-weighted sequence was performed with fat suppression. Axial T1-weighted sequences were performed.\par \par This exam is somewhat limited by motion\par \par Loss of the normal cervical lordosis is seen.\par \par The vertebral body height alignment and marrow signal appear normal\par \par Disc desiccation is seen involving C2-C3 4 C4-5 C5-6 C6-7 C7-T1 levels. These findings are secondary to chronic degenerative change\par \par C2-3: Disc bulge is seen. No significant compromise of the spinal canal or either neural foramen\par \par C3-4: Disc bulge and bilateral hypertrophic facet joint changes. Moderate narrowing of the right neural foramen\par \par C4-5: Disc bulge and bilateral hypertrophic facet changes. Moderate narrowing of the left neural foramen and moderate to severe narrowing of the right neural foramen\par \par C5-6: Disc bulge and bilateral hypertrophic facet joint change. Moderate to severe narrowing of both neural foramen\par \par C6-7: Disc bulge is identified. Mild narrowing of the spinal canal and both neural foramen\par \par C7-T1: Normal\par \par T1-2: Normal\par \par The spinal cord demonstrates normal signal and caliber.\par \par Evaluation of the paraspinal soft tissues appear normal\par \par No abnormal enhancement is seen.\par \par IMPRESSION: This exam is somewhat limited by motion though degenerative changes as described above.

## 2022-09-07 NOTE — DISCUSSION/SUMMARY
[de-identified] : Discussed findings of today's exam and possible causes of patient's pain.  Educated patient on their most probable diagnosis of chronic right shoulder and upper arm pain with recent atraumatic exacerbation due to adhesive capsulitis and secondary exacerbation of underlying degenerative disc disease and right cervical radiculopathy.  Reviewed possible courses of treatment, and we collaboratively decided best course of treatment at this time will include conservative management.  Patient was having atraumatic onset of pain in May, it gradually worsened until he went to the emergency room a few weeks ago.  Patient spent 1 week in the hospital, had extensive work-up with CT and multiple MRIs.  He has evidence of frozen shoulder on MRI and this matches his clinical exam.  He also has significant degenerative disc disease throughout the cervical spine and has subsequent development of right cervical radiculopathy.  I explained to the patient how clinically frozen shoulder can lead to exacerbation of underlying degenerative disc disease of the cervical spine.  Patient states he was not discharged on any medications from the hospital.  Patient is having significant pain at this time.  At this time recommend a course of oral prednisone with an appropriate taper (We discussed all possible side effects of this medication), patient advised to not take oral NSAIDs while on prednisone.  Patient will be started on a course of physical therapy to restore normal range of motion and strength as tolerated.  We will see how the patient responds to oral medications and these conservative measures.  If he continues to have primary etiology of right cervical radiculopathy would recommend physiatry consultation for SAGAR at that time.  If his right cervical radiculopathy is improving or resolved but he continues to have shoulder pain and stiffness we may consider cortisone injection into the glenohumeral joint at that time.  Patient is advised that we can hopefully resolve his cervical radiculopathy within a matter of weeks, but frozen shoulder can take upwards of 6-9 months to fully resolve.  Recommend follow-up in 3-4 weeks for reassessment.  Patient appreciates and agrees with current plan.\par \par I work as part of an academic orthopedic group and routinely have a physician in training (resident / fellow) working with me.  Any part of the history and physical exam performed by the physician in training was either directly reviewed and/or replicated by myself.  Any procedure performed by the physician in training was performed under my direct supervision and with the consent of the patient.\par \par This note was generated using dragon medical dictation software.  A reasonable effort has been made for proofreading its contents, but typos may still remain.  If there are any questions or points of clarification needed please notify my office.

## 2022-09-08 DIAGNOSIS — E11.9 TYPE 2 DIABETES MELLITUS WITHOUT COMPLICATIONS: ICD-10-CM

## 2022-09-08 DIAGNOSIS — M75.01 ADHESIVE CAPSULITIS OF RIGHT SHOULDER: ICD-10-CM

## 2022-09-08 DIAGNOSIS — I10 ESSENTIAL (PRIMARY) HYPERTENSION: ICD-10-CM

## 2022-09-08 DIAGNOSIS — E78.5 HYPERLIPIDEMIA, UNSPECIFIED: ICD-10-CM

## 2022-09-08 NOTE — ASSESSMENT
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  ACS ruled out; negative stress test, normal echo.  MRI showed R cervical radiculopathy and R adhesive capsulitis.\par \par #R adhesive capsulitis\par -  Saw ortho today in clinic.  Prescribed steroid taper for shoulder pain, has not picked up medication yet.\par - f/u with ortho after travel to [place]\par \par #T2DM, A1c 7.6 above goal of <7\par At risk for possible steroid induced hyperglycemia with steroid for adhesive capsulitis\par - c/w metformin 1000mg BID\par - start januvia 25mg QD\par \par #HTN\par well controlled\par - c/w amlodipine and lisinopril \par \par #HCM\par - flu shot today\par - COVID x2, recommended booster with new vaccine to come in the next few months\par \par Planning to travel to [place] next week.\par \par RTC in 2-3 months with PCP\par \par CDW Dr. Reyna\par \par JY, PGY3 [employer]

## 2022-09-08 NOTE — HISTORY OF PRESENT ILLNESS
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  ACS ruled out; negative stress test, normal echo.  MRI showed R cervical radiculopathy and R adhesive capsulitis.\par \par Saw ortho today in clinic.  Prescribed steroid taper for shoulder pain, has not picked up medication yet.\par \par A1c in hospital was 7.6%. AM FS at home 140-170s. \par BP was well controlled during hospitalization 110-120s systolic.\par \par Planning to travel to [place] next week.

## 2022-10-03 ENCOUNTER — APPOINTMENT (OUTPATIENT)
Dept: PULMONOLOGY | Facility: CLINIC | Age: 62
End: 2022-10-03

## 2022-11-03 NOTE — ED PROVIDER NOTE - TEMPLATE, MLM
Abdominal Pain, N/V/D
Class I (easy) - visualization of the soft palate, fauces, uvula, and both anterior and posterior pillars

## 2022-11-21 ENCOUNTER — APPOINTMENT (OUTPATIENT)
Dept: INTERNAL MEDICINE | Facility: CLINIC | Age: 62
End: 2022-11-21

## 2022-11-21 ENCOUNTER — OUTPATIENT (OUTPATIENT)
Dept: OUTPATIENT SERVICES | Facility: HOSPITAL | Age: 62
LOS: 1 days | End: 2022-11-21

## 2022-11-21 VITALS
HEART RATE: 72 BPM | WEIGHT: 197 LBS | HEIGHT: 67 IN | BODY MASS INDEX: 30.92 KG/M2 | OXYGEN SATURATION: 95 % | SYSTOLIC BLOOD PRESSURE: 122 MMHG | TEMPERATURE: 96.4 F | DIASTOLIC BLOOD PRESSURE: 84 MMHG

## 2022-11-21 PROCEDURE — 99215 OFFICE O/P EST HI 40 MIN: CPT | Mod: GC

## 2022-11-21 RX ORDER — BLOOD-GLUCOSE METER
KIT MISCELLANEOUS
Qty: 1 | Refills: 0 | Status: ACTIVE | COMMUNITY
Start: 2022-11-21 | End: 1900-01-01

## 2022-11-21 RX ORDER — ZOSTER VACCINE RECOMBINANT, ADJUVANTED 50 MCG/0.5
50 KIT INTRAMUSCULAR
Qty: 1 | Refills: 0 | Status: ACTIVE | COMMUNITY
Start: 2022-11-21 | End: 1900-01-01

## 2022-11-21 RX ORDER — ZOSTER VACCINE RECOMBINANT, ADJUVANTED 50 MCG/0.5
50 KIT INTRAMUSCULAR ONCE
Qty: 1 | Refills: 0 | Status: DISCONTINUED | COMMUNITY
Start: 2022-11-21 | End: 2022-11-21

## 2022-11-21 NOTE — PHYSICAL EXAM
[No Acute Distress] : no acute distress [Well Nourished] : well nourished [Well Developed] : well developed [PERRL] : pupils equal round and reactive to light [EOMI] : extraocular movements intact [Normal Oropharynx] : the oropharynx was normal [No JVD] : no jugular venous distention [No Lymphadenopathy] : no lymphadenopathy [No Respiratory Distress] : no respiratory distress  [No Accessory Muscle Use] : no accessory muscle use [Clear to Auscultation] : lungs were clear to auscultation bilaterally [Normal Rate] : normal rate  [Regular Rhythm] : with a regular rhythm [Normal S1, S2] : normal S1 and S2 [No Edema] : there was no peripheral edema [Normal Posterior Cervical Nodes] : no posterior cervical lymphadenopathy [Normal Anterior Cervical Nodes] : no anterior cervical lymphadenopathy [No CVA Tenderness] : no CVA  tenderness [No Joint Swelling] : no joint swelling [Grossly Normal Strength/Tone] : grossly normal strength/tone [Coordination Grossly Intact] : coordination grossly intact [No Focal Deficits] : no focal deficits [Normal Gait] : normal gait [Normal Affect] : the affect was normal [Normal Insight/Judgement] : insight and judgment were intact

## 2022-11-23 LAB
ALBUMIN SERPL ELPH-MCNC: 4.9 G/DL
ALP BLD-CCNC: 61 U/L
ALT SERPL-CCNC: 13 U/L
ANION GAP SERPL CALC-SCNC: 12 MMOL/L
AST SERPL-CCNC: 12 U/L
BILIRUB SERPL-MCNC: 0.6 MG/DL
BUN SERPL-MCNC: 11 MG/DL
CALCIUM SERPL-MCNC: 10.7 MG/DL
CHLORIDE SERPL-SCNC: 103 MMOL/L
CHOLEST SERPL-MCNC: 162 MG/DL
CO2 SERPL-SCNC: 23 MMOL/L
CREAT SERPL-MCNC: 1.1 MG/DL
EGFR: 76 ML/MIN/1.73M2
GLUCOSE SERPL-MCNC: 119 MG/DL
HDLC SERPL-MCNC: 41 MG/DL
LDLC SERPL CALC-MCNC: 101 MG/DL
NONHDLC SERPL-MCNC: 120 MG/DL
POTASSIUM SERPL-SCNC: 4.9 MMOL/L
PROT SERPL-MCNC: 7.8 G/DL
SODIUM SERPL-SCNC: 138 MMOL/L
TRIGL SERPL-MCNC: 97 MG/DL
TSH SERPL-ACNC: 1.2 UIU/ML

## 2022-11-23 NOTE — ASSESSMENT
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 Farley. RTC in 1 month.\par \par Jacqueline Ireland M.D., PGY-1

## 2022-11-23 NOTE — REVIEW OF SYSTEMS
[Insomnia] : insomnia [Fever] : no fever [Chills] : no chills [Fatigue] : no fatigue [Pain] : no pain [Vision Problems] : no vision problems [Earache] : no earache [Hearing Loss] : no hearing loss [Nasal Discharge] : no nasal discharge [Sore Throat] : no sore throat [Chest Pain] : no chest pain [Palpitations] : no palpitations [Lower Ext Edema] : no lower extremity edema [Shortness Of Breath] : no shortness of breath [Wheezing] : no wheezing [Cough] : no cough [Dyspnea on Exertion] : not dyspnea on exertion [Abdominal Pain] : no abdominal pain [Nausea] : no nausea [Constipation] : no constipation [Diarrhea] : no diarrhea [Vomiting] : no vomiting [Dysuria] : no dysuria [Joint Pain] : no joint pain [de-identified] : Insomnia due to shoulder pain

## 2022-11-23 NOTE — HISTORY OF PRESENT ILLNESS
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 (in 8/22) for R shoulder/chest pain (ACS ruled out, normal echo, MRI showed  R cervical radiculopathy and R adhesive capsulitis), presenting for a follow-up visit. There are no ED visits since his last appointment. The pt is accompanied by his daughter who is a nurse.\par \par R shoulder adhesive capsulitis- Patient reports 9/10 R shoulder pain since his hospitalization. Pt reports that he went to [place] shortly after his hospitalization in August and did not engage in the discharge plan (follow-up with physical therapy). Pt states that it hurts to raise his arm above shoulder level and the pain interferes with his sleep. The pain's origin is at the shoulder joint and radiates toward his neck and down his arm. He is unable to do ADLs with his right hand and is concerned that the pain is worsening. \par \par Tremor -Pt's daughter reports that she has observed an intention tremor in his right hand and states that his gait is now much slower as well. The pain also causes a "pins and needles" sensation in his right hand. The left hand is asymptomatic. Daughter is concerned about Parkinson's disease (no family known history).\par \par T2DM - Pt currently takes Januvia and Metformin. Pt has been in [place] the last two months where his blood sugar was monitored by his son. Pt reports that his blood sugar in the afternoon is usually 250-300. The pt does not normally take it in the morning at this time\par \par HTN - Pt takes amlodipine and lisinopril. The pt has not been taking his blood pressure regularly at home. \par

## 2022-11-28 DIAGNOSIS — R25.1 TREMOR, UNSPECIFIED: ICD-10-CM

## 2022-11-28 DIAGNOSIS — E11.9 TYPE 2 DIABETES MELLITUS WITHOUT COMPLICATIONS: ICD-10-CM

## 2022-11-28 DIAGNOSIS — M75.01 ADHESIVE CAPSULITIS OF RIGHT SHOULDER: ICD-10-CM

## 2022-11-28 DIAGNOSIS — I10 ESSENTIAL (PRIMARY) HYPERTENSION: ICD-10-CM

## 2022-11-28 DIAGNOSIS — E78.5 HYPERLIPIDEMIA, UNSPECIFIED: ICD-10-CM

## 2022-11-30 LAB
APOLIPOPROTEIN B: 84 MG/DL
CHOLESTEROL, TOTAL: 147 MG/DL
CHOLESTEROL/HDL RATIO: 3.7
HDL CHOLESTEROL: 40 MG/DL
HLD.LARGE SERPL-SCNC: 6616 NMOL/L
LDL MEDIUM: 313 NMOL/L
LDL SERPL QN: 217.4
LDL SERPL-SCNC: 1245 NMOL/L
LDL SMALL SERPL-SCNC: 197 NMOL/L
LDLC REAL SIZE PAT SERPL: NORMAL
LDLC SERPL CALC-MCNC: 89
LIPOPROTEIN (A): 263 NMOL/L
NON-HDL CHOLESTEROL: 107
TRIGLYCERIDES: 88 MG/DL

## 2022-12-07 ENCOUNTER — APPOINTMENT (OUTPATIENT)
Dept: ORTHOPEDIC SURGERY | Facility: CLINIC | Age: 62
End: 2022-12-07

## 2022-12-07 PROCEDURE — 20610 DRAIN/INJ JOINT/BURSA W/O US: CPT | Mod: RT

## 2022-12-07 PROCEDURE — 99214 OFFICE O/P EST MOD 30 MIN: CPT | Mod: 25

## 2022-12-07 NOTE — PHYSICAL EXAM
[de-identified] : Constitutional: Well-nourished, well-developed, No acute distress\par Respiratory:  Good respiratory effort, no SOB\par Lymphatic: No regional lymphadenopathy, no lymphedema\par Psychiatric: Pleasant and normal affect, alert and oriented x3\par Skin: Clean dry and intact B/L UE/LE\par Musculoskeletal: normal except where as noted in regional exam\par \par \par Cervical Spine Exam\par APPEARANCE: no marked deformities or malalignment, normal curvature, good posture\par POSITIVE TENDERNESS: + Right upper trapezius, levator scapula, rhomboid major, and rhomboid minor, + spasm noted in the same muscles.\par NONTENDER: no bony midline tenderness.\par ROM: limited in all planes, most notably in flexion and sidebending due to pain\par RESISTIVE TESTING: painful 4/5 resisted ext, right sidebending, rotation and shoulder shrug , 5/5 flexion \par SPECIAL TESTS: + Right Spurling's\par Vasc: 2+ radial pulse b/l\par Neuro: C5 - T1 intact to motor, DTRs 2+/4 biceps, triceps, brachioradialis\par Sensation: Decreased sensation of the entire right hand, otherwise intact to light touch throughout b/l UE\par \par Thoracic Spine Exam:\par \par normal curvature and normal alignment. good posture. no midline bony tenderness, + marked spasm and associated tenderness of right paraspinal and periscapular muscles.  ROM mildly limited in sidebending and rotation due to noted spasm\par \par Right shoulder:\par APPEARANCE: no marked deformities, no swelling or malalignment\par POSITIVE TENDERNESS: + Diffusely throughout the anterior/lateral/posterior shoulder, + anterior/posterior capsule\par NONTENDER: supraspinatus, infraspinatus, teres minor. biceps. AC joint. \par ROM: Significantly limited in all planes active and passive motion limited to; flexion 100°, Abduction 90°, ER 15°, IR buttocks\par RESISTIVE TESTING: + pain, 4/5 resisted flex/ext, empty can/ER/IR, horizontal abd/add \par SPECIAL TESTS: + apley's scratch test for limited motion and pain, unable to perform other test due to limited ROM\par

## 2022-12-07 NOTE — PROCEDURE
[de-identified] : Injection: Right glenohumeral Joint.\par Indication: Adhesive capsulitis.\par \par A discussion was had with the patient regarding this procedure and all questions were answered. All risks, benefits and alternatives were discussed. These included but were not limited to bleeding, infection, and allergic reaction.  A timeout was done to ensure correct side and pt agreed to the procedure.   Alcohol was used to clean the skin, and betadine was used to sterilize and prep the area in the posterior aspect of the shoulder. Ethyl chloride spray was then used as a topical anesthetic.  A 22-gauge 1.5" needle was used to inject 2cc of 0.25% bupivacaine and 1cc of 40mg/ml methylprednisolone into the glenohumeral joint. A sterile bandage was then applied. The patient tolerated the procedure well and there were no complications.\par

## 2022-12-07 NOTE — DISCUSSION/SUMMARY
[de-identified] : Patient was seen today for reevaluation of chronic right upper extremity pain due to combination of frozen shoulder and right cervical radiculopathy.  Patient is advised that he has 2 issues that could be causing his pain, it is not specifically clear what percentage of his pain is coming from which problem, the primary etiology seems to be cervical radiculopathy as patient is having significant pain all the way down to his hand with intermittent numbness/tingling.  However, he still does have limited shoulder range of motion consistent with adhesive capsulitis.  We discussed various treatment options as well as associated risk/benefits/alternatives and patient elected to proceed with diagnostic/therapeutic right glenohumeral intra-articular cortisone injection today (see procedure note).  Informed the patient that the numbing medicine in today's injection will last for about 4-6 hours. The steroid that was injected will start to work in 1 to 2 days, peak at 1-2 weeks, and may last up to 1-2 months.  Patient advised to make note of what percentage of his pain is alleviated from today's injection, that will help elucidate how much of his pain is coming from adhesive capsulitis.  I also recommend he proceed with physiatry consultation to discuss risk/benefits of SAGAR, particularly if he is not having any improvement in his right upper extremity pain after today's cortisone injection.  Follow up as needed.  Patient and adult daughter (acting as independent historian) appreciate and agree with current plan.\par \par \par This note was generated using dragon medical dictation software.  A reasonable effort has been made for proofreading its contents, but typos may still remain.  If there are any questions or points of clarification needed please notify my office.

## 2022-12-07 NOTE — REASON FOR VISIT
[Follow-Up Visit] : a follow-up visit for [Family Member] : family member [FreeTextEntry2] : right shoulder / UE pain

## 2022-12-30 ENCOUNTER — OUTPATIENT (OUTPATIENT)
Dept: OUTPATIENT SERVICES | Facility: HOSPITAL | Age: 62
LOS: 1 days | End: 2022-12-30

## 2022-12-30 ENCOUNTER — APPOINTMENT (OUTPATIENT)
Dept: INTERNAL MEDICINE | Facility: CLINIC | Age: 62
End: 2022-12-30
Payer: MEDICAID

## 2022-12-30 VITALS
BODY MASS INDEX: 31.08 KG/M2 | SYSTOLIC BLOOD PRESSURE: 110 MMHG | HEART RATE: 72 BPM | WEIGHT: 198 LBS | DIASTOLIC BLOOD PRESSURE: 76 MMHG | OXYGEN SATURATION: 96 % | HEIGHT: 67 IN

## 2022-12-30 DIAGNOSIS — E83.52 HYPERCALCEMIA: ICD-10-CM

## 2022-12-30 PROCEDURE — 99214 OFFICE O/P EST MOD 30 MIN: CPT | Mod: GC

## 2022-12-30 RX ORDER — PREDNISONE 10 MG/1
10 TABLET ORAL
Qty: 30 | Refills: 0 | Status: DISCONTINUED | COMMUNITY
Start: 2022-09-07 | End: 2022-12-30

## 2022-12-30 RX ORDER — ZOSTER VACCINE RECOMBINANT, ADJUVANTED 50 MCG/0.5
50 KIT INTRAMUSCULAR
Qty: 1 | Refills: 0 | Status: ACTIVE | COMMUNITY
Start: 2022-12-30 | End: 1900-01-01

## 2023-01-03 DIAGNOSIS — Z23 ENCOUNTER FOR IMMUNIZATION: ICD-10-CM

## 2023-01-03 DIAGNOSIS — E78.5 HYPERLIPIDEMIA, UNSPECIFIED: ICD-10-CM

## 2023-01-03 DIAGNOSIS — E11.9 TYPE 2 DIABETES MELLITUS WITHOUT COMPLICATIONS: ICD-10-CM

## 2023-01-03 DIAGNOSIS — M75.01 ADHESIVE CAPSULITIS OF RIGHT SHOULDER: ICD-10-CM

## 2023-01-03 DIAGNOSIS — D75.1 SECONDARY POLYCYTHEMIA: ICD-10-CM

## 2023-01-03 DIAGNOSIS — E83.52 HYPERCALCEMIA: ICD-10-CM

## 2023-01-03 DIAGNOSIS — H90.3 SENSORINEURAL HEARING LOSS, BILATERAL: ICD-10-CM

## 2023-01-03 DIAGNOSIS — I10 ESSENTIAL (PRIMARY) HYPERTENSION: ICD-10-CM

## 2023-01-03 DIAGNOSIS — R25.1 TREMOR, UNSPECIFIED: ICD-10-CM

## 2023-01-03 DIAGNOSIS — G47.33 OBSTRUCTIVE SLEEP APNEA (ADULT) (PEDIATRIC): ICD-10-CM

## 2023-01-03 LAB
24R-OH-CALCIDIOL SERPL-MCNC: 31.4 PG/ML
25(OH)D3 SERPL-MCNC: 13.4 NG/ML
ALBUMIN SERPL ELPH-MCNC: 4.8 G/DL
ALP BLD-CCNC: 71 U/L
ALT SERPL-CCNC: 20 U/L
ANION GAP SERPL CALC-SCNC: 10 MMOL/L
AST SERPL-CCNC: 11 U/L
BASOPHILS # BLD AUTO: 0.06 K/UL
BASOPHILS NFR BLD AUTO: 1 %
BILIRUB SERPL-MCNC: 0.6 MG/DL
BUN SERPL-MCNC: 7 MG/DL
CALCIUM SERPL-MCNC: 10.4 MG/DL
CALCIUM SERPL-MCNC: 10.4 MG/DL
CHLORIDE SERPL-SCNC: 104 MMOL/L
CO2 SERPL-SCNC: 26 MMOL/L
CREAT SERPL-MCNC: 1.02 MG/DL
EGFR: 84 ML/MIN/1.73M2
EOSINOPHIL # BLD AUTO: 0.13 K/UL
EOSINOPHIL NFR BLD AUTO: 2.2 %
ESTIMATED AVERAGE GLUCOSE: 154 MG/DL
FERRITIN SERPL-MCNC: 134 NG/ML
GLUCOSE SERPL-MCNC: 100 MG/DL
HBA1C MFR BLD HPLC: 7 %
HCT VFR BLD CALC: 45.9 %
HGB BLD-MCNC: 16.4 G/DL
IMM GRANULOCYTES NFR BLD AUTO: 0.7 %
IRON SATN MFR SERPL: 24 %
IRON SERPL-MCNC: 82 UG/DL
LYMPHOCYTES # BLD AUTO: 2.78 K/UL
LYMPHOCYTES NFR BLD AUTO: 46.5 %
MAN DIFF?: NORMAL
MCHC RBC-ENTMCNC: 30.3 PG
MCHC RBC-ENTMCNC: 35.7 GM/DL
MCV RBC AUTO: 84.7 FL
MONOCYTES # BLD AUTO: 0.59 K/UL
MONOCYTES NFR BLD AUTO: 9.9 %
NEUTROPHILS # BLD AUTO: 2.38 K/UL
NEUTROPHILS NFR BLD AUTO: 39.7 %
PARATHYROID HORMONE INTACT: 45 PG/ML
PLATELET # BLD AUTO: 320 K/UL
POTASSIUM SERPL-SCNC: 4.8 MMOL/L
PROT SERPL-MCNC: 7.8 G/DL
RBC # BLD: 5.42 M/UL
RBC # FLD: 13.3 %
SODIUM SERPL-SCNC: 139 MMOL/L
TIBC SERPL-MCNC: 344 UG/DL
UIBC SERPL-MCNC: 262 UG/DL
WBC # FLD AUTO: 5.98 K/UL

## 2023-01-03 NOTE — PHYSICAL EXAM
[No Acute Distress] : no acute distress [Well Nourished] : well nourished [Well Developed] : well developed [Well-Appearing] : well-appearing [Normal Voice/Communication] : normal voice/communication [Normal Sclera/Conjunctiva] : normal sclera/conjunctiva [Normal Outer Ear/Nose] : the outer ears and nose were normal in appearance [Normal Oropharynx] : the oropharynx was normal [No JVD] : no jugular venous distention [No Lymphadenopathy] : no lymphadenopathy [Supple] : supple [Thyroid Normal, No Nodules] : the thyroid was normal and there were no nodules present [No Respiratory Distress] : no respiratory distress  [No Accessory Muscle Use] : no accessory muscle use [Clear to Auscultation] : lungs were clear to auscultation bilaterally [Normal Rate] : normal rate  [Regular Rhythm] : with a regular rhythm [Normal S1, S2] : normal S1 and S2 [No Murmur] : no murmur heard [No Edema] : there was no peripheral edema [No Extremity Clubbing/Cyanosis] : no extremity clubbing/cyanosis [Soft] : abdomen soft [Non Tender] : non-tender [Non-distended] : non-distended [No Masses] : no abdominal mass palpated [No HSM] : no HSM [Normal Bowel Sounds] : normal bowel sounds [Normal Posterior Cervical Nodes] : no posterior cervical lymphadenopathy [Normal Anterior Cervical Nodes] : no anterior cervical lymphadenopathy [No Rash] : no rash [Coordination Grossly Intact] : coordination grossly intact [No Focal Deficits] : no focal deficits [Normal Gait] : normal gait [Deep Tendon Reflexes (DTR)] : deep tendon reflexes were 2+ and symmetric [Normal Affect] : the affect was normal [Normal Insight/Judgement] : insight and judgment were intact [de-identified] : R shoulder tenderness  [de-identified] : Mild intention tremor noted b/l, R>L.

## 2023-01-03 NOTE — HISTORY OF PRESENT ILLNESS
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 At this visit, patient continues to endorse R shoulder pain. Also has progressively worsening hearing loss of 2 years. \par \par R shoulder adhesive capsulitis- Patient has been going to physical therapy, which has significantly helped with motility, but did not improve the pain. Pt denies numbness/tingling of arm. Patient was seen by ortho, who performed a steroid injection. The injection did not significantly reduce pain. Per ortho recs, recommend PM&R for SAGAR. \par \par Tremor -Pt's daughter reports that she has observed an intention tremor in his right hand and states that his gait is now much slower as well. \par \par T2DM - Pt currently takes Januvia and Metformin. \par \par HTN - Pt takes amlodipine and lisinopril. The pt has not been taking his blood pressure regularly at home. \par

## 2023-01-03 NOTE — REVIEW OF SYSTEMS
[Hearing Loss] : hearing loss [Joint Pain] : joint pain [Joint Stiffness] : joint stiffness [Negative] : Psychiatric [Earache] : no earache [Nosebleeds] : no nosebleeds [Nasal Discharge] : no nasal discharge [Sore Throat] : no sore throat [Muscle Pain] : no muscle pain [Muscle Weakness] : no muscle weakness [Back Pain] : no back pain [Joint Swelling] : no joint swelling

## 2023-01-03 NOTE — ASSESSMENT
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 for R shoulder pain and chest pain. ACS ruled out; negative stress test, normal echo. MRI showed R cervical radiculopathy and R adhesive capsulitis.\par \par #R adhesive capsulitis\par - Saw ortho today in clinic, s/p injection w/o significant help\par - referral to PM&R sent for SAGAR \par - c/w gabapentin \par \par #T2DM, A1c 7.3 1/22'\par - repeat A1c today\par - c/w metformin 1000mg BID\par - increase januvia 25 to 50mg QD\par - microalb/cr ratio wnl\par - referred for ophthalmology and podiatry \par \par #Tremor\par - Noted b/l intention tremors, mild, R>L\par - Pt observed w/o distress due to tremor\par - Will monitor, no indication for further intervention \par \par #Bradykinesia\par - Per daughter, patient with bradykinesia and she is concerned for Parkinsons\par - No family hx \par - Intention tremor not consistent for Parkinson's presentation\par - Non-specific physical exam findings \par - Will re-evaluate patient for symptoms \par - If worsening, can send a referral to neurology \par \par # Hearing loss\par - Pt w/ progressively worsening hearing loss for 2 years\par - Audiology referral placed \par \par #HTN\par well controlled\par - c/w amlodipine and lisinopril \par \par #Polycythemia\par - secondary polycythemia likely iso JOHNNIE. Less likely primary. \par - EPO level previously WNL\par - Will repeat CBC to ensure no progression \par \par #Hypercalcemia\par - Pt w/ mildly elevated Calcium, previous 10.7\par - Asymptomatic \par - Will repeat CMP and obtain PTH, PTHrp, Vitamin D levels \par \par #HLD\par - , ASCVD 19%\par - increased atorvastatin 20->40 \par \par #HCM\par - flu shot at last visit \par - COVID x2, recommended booster  \par - pt received 1st dose Shingrix November, will re-order second dose for 2-6 months after first \par - Prevnar 20 today\par \par

## 2023-01-08 LAB — PTH RELATED PROT SERPL-MCNC: <2 PMOL/L

## 2023-01-25 ENCOUNTER — OUTPATIENT (OUTPATIENT)
Dept: OUTPATIENT SERVICES | Facility: HOSPITAL | Age: 63
LOS: 1 days | End: 2023-01-25
Payer: MEDICAID

## 2023-01-25 ENCOUNTER — APPOINTMENT (OUTPATIENT)
Dept: PODIATRY | Facility: HOSPITAL | Age: 63
End: 2023-01-25
Payer: MEDICAID

## 2023-01-25 VITALS
SYSTOLIC BLOOD PRESSURE: 146 MMHG | WEIGHT: 200 LBS | TEMPERATURE: 98 F | BODY MASS INDEX: 31.39 KG/M2 | HEIGHT: 67 IN | RESPIRATION RATE: 14 BRPM | HEART RATE: 72 BPM | DIASTOLIC BLOOD PRESSURE: 84 MMHG

## 2023-01-25 DIAGNOSIS — E11.9 TYPE 2 DIABETES MELLITUS WITHOUT COMPLICATIONS: ICD-10-CM

## 2023-01-25 DIAGNOSIS — M79.609 PAIN IN UNSPECIFIED LIMB: ICD-10-CM

## 2023-01-25 PROCEDURE — 99202 OFFICE O/P NEW SF 15 MIN: CPT

## 2023-01-25 PROCEDURE — G0463: CPT

## 2023-01-25 NOTE — ASSESSMENT
[FreeTextEntry1] : 62M for diabetic foot eval\par - pt seen and evalutaed\par - afebrile, neurovascular status intact\par - no open wounds or lesions noted, nails within normal limits b/l\par - pt instructed on proper diabetic hygiene and importance of daily feet checks\par - RTC 6 months

## 2023-01-25 NOTE — PHYSICAL EXAM
[General Appearance - Alert] : alert [2+] : left foot dorsalis pedis 2+ [Pes Planus] : pes planus deformity [Ankle Swelling (On Exam)] : not present [Varicose Veins Of Lower Extremities] : not present [] : not present [de-identified] : b/l pes planus, b/l equinus

## 2023-01-25 NOTE — HISTORY OF PRESENT ILLNESS
[FreeTextEntry1] : Pt is a 61 y/o male that presents to clinic for initial diabetic evaluation. Pt has been diabatic for 26 years and is controlled with metformin, not insulin. Pt does nto check blood sugars every day. Pt denies any numbness, tingling, or pain in b/l feet and legs. Pt denies f/c/v/sob.

## 2023-02-03 ENCOUNTER — APPOINTMENT (OUTPATIENT)
Dept: PHYSICAL MEDICINE AND REHAB | Facility: CLINIC | Age: 63
End: 2023-02-03
Payer: MEDICAID

## 2023-02-03 VITALS
SYSTOLIC BLOOD PRESSURE: 133 MMHG | DIASTOLIC BLOOD PRESSURE: 81 MMHG | OXYGEN SATURATION: 96 % | TEMPERATURE: 96.3 F | HEART RATE: 93 BPM

## 2023-02-03 PROCEDURE — 99204 OFFICE O/P NEW MOD 45 MIN: CPT

## 2023-02-03 NOTE — ASSESSMENT
[FreeTextEntry1] : 62 year old man with right shoulder pain, cervical radiculopathy\par MRI from August confirming both adhesive capsulitis and foraminal stenosis\par no relief with right shoulder cortisone injection, PT, pain radiating to hand\par previous notes reviewed\par patient will continue with PT, daily stretching at home, heat\par tylenol for pain, can try meloxicam for inflammation, continue gabapentin\par will refer to Dr. Schmid to evaluate for cervical epidural injection

## 2023-02-03 NOTE — PHYSICAL EXAM
[Normal] : Oriented to person, place, and time, insight and judgement were intact and the affect was normal [de-identified] : no pain with cervical ROM, negative spurling [de-identified] : no respiratory distress  [de-identified] : warm, well perfused  [de-identified] : no deltoid bursa tenderness, pain with right shoulder ROM, abduction, elbow flexion/ext 5/5 [de-identified] : sensation intact to light touch, reflexes symmetric, negative hoffmans

## 2023-02-03 NOTE — HISTORY OF PRESENT ILLNESS
[FreeTextEntry1] : Patient is a 62 year old man who presents for evaluation. He was at [facility] in August 2022 with right shoulder and arm pain, MRI of neck and shoulder with cervical stenosis, right adhesive capsulitis. He has tried PT, cortisone injection to right shoulder in December 2022 with no relief. Taking tylenol x 3 as needed, gabapentin. Patient complains of pain in right shoulder, to right arm, hand, feels heavy, with cramping. His pain is worst at night.

## 2023-02-09 ENCOUNTER — APPOINTMENT (OUTPATIENT)
Dept: PHYSICAL MEDICINE AND REHAB | Facility: CLINIC | Age: 63
End: 2023-02-09
Payer: MEDICAID

## 2023-02-09 VITALS — HEART RATE: 83 BPM | SYSTOLIC BLOOD PRESSURE: 137 MMHG | DIASTOLIC BLOOD PRESSURE: 76 MMHG | OXYGEN SATURATION: 95 %

## 2023-02-09 PROCEDURE — 99214 OFFICE O/P EST MOD 30 MIN: CPT

## 2023-02-09 NOTE — ASSESSMENT
[FreeTextEntry1] : Mr. ANN MARIE RIVERA is a 62 year M with pain in the neck on the right with radiation down the arm to the fingers. He reports chronic long standing pain and is noting an acute on chronic exacerbation of this pain due to cervical radiculopathy. There are no myelopathic signs on today's exam.\par \par Patient reassured and educated on the diagnosis and treatment options. Risks and benefits of treatment and of delaying treatment discussed with patient. Risks discussed include but not limited to: progression of symptoms, worsening pain and functional status, etc.\par \par Dr. Michelle's clinical note on 2/23/23 reviewed\par Dr. Palma clinical note on 12/30/22 reviewed\par Dr. Quinones's clinical note on 9/7/22 reviewewed\par \par MRI cervical spine imaging reviewed with patient in office today. Imaging and report demonstrate: evidence of cervical muscle spasms with elimination of normal curvature\par \par Patient had tried and failed conservative treatment. This includes but is not limited to: Acetaminophen, NSAIDs, muscle relaxants, neuropathic medications, physician directed home exercises and/or physical therapy for at least 8 weeks. After a thorough discussion of risks and benefits patient would like to proceed with CERVICAL EPIDURAL STEROID INJECTION WITH FLUOROSCOPIC GUIDANCE AT C7-T1.\par \par Risks and benefits of having injection discussed with patient. Risks discussed included, but not limited to: pain, infection, bleeding requiring emergency surgery, headaches, damage to vital organs, etc.\par \par Based on the history, physical exam and diagnostic findings, patient will benefit from this procedure. The goal of this procedure is to reduce pain and inflammation, improve function and range of motion and to further promote increased activity and return to previous level of functioning. The ultimate goal of performing this procedure is to improve patient's quality of life.\par \par Asking for authorization for CERVICAL EPIDURAL STEROID INJECTION WITH FLUOROSCOPIC GUIDANCE AT C7-T1 to help treat patient´s pain.  Patient will be scheduled for this procedure.\par \par Continue to follow up with Dr. Mejias for right shoulder pain\par Consider ortho referral\par \par Patient was advised if the following symptoms develop: chills, fever, loss of bladder control, bowel incontinence or urinary retention, numbness/tingling or weakness is present in upper or lower extremities, to go to the nearest emergency room. This may be a new clinical condition not present at the time of the patient visit that may lead to paralysis and/or death. Patient advised if the above symptoms developed to also call the office immediately to inform us and to go to the nearest emergency room.\par

## 2023-02-09 NOTE — REVIEW OF SYSTEMS
[Negative] : Constitutional [Eye Pain] : no eye pain [Earache] : no earache [Chest Pain] : no chest pain [Shortness Of Breath] : no shortness of breath [Abdominal Pain] : no abdominal pain [Dysuria] : no dysuria [FreeTextEntry9] : right shoulder and neck pains

## 2023-02-09 NOTE — PHYSICAL EXAM
[FreeTextEntry1] : General exam \par \par Constitutional: The patient appears well-developed, well-nourished, and in no apparent distress. Patient is well-groomed.  \par \par Skin: The skin is warm and dry, with normal turgor.  No rashes or lesions are noted.  \par \par Eyes: PERRL.  \par \par ENMT: Ears: Hearing is grossly within normal limits.  \par \par Neck: Supple: The neck is supple.  \par \par Respiratory: Inspection: Breathing unlabored.  \par \par Neurologic: Alert and oriented x 3. Delay in answering questions\par \par Psychiatric: Patient is cooperative and appropriate.  Mood and affect are normal.  Patient's insight is good, and memory and judgment are intact.\par \par CERVICAL EXAM\par \par APPEARANCE:\par No visible scars\par No gross deformity or malalignment\par No erythema, swelling or ecchymosis\par Normal temperature to touch\par Decreased cervical lordosis\par No muscle atrophy of the left upper extremity\par No muscle atrophy of the right upper extremity\par No clubbing, cyanosis, swelling or erythema on the left upper extremity\par No clubbing, cyanosis, swelling or erythema on the right upper extremity\par \par TENDERNESS:\par +trigger points over bilateral trapezius muscles\par Absent over midline spinous processes\par Absent over left cervical facet joints\par +over right cervical facet joints \par Absent over left cervical paraspinal muscles and Trapezius \par +over right cervical paraspinal muscles and Trapezius\par \par ROM:\par Pain limited AROM of the cervical spine\par Pain limited PROM of the cervical spine\par +pain with flexion, extension of the cervical spine\par No pain with left side bending\par +pain with right side bending\par No pain with left rotation\par +pain with right rotation\par \par SPECIAL TESTS:\par Normal left Spurling test\par +right Spurling test\par Normal cervical compression test\par \par SENSORY TESTING:\par Intact to light touch Left C3-T2\par Intact to light touch Right C3-T2\par \par MOTOR TESTING:\par Muscle tone of the left upper extremity is normal\par Muscle tone of the right upper extremity is normal\par \par Hand  strength Left 5/5\par Hand  strength Right 5/5\par \par Finger abductor strength Left 5/5\par Finger abductor strength Right 5/5\par \par Elbow flexion strength Left 5/5\par Elbow flexion strength Right 5/5\par \par Elbow extension strength Left 5/5\par Elbow extension strength Right 5/5\par \par Shoulder flexion strength Left 5/5\par Shoulder flexion strength Right 5/5\par \par Shoulder extension strength Left 5/5\par Shoulder extension strength Right 5/5\par \par Shoulder abduction strength Left 5/5\par Shoulder abduction strength Right 5/5\par \par REFLEXES:\par Priest sign left negative\par Priest sign right +\par \par Biceps (C5) left 1+\par Biceps (C5) right 1+\par \par GAIT:\par Non-antalgic gait\par Balance normal with ambulation

## 2023-02-09 NOTE — DATA REVIEWED
[MRI] : MRI [FreeTextEntry1] : ACC: [id number] EXAM: MR SPINE CERVICAL WAW IC\par PROCEDURE DATE: 08/28/2022\par INTERPRETATION: Clinical indication: Right upper extremity pain.\par MRI of the cervical spine was performed using sagittal T1-T2 and STIR sequence. Axial T1 and T2-weighted\par sequences were performed. The patient was injected with approximately 10 cc of gadavist IV and sagittal T1-\par weighted sequence was performed with fat suppression. Axial T1-weighted sequences were performed.\par This exam is somewhat limited by motion\par Loss of the normal cervical lordosis is seen.\par The vertebral body height alignment and marrow signal appear normal\par Disc desiccation is seen involving C2-C3 4 C4-5 C5-6 C6-7 C7-T1 levels. These findings are secondary to chronic\par degenerative change\par C2-3: Disc bulge is seen. No significant compromise of the spinal canal or either neural foramen\par C3-4: Disc bulge and bilateral hypertrophic facet joint changes. Moderate narrowing of the right neural foramen\par C4-5: Disc bulge and bilateral hypertrophic facet changes. Moderate narrowing of the left neural foramen and\par moderate to severe narrowing of the right neural foramen\par C5-6: Disc bulge and bilateral hypertrophic facet joint change. Moderate to severe narrowing of both neural foramen\par C6-7: Disc bulge is identified. Mild narrowing of the spinal canal and both neural foramen\par C7-Ti: Normal\par T1-2; Normal\par The spinal cord demonstrates normal signal and caliber. Evaluation of the paraspinal soft tissues appear normal.

## 2023-02-09 NOTE — HISTORY OF PRESENT ILLNESS
[FreeTextEntry1] : ANN MARIE RIVERA is an 62 year old M here for initial evaluation of neck pains. Mr. RIVERA was sent for consultation by Dr. Michelle for possible injection. Patient had right shoudler steroid injection with Dr. 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 ACS ruled out; negative stress test, normal echo. MRI showed R cervical radiculopathy and R adhesive capsulitis."\par \par Pain location: right neck and shoulder\par Quality: shooting, burning\par Radiation: along the entire right arm into the hands and fingertips\par Severity: 10/10\par Onset: over 6 months ago in [place] with recent exacerbation\par Associated symptoms: tingling\par Numbness: denies\par Weakness: present with sever pain\par Exacerbated by: activity\par Improved by: rest\par Bowel or bladder involvement: denies\par \par Denies bowel/bladder dysfunction, saddle anesthesia, fevers, chills, weight loss, night pain, or night sweats at this time.\par \par The pain interferes with function, ADLs and quality of life.\par Patient had tried Acetaminophen, NSAIDs, prescription pain medications, muscle relaxants without any lasting relief of pain.\par \par Patient had imaging studies to evaluate the pain.\par Patient had tried physical therapy, and/or physician directed home exercises, stretching, lifestyle modification for over 8 weeks without any significant relief.

## 2023-02-11 ENCOUNTER — APPOINTMENT (OUTPATIENT)
Dept: OTOLARYNGOLOGY | Facility: CLINIC | Age: 63
End: 2023-02-11
Payer: MEDICAID

## 2023-02-11 VITALS — DIASTOLIC BLOOD PRESSURE: 78 MMHG | SYSTOLIC BLOOD PRESSURE: 130 MMHG

## 2023-02-11 VITALS — BODY MASS INDEX: 31.39 KG/M2 | WEIGHT: 200 LBS | HEIGHT: 67 IN

## 2023-02-11 PROCEDURE — 99213 OFFICE O/P EST LOW 20 MIN: CPT

## 2023-02-11 PROCEDURE — 92557 COMPREHENSIVE HEARING TEST: CPT

## 2023-02-11 PROCEDURE — 92567 TYMPANOMETRY: CPT

## 2023-02-11 NOTE — HISTORY OF PRESENT ILLNESS
[de-identified] : Did not do MRI but had 2014 - reviewed with patient - normal- hearing aid not working

## 2023-02-11 NOTE — PHYSICAL EXAM
[Midline] : trachea located in midline position [Normal] : no rashes [de-identified] : Obstructing cerumen removed AD using alligator & curette  - tolerated well - TMs normal post-removal.

## 2023-02-11 NOTE — DATA REVIEWED
[de-identified] : Mild to severe SNHL AU with the exception of hearing WNL at 500Hz in the left ear. Stable asymmetry noted at 500Hz, poorer right ear. Type A tymps AU

## 2023-02-24 ENCOUNTER — NON-APPOINTMENT (OUTPATIENT)
Age: 63
End: 2023-02-24

## 2023-02-24 ENCOUNTER — APPOINTMENT (OUTPATIENT)
Dept: PHYSICAL MEDICINE AND REHAB | Facility: CLINIC | Age: 63
End: 2023-02-24

## 2023-03-10 ENCOUNTER — APPOINTMENT (OUTPATIENT)
Dept: OPHTHALMOLOGY | Facility: CLINIC | Age: 63
End: 2023-03-10
Payer: MEDICAID

## 2023-03-10 ENCOUNTER — NON-APPOINTMENT (OUTPATIENT)
Age: 63
End: 2023-03-10

## 2023-03-10 PROCEDURE — 92134 CPTRZ OPH DX IMG PST SGM RTA: CPT

## 2023-03-10 PROCEDURE — 92014 COMPRE OPH EXAM EST PT 1/>: CPT

## 2023-03-16 ENCOUNTER — APPOINTMENT (OUTPATIENT)
Dept: INTERNAL MEDICINE | Facility: CLINIC | Age: 63
End: 2023-03-16

## 2023-03-16 RX ORDER — GABAPENTIN 300 MG/1
300 CAPSULE ORAL
Qty: 812 | Refills: 3 | Status: ACTIVE | COMMUNITY

## 2023-03-23 ENCOUNTER — OUTPATIENT (OUTPATIENT)
Dept: OUTPATIENT SERVICES | Facility: HOSPITAL | Age: 63
LOS: 1 days | Discharge: STILL ADMITTED OR ADMINISTRATIVE | End: 2023-03-23

## 2023-04-03 ENCOUNTER — APPOINTMENT (OUTPATIENT)
Dept: PHARMACY | Facility: CLINIC | Age: 63
End: 2023-04-03
Payer: SELF-PAY

## 2023-04-03 PROCEDURE — V5299A: CUSTOM | Mod: NC

## 2023-04-10 ENCOUNTER — APPOINTMENT (OUTPATIENT)
Dept: INTERNAL MEDICINE | Facility: CLINIC | Age: 63
End: 2023-04-10

## 2023-04-17 NOTE — ED CDU PROVIDER SUBSEQUENT DAY NOTE - ENDOCRINE [+], MLM

## 2023-05-04 ENCOUNTER — APPOINTMENT (OUTPATIENT)
Dept: NEUROLOGY | Facility: CLINIC | Age: 63
End: 2023-05-04

## 2023-05-23 NOTE — ED PROVIDER NOTE - PHYSICAL EXAMINATION
CARDIOLOGY FOLLOW-UP VISIT             Patient Name:  Caleb Isabel      :  1974   MRN:  [id number]     PCP:  D'Amico, Michael D, MD   PCP phone:  [phone]   Other Specialty:         Chief Complaint:  Office Visit (3 month f/u)       HISTORY OF PRESENT ILLNESS   Caleb Isabel is a 49 year old male with PMH as described below who is presenting to the clinic today for the following reason(s) or complaint(s): Office Visit (3 month f/u)     Patient presents unaccompanied in follow up.  He still has positional lightheaded and dizziness.  He states he does not drink as much water as he should.  Reviewed echo and cMRI in detail.  He is recovering from recent back fusion surgery. Recommend staying hydrated.  The patient is compliant with all cardiac medications and reports tolerating them well. No reports of SOB, palpitations, edema,  chest pain or s/s of bleeding.  He has 4 children and 4 siblings.  Recommend screening.     RECENT HOSPITALIZATION(S)   N/A    REVIEW OF SYSTEMS   All systems reviewed, pertinent positive as mentioned in HPI.    PAST MEDICAL HISTORY   He  has a past medical history of Abscess of arm, Allergic rhinitis due to pollen, Chronic pain, Depression, Disorder of intervertebral disc of lumbosacral spine (), Dizziness, GERD (gastroesophageal reflux disease), History of alcohol abuse, History of fibula fracture, History of obstructive sleep apnea (Dx ), Impingement syndrome of left shoulder (2017), Insomnia, Keratosis, Liver disease, Opioid dependence (CMD), Panic disorder, Rotator cuff injury, Thoracic outlet syndrome, Thyroid condition, Umbilical hernia, Wears contact lenses, and Wears glasses.    He has no past medical history of Malignant hyperthermia, PONV (postoperative nausea and vomiting), or Sleep apnea.     FAMILY, SOCIAL, & MEDICAL HISTORY     Family History:  His family history includes Dementia/Alzheimers in his father; Diabetes in his sister; Parkinsonism in his  father; Patient is unaware of any medical problems in his brother, mother, and sister; Suicide in his brother.     Social History:  He  reports that he has never smoked. He has never used smokeless tobacco. He reports that he does not currently use alcohol. He reports that he does not currently use drugs after having used the following drugs: Opioids.     Medical History:    Past Medical History:   Diagnosis Date   • Abscess of arm     Recurrent due to steroid injections   • Allergic rhinitis due to pollen    • Chronic pain     Chronic back pain   • Depression    • Disorder of intervertebral disc of lumbosacral spine 2006   • Dizziness     Fell x 1 d/t dizziness; Meclizine med now   • GERD (gastroesophageal reflux disease)    • History of alcohol abuse     Sober/last drink 9/2/2022   • History of fibula fracture     Per patient, 2 months ago, wears right walking boot   • History of obstructive sleep apnea Dx 2001    mild, has lost a lot of weight, no furthur problems.   • Impingement syndrome of left shoulder 02/28/2017   • Insomnia    • Keratosis    • Liver disease    • Opioid dependence (CMD)    • Panic disorder    • Rotator cuff injury     right s/p surgery in September 2012   • Thoracic outlet syndrome     right   • Thyroid condition    • Umbilical hernia    • Wears contact lenses     and prescription glasses intermittently   • Wears glasses       Family, Social, and Medical history have all been reviewed and updated.    CURRENT MEDICATIONS     Current Outpatient Medications   Medication Sig Dispense Refill   • gabapentin (NEURONTIN) 300 MG capsule Take 3 capsules by mouth in the morning and 3 capsules at noon and 3 capsules in the evening. (3 capsules TID)     • HYDROcodone-acetaminophen (NORCO) 5-325 MG per tablet Take 1-2 tablets by mouth every 6 hours as needed for Pain. 30 tablet 0   • docusate sodium-sennosides (SENOKOT S) 50-8.6 MG per tablet Take 2 tablets by mouth nightly. 30 tablet 0   • polyethylene  glycol (MIRALAX) 17 GM/SCOOP powder Take 1/4 to 1 capful daily as needed for constipation. 238 g 0   • naLOXone (NARCAN) 4 MG/0.1ML nasal spray Spray the content of 1 device into 1 nostril. Call 911. May repeat with 2nd device in alternate nostril if no response in 2-3 minutes. 2 each 1   • cyclobenzaprine (FLEXERIL) 10 MG tablet Take 1-2 tablets by mouth at bedtime. 90 tablet 1   • testosterone 20.25 MG/ACT (1.62%) gel pump Place 1 Pump onto the skin daily. Apply to dry, intact skin of the upper arms and shoulders. 75 g 2   • pantoprazole (PROTONIX) 40 MG tablet TAKE 1 TABLET BY MOUTH DAILY (Patient taking differently: Take 40 mg by mouth nightly.) 90 tablet 0   • FLUoxetine (PROzac) 20 MG capsule Take 60 mg by mouth daily. (3 tabs every morning)     • levothyroxine 112 MCG tablet Take 1 tablet by mouth daily. 90 tablet 3   • hydrOXYzine (ATARAX) 25 MG tablet Take 25 mg by mouth 3 times daily as needed for Anxiety.     • Brexpiprazole 1 MG Tab Take 1 mg by mouth at bedtime. Indications: Major Depressive Disorder     • Multiple Vitamins-Minerals (vitamin - therapeutic multivitamins w/minerals) tablet Take 1 tablet by mouth daily.       No current facility-administered medications for this visit.        PHYSICAL EXAM     Vitals 5/16/2023 5/16/2023 5/16/2023 5/17/2023 5/23/2023   SYSTOLIC 115 122 118 - 120   DIASTOLIC 76 71 74 - 78   Pulse - 110 103 - 96   Temp - 97.8 98 - -   Resp - 18 18 - -   Weight kg - - - - 107.049 kg   Height - - - - 6' 0\"   BMI (Calculated) - - - - 32.01   Temp - Patient Reported - - - (No Data) -   Some recent data might be hidden     General:  No acute distress.  HEENT:  PERRL, normocephalic/atraumatic, Dry mucus membranes   Neck:  Supple, FROM.  Trachea central.  No JVD (jugular vein distention) or carotid bruit.  CVS:  S1, S2 normal.  2/6 sytolic murmur at the apex.  Pulm:  Clear to auscultation/percussion.  No wheeze/rhonchi/rales.  Ext:  No cyanosis/clubbing/edema.  Skin:  No  rash/palpable nodules.    LABS     Recent Labs   Lab 05/16/23  0540 05/11/23  1524 05/10/23  1501 04/24/23  1035 03/17/23  1535 10/05/22  0831 09/06/22  0548 09/04/22  1723 09/04/22  1714 07/17/22  1631 07/17/22  1620 06/13/22  0723 06/12/22  0737   WBC 9.0  --  7.5  --   --   --  6.6   < > 8.3  --  6.5  --  12.0*   RBC 5.22  --  5.82  --   --   --  4.10*   < > 4.75  --  4.56  --  4.99   HGB 15.0  --  17.1*  --   --   --  12.2*   < > 14.2  --  13.3  --  14.4   HCT 47.8  --  52.6*  --  55.2*  --  36.5*   < > 41.8  --  38.7*  --  41.9     --  220  --   --   --  209   < > 251  --  368  --  231   Sodium 136 142  --   --   --   --  137   < > 135   < > 141   < > 138   Potassium 4.3 3.9  --   --   --   --  3.9   < > 4.6   < > 3.7   < > 4.0   Chloride 104 108  --   --   --   --  105   < > 101   < > 108   < > 105   Carbon Dioxide 26 29  --   --   --   --  27   < > 28   < > 25   < > 23   BUN 19 19  --   --   --   --  21*   < > 22*   < > 18   < > 20   Creatinine 1.15 1.25*  --   --   --   --  1.30*   < > 1.08   < > 1.23*   < > 1.68*   Glomerular Filtration Rate 79 71  --   --   --   --  68   < > 85   < > 73   < > 50*   TSH  --   --   --  0.503  --  0.038*  --   --   --   --   --   --   --    Glucose 134* 93  --   --   --   --  108*   < > 124*   < > 178*   < > 97   Cholesterol  --   --   --   --   --   --   --   --   --   --   --   --  378*   HDL  --   --   --   --   --   --   --   --   --   --   --   --  <10*   Triglycerides  --   --   --   --   --   --   --   --   --   --   --   --  245*   INR  --   --  1.1  --   --   --   --   --  1.2  --  1.1  --   --     < > = values in this interval not displayed.      Additional Labs:    Cholesterol (mg/dL)   Date Value   06/12/2022 378 (H)     HDL (mg/dL)   Date Value   06/12/2022 <10 (L)     No results found for: CHOHDL  Triglycerides (mg/dL)   Date Value   06/12/2022 245 (H)     No results found for: CALCLDL      Risk Scores     [id number] Score CHADSVASc Stroke Risk Score =  1      ASCVD Score The ASCVD Risk score (Jelani JONES, et al., 2019) failed to calculate for the following reasons:    The valid HDL cholesterol range is 20 to 100 mg/dL    The valid total cholesterol range is 130 to 320 mg/dL      HAS-BLED Score Epic Calculated HASBLED Score = 0      Chest Pain Score      DAPT Score        Invasive/Non-Invasive Studies     Catheterization   N/A     Cardiac Stress  N/A     Echo  Date: 11/30/2022  Severe increased left ventricular septal wall thickness 1.7cm, very mild dynamic LVOT obstruction with peak gradient 20 mmHg.  Normal left ventricular chamber size.  Hyperdynamic left ventricular systolic function.  Grade I diastolic dysfunction of the left ventricle (impaired relaxation pattern).  Normal right ventricular size and systolic function.  Normal right ventricular systolic pressure; 33 mmHg.  Mildly increased left atrial chamber size.  Aortic valve sclerosis without stenosis or regurgitation.  No prior study available for comparison.     EKG   5/15/23  Normal sinus rhythm   Normal ECG      CT/MRI   Cardiac MRI   Date: 3/17/23  IMPRESSION:     1.Left ventricles normal size and has normal systolic function. Left  ventricular ejection fraction 64% by Becerril's methods of discs. There are  no regional wall motion abnormalities.   LV wall thickness is severely increased in the basal and mid septum. The  thickest segment measures 19 mm in the basal anteroseptum. Apically  displaced bifid papillary muscles.     2.There is small amount of patchy mid myocardial delayed gadolinium  enhancement involving the basal and mid septum. By quantitative analysis  the delayed gadolinium enhancement involves approximately ~4% of  myocardial mass.      3.Right ventricle is normal size and has normal systolic function.      4. No significant valve abnormalities.     Collectively, this cardiac MRI is suggestive of hypertrophic cardiomyopathy  (basal and mid septal variant) with very small amount of  replacement  fibrosis involving ~4% of the myocardial mass.       Ultrasound   N/A     Additional Testing  N/A       ASSESSMENT   1. Hypertrophic Cardiomyopathy non obstructive: Phenotype -, 19 mm, fibrosis 4%. Holter Negative.     2.  Orthostasis Multifactorial: meds, small chamber size, neuropathy    3.  Spinal Stenosis s/p fusion    4. Polypharmacy    5. Alcohol abuse quit   PLAN   · Increase fluid intake to stay hydrated.  · Stable from cardiovascular point of view. Continue same medical therapy and risk factor modification.    · Gene negative    Return in about 1 year (around 5/23/2024)..  Call our office as needed if symptoms worsen, fail to improve as anticipated, or if new symptoms develop.    On 5/23/2023, IDom RN scribed the services personally performed by Nicola Mclaughlin MD    I have reviewed and edited the visit summary above and attest that it is accurate.       No LE edema.  No calf tenderness.  No palpable cords.

## 2023-06-20 NOTE — ED ADULT NURSE NOTE - NS ED NOTE ABUSE SUSPICION NEGLECT YN
Case Management Assessment & Discharge Planning Note    Patient name Lalito Salazar  Location MICU 14/MICU 14 MRN [id number]  : 1948 Date 2023       Current Admission Date: 2023  Current Admission Diagnosis:Idiopathic acute pancreatitis without infection or necrosis   Patient Active Problem List    Diagnosis Date Noted   • Idiopathic acute pancreatitis without infection or necrosis 2023   • Primary osteoarthritis of both knees 2023   • Stage 3 chronic kidney disease, unspecified whether stage 3a or 3b CKD ([address]) 2023   • Hypertension    • Morbid obesity with BMI of 50.0-59.9, adult ([address]) 2022   • Unsteady gait 10/30/2022   • Dyspnea on exertion 10/30/2022   • SIRS (systemic inflammatory response syndrome) ([address]) 10/30/2022   • Incontinence 10/30/2022   • GILDARDO (obstructive sleep apnea) 10/30/2022   • Vitamin D deficiency 2021   • Reactive hypoglycemia 2020   • Osteoporosis 2020   • Hyperparathyroidism ([address]) 2020   • Hashimoto's thyroiditis 2020   • Early menopause occurring in patient age younger than 39 years 2020   • S/P bariatric surgery 2020   • Myofascial pain syndrome 10/21/2019   • Status post partial thyroidectomy 2019   • Cervical spondylosis without myelopathy 2019   • Arthropathy of cervical facet joint 2019   • Occipital neuralgia of right side 2019   • Chest wall tenderness 2019   • Chronic heart failure ([address]) 2019   • Permanent atrial fibrillation ([address]) 2019   • Myalgia 2019   • Primary osteoarthritis of left knee 10/23/2018   • Primary osteoarthritis of right knee 10/23/2018   • Chronic pain of left knee 10/23/2018   • Malignant tumor of right kidney parenchyma ([address]) 2016   • Closed fracture of right distal radius and ulna 2016      LOS (days): 2  Geometric Mean LOS (GMLOS) (days): 9.80  Days to GMLOS:7.9     OBJECTIVE:    Risk of Unplanned Readmission Score: 25.46         Current admission status: Inpatient       Preferred Pharmacy:   [facility] [address] MARIAH REDDY [address] [facility] [place]  [address] [address]  Phone: [phone] Fax: [phone]    [address] [facility], [address] [facility]  [address] [facility], [place] [address]  Phone: [phone] Fax: [phone]    Primary Care Provider: Sriram Dos Santos DO    Primary Insurance: MEDICARE  Secondary Insurance: Marc Boots:  [address] - [place]   Primary Phone: [phone] (Mobile)               Advance Directives  Does patient have a [address]?: Yes  Does patient have Advance Directives?: Yes  Advance Directives: Living will, Power of  for health care  Primary Contact: Danny Jaimes, imelda [phone]         Readmission Root Cause  30 Day Readmission: No    Patient Information  Admitted from[de-identified] Home  Mental Status: Intubated, Sedated  During Assessment patient was accompanied by: Daughter (daughters)  Assessment information provided by[de-identified] Daughter  Support Systems: Children, Family members, Daughter, Son  What city do you live in?: [address] entry access options.  Select all that apply.: Ramp  Type of Current Residence: Ranch  In the last 12 months, was there a time when you were not able to pay the mortgage or rent on time?: No  In the last 12 months, how many places have you lived?: 1  In the last 12 months, was there a time when you did not have a steady place to sleep or slept in a shelter (including now)?: No  Living Arrangements: Lives w/ Daughter    Activities of Daily Living Prior to Admission  Functional Status: Independent  Completes ADLs independently?: Yes  Ambulates independently?: Yes  Does patient use assisted devices?: Yes  Assisted Devices (DME) used: Karma Tomas (uses walker for long distances only)  Does patient currently own DME?: Yes  What DME does the patient currently own?: Ronan Snyder  Does patient have a history of Outpatient Therapy (PT/OT)?: No  Does the patient have a history of Short-Term Rehab?: No  Does patient have a history of HHC?: No  Does patient currently have [address]?: No         Patient Information Continued  Income Source: Pension/residential  Does patient have prescription coverage?: Yes  Within the past 12 months, you worried that your food would run out before you got the money to buy more.: Never true  Within the past 12 months, the food you bought just didn't last and you didn't have money to get more.: Never true  Food insecurity resource given?: N/A  Does patient receive dialysis treatments?: No  Does patient have a history of substance abuse?: No  Does patient have a history of Mental Health Diagnosis?: No         Means of Transportation  Means of Transport to Appts[de-identified] Family transport  In the past 12 months, has lack of transportation kept you from medical appointments or from getting medications?: No  In the past 12 months, has lack of transportation kept you from meetings, work, or from getting things needed for daily living?: No  Was application for public transport provided?: N/A        DISCHARGE DETAILS:    Discharge planning discussed with[de-identified] TC to patient's EC/Boris mackay [phone]        CM contacted family/caregiver?: Yes             Contacts  Patient Contacts: Boris Sullivan [phone]  Relationship to Patient[de-identified] Family  Contact Method: Phone  Reason/Outcome: Continuity of Care, Emergency Contact, Discharge Planning      Would you like to participate in our [address] service program?  : No - Declined (uses [facility] and [facility])    Additional Comments: CM evening hours. Pt is presently intubated and sedated. TC to EC/dtrKenji to introduce CM role, obtain baseline assessment information and discuss DCP. Pt lives w/ daughter Kathe Hodges in ranch home w/ rampt to enter. IPTA. familty transports. Uses walker for long distances.  Await d/c recommendations.  CM will continue to follow No

## 2023-06-23 ENCOUNTER — APPOINTMENT (OUTPATIENT)
Dept: INTERNAL MEDICINE | Facility: CLINIC | Age: 63
End: 2023-06-23

## 2023-10-24 NOTE — H&P ADULT - NOSE
normal Xolair Pregnancy And Lactation Text: This medication is Pregnancy Category B and is considered safe during pregnancy. This medication is excreted in breast milk.

## 2023-11-07 ENCOUNTER — APPOINTMENT (OUTPATIENT)
Dept: INTERNAL MEDICINE | Facility: CLINIC | Age: 63
End: 2023-11-07
Payer: MEDICAID

## 2023-11-07 ENCOUNTER — LABORATORY RESULT (OUTPATIENT)
Age: 63
End: 2023-11-07

## 2023-11-07 ENCOUNTER — OUTPATIENT (OUTPATIENT)
Dept: OUTPATIENT SERVICES | Facility: HOSPITAL | Age: 63
LOS: 1 days | End: 2023-11-07

## 2023-11-07 VITALS
WEIGHT: 197 LBS | HEIGHT: 67 IN | DIASTOLIC BLOOD PRESSURE: 80 MMHG | RESPIRATION RATE: 14 BRPM | OXYGEN SATURATION: 96 % | BODY MASS INDEX: 30.92 KG/M2 | SYSTOLIC BLOOD PRESSURE: 110 MMHG | HEART RATE: 71 BPM

## 2023-11-07 DIAGNOSIS — Z00.00 ENCOUNTER FOR GENERAL ADULT MEDICAL EXAMINATION W/OUT ABNORMAL FINDINGS: ICD-10-CM

## 2023-11-07 DIAGNOSIS — G47.33 OBSTRUCTIVE SLEEP APNEA (ADULT) (PEDIATRIC): ICD-10-CM

## 2023-11-07 DIAGNOSIS — D75.1 SECONDARY POLYCYTHEMIA: ICD-10-CM

## 2023-11-07 DIAGNOSIS — E66.9 OBESITY, UNSPECIFIED: ICD-10-CM

## 2023-11-07 PROCEDURE — 99213 OFFICE O/P EST LOW 20 MIN: CPT | Mod: GE

## 2023-11-08 ENCOUNTER — APPOINTMENT (OUTPATIENT)
Dept: PHARMACY | Facility: CLINIC | Age: 63
End: 2023-11-08
Payer: SELF-PAY

## 2023-11-08 PROCEDURE — V5299A: CUSTOM

## 2023-11-09 DIAGNOSIS — M75.01 ADHESIVE CAPSULITIS OF RIGHT SHOULDER: ICD-10-CM

## 2023-11-09 DIAGNOSIS — E78.5 HYPERLIPIDEMIA, UNSPECIFIED: ICD-10-CM

## 2023-11-09 DIAGNOSIS — D75.1 SECONDARY POLYCYTHEMIA: ICD-10-CM

## 2023-11-09 DIAGNOSIS — I10 ESSENTIAL (PRIMARY) HYPERTENSION: ICD-10-CM

## 2023-11-09 DIAGNOSIS — G47.33 OBSTRUCTIVE SLEEP APNEA (ADULT) (PEDIATRIC): ICD-10-CM

## 2023-11-09 DIAGNOSIS — E11.9 TYPE 2 DIABETES MELLITUS WITHOUT COMPLICATIONS: ICD-10-CM

## 2023-11-09 DIAGNOSIS — Z00.00 ENCOUNTER FOR GENERAL ADULT MEDICAL EXAMINATION WITHOUT ABNORMAL FINDINGS: ICD-10-CM

## 2023-11-09 DIAGNOSIS — E66.9 OBESITY, UNSPECIFIED: ICD-10-CM

## 2023-11-09 DIAGNOSIS — R25.1 TREMOR, UNSPECIFIED: ICD-10-CM

## 2023-11-10 LAB
ALBUMIN SERPL ELPH-MCNC: 5 G/DL
ALP BLD-CCNC: 69 U/L
ALT SERPL-CCNC: 16 U/L
ANION GAP SERPL CALC-SCNC: 12 MMOL/L
APPEARANCE: CLEAR
AST SERPL-CCNC: 11 U/L
BILIRUB SERPL-MCNC: 0.6 MG/DL
BILIRUBIN URINE: NEGATIVE
BLOOD URINE: NEGATIVE
BUN SERPL-MCNC: 11 MG/DL
CALCIUM SERPL-MCNC: 10.5 MG/DL
CHLORIDE SERPL-SCNC: 102 MMOL/L
CHOLEST SERPL-MCNC: 244 MG/DL
CO2 SERPL-SCNC: 26 MMOL/L
COLOR: YELLOW
CREAT SERPL-MCNC: 1.13 MG/DL
CREAT SPEC-SCNC: 126 MG/DL
EGFR: 73 ML/MIN/1.73M2
ESTIMATED AVERAGE GLUCOSE: 143 MG/DL
GLUCOSE QUALITATIVE U: NEGATIVE MG/DL
GLUCOSE SERPL-MCNC: 92 MG/DL
HBA1C MFR BLD HPLC: 6.6 %
HCT VFR BLD CALC: 47.5 %
HDLC SERPL-MCNC: 45 MG/DL
HGB BLD-MCNC: 17.1 G/DL
KETONES URINE: NEGATIVE MG/DL
LDLC SERPL CALC-MCNC: 182 MG/DL
LEUKOCYTE ESTERASE URINE: ABNORMAL
MCHC RBC-ENTMCNC: 30.1 PG
MCHC RBC-ENTMCNC: 36 GM/DL
MCV RBC AUTO: 83.5 FL
MICROALBUMIN 24H UR DL<=1MG/L-MCNC: <1.2 MG/DL
MICROALBUMIN/CREAT 24H UR-RTO: NORMAL MG/G
NITRITE URINE: NEGATIVE
NONHDLC SERPL-MCNC: 199 MG/DL
PH URINE: 5.5
PLATELET # BLD AUTO: 316 K/UL
POTASSIUM SERPL-SCNC: 4.4 MMOL/L
PROT SERPL-MCNC: 8.1 G/DL
PROTEIN URINE: NEGATIVE MG/DL
RBC # BLD: 5.69 M/UL
RBC # FLD: 14.4 %
SODIUM SERPL-SCNC: 140 MMOL/L
SPECIFIC GRAVITY URINE: 1.02
TRIGL SERPL-MCNC: 95 MG/DL
UROBILINOGEN URINE: 0.2 MG/DL
WBC # FLD AUTO: 5.93 K/UL

## 2023-11-20 ENCOUNTER — APPOINTMENT (OUTPATIENT)
Dept: ORTHOPEDIC SURGERY | Facility: CLINIC | Age: 63
End: 2023-11-20
Payer: MEDICAID

## 2023-11-20 VITALS — BODY MASS INDEX: 30.61 KG/M2 | HEIGHT: 67 IN | WEIGHT: 195 LBS

## 2023-11-20 PROCEDURE — 99214 OFFICE O/P EST MOD 30 MIN: CPT

## 2023-11-27 ENCOUNTER — APPOINTMENT (OUTPATIENT)
Dept: MRI IMAGING | Facility: IMAGING CENTER | Age: 63
End: 2023-11-27
Payer: MEDICAID

## 2023-11-27 ENCOUNTER — OUTPATIENT (OUTPATIENT)
Dept: OUTPATIENT SERVICES | Facility: HOSPITAL | Age: 63
LOS: 1 days | End: 2023-11-27
Payer: MEDICAID

## 2023-11-27 DIAGNOSIS — M54.12 RADICULOPATHY, CERVICAL REGION: ICD-10-CM

## 2023-11-27 PROCEDURE — 72141 MRI NECK SPINE W/O DYE: CPT

## 2023-11-27 PROCEDURE — 72141 MRI NECK SPINE W/O DYE: CPT | Mod: 26

## 2023-12-08 ENCOUNTER — RX RENEWAL (OUTPATIENT)
Age: 63
End: 2023-12-08

## 2023-12-08 RX ORDER — GABAPENTIN 300 MG/1
300 CAPSULE ORAL 3 TIMES DAILY
Qty: 270 | Refills: 1 | Status: ACTIVE | COMMUNITY
Start: 2022-11-21 | End: 1900-01-01

## 2023-12-18 ENCOUNTER — RX RENEWAL (OUTPATIENT)
Age: 63
End: 2023-12-18

## 2023-12-18 RX ORDER — MELOXICAM 15 MG/1
15 TABLET ORAL
Qty: 30 | Refills: 0 | Status: ACTIVE | COMMUNITY
Start: 2023-02-03 | End: 1900-01-01

## 2023-12-20 ENCOUNTER — APPOINTMENT (OUTPATIENT)
Dept: PHYSICAL MEDICINE AND REHAB | Facility: CLINIC | Age: 63
End: 2023-12-20
Payer: MEDICAID

## 2023-12-20 PROCEDURE — 99214 OFFICE O/P EST MOD 30 MIN: CPT

## 2023-12-20 PROCEDURE — 99204 OFFICE O/P NEW MOD 45 MIN: CPT

## 2023-12-20 NOTE — DATA REVIEWED
[MRI] : MRI [FreeTextEntry1] : EXAM: [id number] - MR SPINE CERVICAL  - ORDERED BY: IRVING SARKAR   PROCEDURE DATE:  11/27/2023    INTERPRETATION:  MRI of the cervical spine  History: Neck pain  Technique:  Multiplanar, multi sequential images of the cervical spine were obtained without contrast using a standard protocol.  Prior study: 8/28/2022  Findings:  Bone: No acute fracture.  Alignment: No significant spondylolisthesis. Mild reversal of the normal cervical lordosis.  Disc spaces: Mild multilevel disc desiccation throughout the cervical spine.  Spinal cord:  No cord signal abnormality.  Paraspinal/Prevertebral soft tissues: Sinus disease noted.  Evaluation of the individual motion segments demonstrates the following:  C2/3 level: No canal or neuroforaminal stenosis.  C3/4 level: No canal or neuroforaminal stenosis.  C4/5 level: Minimal disc bulge. Mild bilateral uncovertebral joint hypertrophy. Mild bilateral neural foraminal narrowing. No central canal stenosis. Findings are similar to the prior study.  C5/6 level: Minimal disc bulge with superimposed right foraminal disc protrusion. Mild bilateral uncovertebral joint hypertrophy. Minimal bilateral neural foraminal narrowing. No central canal stenosis. Findings are similar to the prior study.  C6/7 level: Minimal disc bulge with central disc protrusion. No canal or neuroforaminal stenosis. Findings are similar to the prior study.  C7/T1 level: Minimal disc bulge with central disc protrusion. No canal or neuroforaminal stenosis. Lungs are similar to the prior study.  Impression:  Multilevel degenerative changes throughout the cervical spine, as detailed above. No significant change when compared to the prior study.  --- End of Report ---       UNA MCELROY M.D., ATTENDING RADIOLOGIST This document has been electronically signed. Dec  1 2023  2:29PM

## 2023-12-20 NOTE — ASSESSMENT
[FreeTextEntry1] : 63 year old male with neck pain and cervical radicular pain secondary to disc protrusion.  Given the nature of the patient's complaints and lack of significant improvement following more conservative measures, we did discuss cervical epidural steroid injection.  Risks, benefits, and expectations of the procedure were reviewed.  The patient was provided with an educational pamphlet outlining the details of the procedure so that he/she may have the ability to review the information prior to proceeding.  The patient has agreed to proceed and will follow up with me for the procedure.  Given his resting tremor, I have also asked him to consult with a neurologist to rule out Parkinson's disease.

## 2023-12-20 NOTE — CONSULT LETTER
[Dear  ___] : Dear ~ROLAN, [Consult Letter:] : I had the pleasure of evaluating your patient, [unfilled]. [Consult Closing:] : Thank you very much for allowing me to participate in the care of this patient.  If you have any questions, please do not hesitate to contact me. [Sincerely,] : Sincerely, [FreeTextEntry2] : Richie Mejias,  [facility]. [address] [place] [address] [FreeTextEntry3] : Gualberto

## 2023-12-20 NOTE — PHYSICAL EXAM
[Normal] : Alert and in no acute distress [de-identified] : tenderness to palpation over right cervical paraspinals [de-identified] : limited cervical rotation [de-identified] : diminished

## 2023-12-20 NOTE — HISTORY OF PRESENT ILLNESS
[Neck] : neck [___ yrs] : [unfilled] year(s) ago [10] : a maximum pain level of 10/10 [Right] : right [Hand] : hand [Weakness] : weakness [Insomnia] : insomnia [Gait Dysfunction] : gait dysfunction [PT] : PT [Medications] : medications [de-identified] : cramping [FreeTextEntry2] : Right arm [FreeTextEntry4] : letting the right arm hang [FreeTextEntry3] : lifting the right arm [FreeTextEntry6] : Gabapentin, Meloxicam

## 2024-01-04 ENCOUNTER — OUTPATIENT (OUTPATIENT)
Dept: OUTPATIENT SERVICES | Facility: HOSPITAL | Age: 64
LOS: 1 days | End: 2024-01-04
Payer: MEDICAID

## 2024-01-04 ENCOUNTER — APPOINTMENT (OUTPATIENT)
Dept: PHYSICAL MEDICINE AND REHAB | Facility: CLINIC | Age: 64
End: 2024-01-04

## 2024-01-04 DIAGNOSIS — M54.12 RADICULOPATHY, CERVICAL REGION: ICD-10-CM

## 2024-01-04 PROCEDURE — 82962 GLUCOSE BLOOD TEST: CPT

## 2024-01-08 ENCOUNTER — OUTPATIENT (OUTPATIENT)
Dept: OUTPATIENT SERVICES | Facility: HOSPITAL | Age: 64
LOS: 1 days | End: 2024-01-08
Payer: MEDICAID

## 2024-01-08 ENCOUNTER — APPOINTMENT (OUTPATIENT)
Dept: PHYSICAL MEDICINE AND REHAB | Facility: CLINIC | Age: 64
End: 2024-01-08
Payer: MEDICAID

## 2024-01-08 DIAGNOSIS — M54.12 RADICULOPATHY, CERVICAL REGION: ICD-10-CM

## 2024-01-08 PROCEDURE — 62321 NJX INTERLAMINAR CRV/THRC: CPT

## 2024-01-08 PROCEDURE — 82962 GLUCOSE BLOOD TEST: CPT

## 2024-01-08 NOTE — PROCEDURE
[de-identified] : [facility] [address], [place], [address] - [phone]  PATIENT:  ANN MARIE RIVERA  MEDICAL RECORD #:  DATE OF OPERATION:  01/08/2024  PREOPERATIVE DIAGNOSIS:  CERVICAL RADICULAR PAIN POSTOPERATIVE DIAGNOSIS:  CERVICAL RADICULAR PAIN PROCEDURE: CERVICAL EPIDURAL STEROID INJECTION UNDER X-RAY GUIDANCE SURGEON:  JASON HARRELL M.D. ANESTHEISA:  LOCAL EBL:  MINIMAL  INDICATIONS:  The patient returns to Pain Management with persistent neck pain with radiation down the right arm.  The pain has remained quite significant and interferes with the patients ability to perform ADLs despite the implementation of other conservative measures.  I discussed with the patient at length the risks, benefits, and expectations of the aforementioned procedure.  All of the patients questions were answered.  The patient signed informed consent and agreed to proceed.  PROCEDURE:  The patient was transported to the operating room, placed in the prone position and monitored non-invasively with stable vital signs and no complaints.  The patients back was prepped three times with betadine and draped in meticulous sterile fashion.  The C6-C7 interspace was identified fluoroscopically and the skin overlying this level was infiltrated with 5cc of 1% preservative-free lidocaine using a 25 gauge one inch needle.  The epidural space was approached and entered at this level with a 20 gauge Tuohy needle by loss of resistance technique.  Following loss of resistance to air, aspiration was negative for CSF or heme.  Then, 2cc of Omnipaque 240 were injected and the epidurogram revealed spread from C2 to T1 in the posterior epidural space bilaterally with right-sided predominance and no distinct cutoff.  Depo-Medrol 80mg was then injected with 2cc of preservative-free normal saline.  The needle tract was flushed with 2cc of 1% lidocaine and the needle was removed.  A sterile bandage was applied.  The patient tolerated the procedure well without complaint or complication.  The patient was observed in stable condition after the procedure for approximately 30 minutes before being discharged to home in the company of an adult.  The patient was provided with full written instructions.  The patient will be seen for follow-up in my office in 1 week but certainly sooner with any questions or concerns.    Jason Harrell M.D.

## 2024-01-10 DIAGNOSIS — E11.65 TYPE 2 DIABETES MELLITUS WITH HYPERGLYCEMIA: ICD-10-CM

## 2024-02-09 ENCOUNTER — APPOINTMENT (OUTPATIENT)
Dept: PHYSICAL MEDICINE AND REHAB | Facility: CLINIC | Age: 64
End: 2024-02-09

## 2024-02-12 ENCOUNTER — APPOINTMENT (OUTPATIENT)
Dept: OTOLARYNGOLOGY | Facility: CLINIC | Age: 64
End: 2024-02-12
Payer: MEDICAID

## 2024-02-12 VITALS
HEIGHT: 67 IN | BODY MASS INDEX: 30.61 KG/M2 | DIASTOLIC BLOOD PRESSURE: 77 MMHG | SYSTOLIC BLOOD PRESSURE: 124 MMHG | WEIGHT: 195 LBS | HEART RATE: 83 BPM

## 2024-02-12 PROCEDURE — 99213 OFFICE O/P EST LOW 20 MIN: CPT

## 2024-02-12 PROCEDURE — 92567 TYMPANOMETRY: CPT

## 2024-02-12 PROCEDURE — 92557 COMPREHENSIVE HEARING TEST: CPT

## 2024-02-15 ENCOUNTER — NON-APPOINTMENT (OUTPATIENT)
Age: 64
End: 2024-02-15

## 2024-02-15 ENCOUNTER — APPOINTMENT (OUTPATIENT)
Dept: CARDIOLOGY | Facility: CLINIC | Age: 64
End: 2024-02-15
Payer: MEDICAID

## 2024-02-15 VITALS
OXYGEN SATURATION: 96 % | HEIGHT: 67 IN | BODY MASS INDEX: 31.08 KG/M2 | DIASTOLIC BLOOD PRESSURE: 80 MMHG | WEIGHT: 198 LBS | SYSTOLIC BLOOD PRESSURE: 130 MMHG | HEART RATE: 110 BPM

## 2024-02-15 DIAGNOSIS — R07.89 OTHER CHEST PAIN: ICD-10-CM

## 2024-02-15 PROCEDURE — G2211 COMPLEX E/M VISIT ADD ON: CPT | Mod: NC,1L

## 2024-02-15 PROCEDURE — 93000 ELECTROCARDIOGRAM COMPLETE: CPT

## 2024-02-15 PROCEDURE — 99205 OFFICE O/P NEW HI 60 MIN: CPT | Mod: 25

## 2024-02-16 NOTE — DISCUSSION/SUMMARY
[FreeTextEntry1] : 63-year-old male with multiple cardiac risk factors and persistent right-sided chest wall discomfort questionably associated with effort.  There has been worsening right sided paresis and tremor as well as significant right shoulder pain.  Clinical findings appear to be related to cervical spine issues however cannot rule out possibility of underlying coronary artery disease.  This conclusions after extensive review of his prior medical records and consultations from orthopedics, pulmonologist, heme-onc, prior cardiologist. Will schedule repeat echocardiogram with nuclear stress testing to rule out underlying coronary disease.  Review of most recent labs from 11/23 shows A1c is adequately controlled but he has significant and untreated hyperlipidemia.  He has been on Lipitor 80 mg in the past, assuming that he is compliant with the medication, would suggest increasing atorvastatin to 80 mg with follow-up lipid profile in 3 to 4 months.  If there is no evidence of any obstructive coronary disease or cardiomyopathy, patient should follow-up with neurology/orthopedics.  This would appear to be related to long-term jackhammer injuries to his right arm and shoulder with evidence of significant spondylosis on imaging studies.  Continue pain management.  [EKG obtained to assist in diagnosis and management of assessed problem(s)] : EKG obtained to assist in diagnosis and management of assessed problem(s)

## 2024-02-16 NOTE — PHYSICAL EXAM
[Well Developed] : well developed [Well Nourished] : well nourished [No Acute Distress] : no acute distress [Normal Conjunctiva] : normal conjunctiva [Normal Venous Pressure] : normal venous pressure [No Carotid Bruit] : no carotid bruit [Normal S1, S2] : normal S1, S2 [No Murmur] : no murmur [No Rub] : no rub [No Gallop] : no gallop [Clear Lung Fields] : clear lung fields [Good Air Entry] : good air entry [No Respiratory Distress] : no respiratory distress  [Soft] : abdomen soft [Non Tender] : non-tender [No Masses/organomegaly] : no masses/organomegaly [Normal Bowel Sounds] : normal bowel sounds [Normal Gait] : normal gait [No Edema] : no edema [No Cyanosis] : no cyanosis [No Clubbing] : no clubbing [No Varicosities] : no varicosities [No Rash] : no rash [No Skin Lesions] : no skin lesions [Moves all extremities] : moves all extremities [Normal Speech] : normal speech [Alert and Oriented] : alert and oriented [Normal memory] : normal memory [de-identified] : Unable to lift right arm above the level of right shoulder due to pain. [de-identified] : Pill-rolling tremor of right hand.

## 2024-02-16 NOTE — CARDIOLOGY SUMMARY
[de-identified] : 2/15/2024, sinus rhythm, left anterior hemiblock, nonspecific ST-T wave changes in anterolateral leads. [de-identified] : 10/31/2017,There is a small, mild to moderate defect in inferior wall that is predominantly fixed with mild alec-infarct ischemia.  * Post-stress gated wall motion analysis was performed  (LVEF = 53 %;LVEDV = 94 ml.), revealing normal LV systolic  function. The LV time activity curve suggested diastolic  dysfuntion. RV function appeared normal.  [de-identified] : 10/28/2019, EF equals 60%, no segmental wall motion abnormalities.  Normal right ventricular size and function. [de-identified] : 11/1/2017,VENTRICLES: No left ventriculogram was performed.  CORONARY VESSELS: The coronary circulation is right dominant. LM: -- LM: Normal. LAD: -- LAD: Angiography showed mild atherosclerosis with no flow limiting lesions. CX: -- Circumflex: The vessel was medium sized. Angiography showed minor luminal irregularities with no flow limiting lesions. RCA: -- RCA: Normal. -- RPDA: Normal. -- RPLS: Normal.

## 2024-02-16 NOTE — REVIEW OF SYSTEMS
[Dizziness] : no dizziness [Tremor] : a tremor was seen [Numbness (Hypoesthesia)] : numbness [Convulsions] : no convulsions [Tingling (Paresthesia)] : no tingling [Weakness] : weakness [Limb Weakness (Paresis)] : limb weakness (Paresis) [Speech Disturbance] : no speech disturbance [Negative] : Heme/Lymph

## 2024-02-16 NOTE — HISTORY OF PRESENT ILLNESS
[FreeTextEntry1] : 64 yo male with h/o T2DM, HLD and hypertension.   Patient states that he is here to evaluate persistent right arm/shoulder pain and weakness with progressively worsened gait imbalance.  He states the symptomatology has been ongoing for the past several months however review of his chart shows similar symptoms as far back as 10 years ago.  He has seen numerous cardiologists over the years, but was referred now by his neurologist for evaluation of his atypical chest pain.  He does have a history of renal/splenic infarcts in the past for which she was treated with a course of anticoagulation (which was discontinued due to GI bleed).  Numerous echoes and stress tests over the years have been unremarkable.  Cardiac catheterization in November, 2017 showed no evidence of any flow-limiting lesions.  He has a history of polycythemia, daytime hypersomnolence and has been ruled out for JOHNNIE by sleep study from 3/22.  The right-sided chest wall pain has been described as occasionally burning unassociated with dyspnea, diaphoresis, palpitations or syncopal episodes.  Lasting for seconds and unassociated with exertion.  The pain can be exacerbated by lifting his right arm.  There is significant and worsening tremor in his right hand as well (parkinsonian?).  He denies any syncopal episodes.  States that the discomfort can be exacerbated by positional changes.  The patient has been diagnosed with right cervical radiculopathy, degenerative disc disease and cervical spine and cervical spondylosis.  Patient currently on disability but was a longtime  and admittedly worked with [employer] over the course of many years.

## 2024-02-19 NOTE — DATA REVIEWED
[de-identified] : Mild to severe SNHL AU with the exception of hearing WNL at 500Hz in the left ear. Stable asymmetry noted at 500Hz, poorer right ear. Type A tymps AU
hair removal not indicated

## 2024-02-19 NOTE — HISTORY OF PRESENT ILLNESS
[de-identified] : 63 year old man, annual follow up for asymmetrical SNHL - stable. History of clogged ears, bilateral cerumen impaction - last MRI 2014 - wears hearing aids - feels they need to be replaced.  Reports hearing has decreased, Right worse than Left. Denies otalgia, otorrhea, tinnitus, dizziness, vertigo, headaches related to ears, recent fevers or ear infections.

## 2024-02-27 ENCOUNTER — APPOINTMENT (OUTPATIENT)
Dept: PHYSICAL MEDICINE AND REHAB | Facility: CLINIC | Age: 64
End: 2024-02-27
Payer: MEDICAID

## 2024-02-27 PROCEDURE — 99213 OFFICE O/P EST LOW 20 MIN: CPT

## 2024-02-27 NOTE — ASSESSMENT
[FreeTextEntry1] : 63 year old male with cervical radicular pain now moderately improved following his first SERGEY.  He will continue with a course of medically supervised PT and return to see me at the earliest sign that his pain returns for further injection therapy as necessary.

## 2024-02-28 ENCOUNTER — APPOINTMENT (OUTPATIENT)
Dept: CARDIOLOGY | Facility: CLINIC | Age: 64
End: 2024-02-28
Payer: MEDICAID

## 2024-02-28 PROCEDURE — 93306 TTE W/DOPPLER COMPLETE: CPT

## 2024-03-08 ENCOUNTER — NON-APPOINTMENT (OUTPATIENT)
Age: 64
End: 2024-03-08

## 2024-03-08 NOTE — ED PROVIDER NOTE - NS_EDPROVIDERDISPOUSERTYPE_ED_A_ED
Confirming Upcoming Procedure: colonoscopy on 03/15/2024  Physician performing: Dr lewis   Location of procedure:  [facility]   Prep: miralax     Resend prep trough patient email, as he requested      Attending Attestation (For Attendings USE Only)...

## 2024-03-11 ENCOUNTER — APPOINTMENT (OUTPATIENT)
Dept: CARDIOLOGY | Facility: CLINIC | Age: 64
End: 2024-03-11
Payer: MEDICAID

## 2024-03-11 PROCEDURE — 78452 HT MUSCLE IMAGE SPECT MULT: CPT

## 2024-03-11 PROCEDURE — 93015 CV STRESS TEST SUPVJ I&R: CPT

## 2024-03-11 PROCEDURE — A9500: CPT

## 2024-03-26 ENCOUNTER — APPOINTMENT (OUTPATIENT)
Dept: INTERNAL MEDICINE | Facility: CLINIC | Age: 64
End: 2024-03-26
Payer: MEDICAID

## 2024-03-26 ENCOUNTER — OUTPATIENT (OUTPATIENT)
Dept: OUTPATIENT SERVICES | Facility: HOSPITAL | Age: 64
LOS: 1 days | End: 2024-03-26

## 2024-03-26 VITALS
HEART RATE: 66 BPM | WEIGHT: 194 LBS | DIASTOLIC BLOOD PRESSURE: 82 MMHG | HEIGHT: 67 IN | OXYGEN SATURATION: 96 % | BODY MASS INDEX: 30.45 KG/M2 | SYSTOLIC BLOOD PRESSURE: 142 MMHG

## 2024-03-26 DIAGNOSIS — H90.3 SENSORINEURAL HEARING LOSS, BILATERAL: ICD-10-CM

## 2024-03-26 DIAGNOSIS — R25.1 TREMOR, UNSPECIFIED: ICD-10-CM

## 2024-03-26 DIAGNOSIS — M75.01 ADHESIVE CAPSULITIS OF RIGHT SHOULDER: ICD-10-CM

## 2024-03-26 DIAGNOSIS — E78.5 HYPERLIPIDEMIA, UNSPECIFIED: ICD-10-CM

## 2024-03-26 DIAGNOSIS — E55.9 VITAMIN D DEFICIENCY, UNSPECIFIED: ICD-10-CM

## 2024-03-26 DIAGNOSIS — I10 ESSENTIAL (PRIMARY) HYPERTENSION: ICD-10-CM

## 2024-03-26 PROCEDURE — 99213 OFFICE O/P EST LOW 20 MIN: CPT | Mod: GE

## 2024-03-26 RX ORDER — MULTIVIT-MIN/IRON/FOLIC ACID/K 18-600-40
50 MCG CAPSULE ORAL DAILY
Qty: 90 | Refills: 1 | Status: DISCONTINUED | COMMUNITY
Start: 2024-03-26 | End: 2024-03-26

## 2024-03-26 RX ORDER — SITAGLIPTIN 50 MG/1
50 TABLET, FILM COATED ORAL DAILY
Qty: 90 | Refills: 1 | Status: ACTIVE | COMMUNITY
Start: 2022-09-07 | End: 1900-01-01

## 2024-03-26 RX ORDER — ATORVASTATIN CALCIUM 80 MG/1
80 TABLET, FILM COATED ORAL
Qty: 90 | Refills: 2 | Status: ACTIVE | COMMUNITY
Start: 2017-04-05 | End: 1900-01-01

## 2024-03-26 RX ORDER — BLOOD-GLUCOSE METER
W/DEVICE KIT MISCELLANEOUS
Qty: 1 | Refills: 0 | Status: ACTIVE | COMMUNITY
Start: 2022-11-21 | End: 1900-01-01

## 2024-03-27 ENCOUNTER — APPOINTMENT (OUTPATIENT)
Dept: PHARMACY | Facility: CLINIC | Age: 64
End: 2024-03-27
Payer: SELF-PAY

## 2024-03-27 PROCEDURE — V5299A: CUSTOM

## 2024-03-28 RX ORDER — ALCOHOL ANTISEPTIC PADS
70 PADS, MEDICATED (EA) TOPICAL
Qty: 1 | Refills: 5 | Status: ACTIVE | COMMUNITY
Start: 2018-02-14 | End: 1900-01-01

## 2024-03-28 RX ORDER — PEN NEEDLE, DIABETIC 31 GX5/16"
NEEDLE, DISPOSABLE MISCELLANEOUS
Qty: 1 | Refills: 0 | Status: ACTIVE | COMMUNITY
Start: 2018-02-14 | End: 1900-01-01

## 2024-03-28 RX ORDER — BLOOD-GLUCOSE METER
KIT MISCELLANEOUS TWICE DAILY
Qty: 1 | Refills: 5 | Status: ACTIVE | COMMUNITY
Start: 2018-02-14 | End: 1900-01-01

## 2024-04-01 NOTE — HISTORY OF PRESENT ILLNESS
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   R shoulder/neck pain 2/2 cervical radiculopathy and adhevise capsulitis - s/p a cervical epidural steroid infection with PM&R with improvement in pain. Patient also started PT twice weekly. To rule out atypical chest pain, patient was seen by cardiology and underwent an echo and nuclear stress test, with normal cardiac function. Patient continues gabapentin 300 TID.   Sensoneural hearing loss- obtained  b/l hearing aids  T2DM - takes metformin 1000BID and Januvia. Last A1c 6.6 11/2023.  Tremor- resting right sided tremor with R arm stiffness, masked facies, reduced voluntary movement. Was seen by outpatietn neurologist and started on carbidopa/levidopa. Will have to see a different neurologist due to insurance, scheduled for May

## 2024-04-01 NOTE — ASSESSMENT
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, h/p splenic infarct and renal thrombosis (was on Coumadin but was DC'd due to hemarthrosis), sensorineural hearing loss, R shoulder adhesive capsulitis, R cervical radiculopathy, vitamin D deficiency, Parkinson's like tremor undergoing evaluation   #R adhesive capsulitis - c/w PT  - PM&R f/u  # R cervical radiculopathy - s/p SERGEY with improvemnt - f/u PM&R  #T2DM, A1c 6.6 11/2023 - c/w metformin 1000mg BID - c/w Januvia 50mg QD - microalb/cr ratio wnl - referred for ophthalmology and podiatry - repeat A1c next visit  #Tremor - R sided resting tremor, with masked facies and decreased voluntary movement. Mild bradykinesia - outpatient neurologist started on carbidopa/levodopa - will establish care with new neurologist in May and pursue further testing   # Hearing loss - s/p hearing aids   #HTN - in office SBP in 140s, at home consistently in 120s - c/w amlodipine and lisinopril - ordered new BP machine    #Polycythemia - secondary polycythemia likely iso JOHNNIE.  - EPO level previously WNL, CBC trending laterally  - repeat CBC next visit at [facility]  #Vitamin D deficiency - prior level 13.4 - low dose supplementation (400 IU) due to past hx of mild hypercalcemia - repeat vitamin D level at CPE next visit and titrate dose up as needed  #HLD - , total cholesterol 244 11/2023 - increased atorvastatin 40 ->80  - repeat lipid profile and LFTs next visit to evaluate effects of increased statin therapy   #HCM - GI referral placed for colonoscopy  - Ophthal and Podiatry for DM  D/w Dr. Gamboa

## 2024-04-01 NOTE — PHYSICAL EXAM
[No Acute Distress] : no acute distress [Normal Sclera/Conjunctiva] : normal sclera/conjunctiva [PERRL] : pupils equal round and reactive to light [No Respiratory Distress] : no respiratory distress  [No Accessory Muscle Use] : no accessory muscle use [Clear to Auscultation] : lungs were clear to auscultation bilaterally [Normal Rate] : normal rate  [Normal S1, S2] : normal S1 and S2 [Regular Rhythm] : with a regular rhythm [No Edema] : there was no peripheral edema [No Extremity Clubbing/Cyanosis] : no extremity clubbing/cyanosis [Soft] : abdomen soft [Non Tender] : non-tender [No HSM] : no HSM [Normal Bowel Sounds] : normal bowel sounds [No CVA Tenderness] : no CVA  tenderness [No Spinal Tenderness] : no spinal tenderness [No Joint Swelling] : no joint swelling [Coordination Grossly Intact] : coordination grossly intact [Normal Affect] : the affect was normal [Normal Insight/Judgement] : insight and judgment were intact [de-identified] : Pain w/ elevating RUE  [de-identified] : decreased voluntary movement, masked facies  [de-identified] : Mild bradykinesia

## 2024-04-01 NOTE — REVIEW OF SYSTEMS
[Joint Pain] : joint pain [Joint Stiffness] : joint stiffness [Negative] : Heme/Lymph [FreeTextEntry9] : R shoulder and neck pain, as previous, improved

## 2024-04-04 DIAGNOSIS — I10 ESSENTIAL (PRIMARY) HYPERTENSION: ICD-10-CM

## 2024-04-04 DIAGNOSIS — E11.9 TYPE 2 DIABETES MELLITUS WITHOUT COMPLICATIONS: ICD-10-CM

## 2024-04-04 DIAGNOSIS — M47.812 SPONDYLOSIS WITHOUT MYELOPATHY OR RADICULOPATHY, CERVICAL REGION: ICD-10-CM

## 2024-04-04 DIAGNOSIS — M75.01 ADHESIVE CAPSULITIS OF RIGHT SHOULDER: ICD-10-CM

## 2024-04-04 DIAGNOSIS — E78.5 HYPERLIPIDEMIA, UNSPECIFIED: ICD-10-CM

## 2024-04-04 DIAGNOSIS — H90.3 SENSORINEURAL HEARING LOSS, BILATERAL: ICD-10-CM

## 2024-04-04 DIAGNOSIS — M54.12 RADICULOPATHY, CERVICAL REGION: ICD-10-CM

## 2024-04-04 DIAGNOSIS — R25.1 TREMOR, UNSPECIFIED: ICD-10-CM

## 2024-04-04 DIAGNOSIS — E55.9 VITAMIN D DEFICIENCY, UNSPECIFIED: ICD-10-CM

## 2024-04-08 ENCOUNTER — OUTPATIENT (OUTPATIENT)
Dept: OUTPATIENT SERVICES | Facility: HOSPITAL | Age: 64
LOS: 1 days | End: 2024-04-08
Payer: MEDICAID

## 2024-04-08 ENCOUNTER — APPOINTMENT (OUTPATIENT)
Dept: PODIATRY | Facility: HOSPITAL | Age: 64
End: 2024-04-08
Payer: COMMERCIAL

## 2024-04-08 VITALS
SYSTOLIC BLOOD PRESSURE: 118 MMHG | BODY MASS INDEX: 30.45 KG/M2 | HEART RATE: 88 BPM | RESPIRATION RATE: 16 BRPM | HEIGHT: 67 IN | WEIGHT: 194 LBS | DIASTOLIC BLOOD PRESSURE: 77 MMHG | TEMPERATURE: 98 F

## 2024-04-08 DIAGNOSIS — M21.41 FLAT FOOT [PES PLANUS] (ACQUIRED), RIGHT FOOT: ICD-10-CM

## 2024-04-08 DIAGNOSIS — M21.42 FLAT FOOT [PES PLANUS] (ACQUIRED), RIGHT FOOT: ICD-10-CM

## 2024-04-08 DIAGNOSIS — E11.9 TYPE 2 DIABETES MELLITUS W/OUT COMPLICATIONS: ICD-10-CM

## 2024-04-08 DIAGNOSIS — M79.609 PAIN IN UNSPECIFIED LIMB: ICD-10-CM

## 2024-04-08 PROCEDURE — 99202 OFFICE O/P NEW SF 15 MIN: CPT

## 2024-04-08 PROCEDURE — G0463: CPT

## 2024-04-08 PROCEDURE — 99212 OFFICE O/P EST SF 10 MIN: CPT

## 2024-04-08 RX ORDER — CARBIDOPA AND LEVODOPA 25; 100 MG/1; MG/1
25-100 TABLET, EXTENDED RELEASE ORAL 3 TIMES DAILY
Qty: 270 | Refills: 3 | Status: ACTIVE | COMMUNITY

## 2024-04-08 NOTE — ASSESSMENT
[FreeTextEntry1] : 63M presents for diabetic foot evaluation. - Pt seen and evaluated. - BL feet no open wounds, no acute signs of infection, elongated nails x10. - Debrided nails x10 to a healthy length, without incident. - Provided diabetic foot care education. - RTC in 3 months.

## 2024-04-08 NOTE — PHYSICAL EXAM
[2+] : left foot dorsalis pedis 2+ [Skin Color & Pigmentation] : normal skin color and pigmentation [Skin Turgor] : normal skin turgor [Skin Lesions] : no skin lesions [Sensation] : the sensory exam was normal to light touch and pinprick [de-identified] : BL 5/5 MMT in all planes (dorsiflexion, plantarflexion, inversion, eversion), BL medial arch collapse. [FreeTextEntry1] : BL feet no open wounds, no acute signs of infection, elongated nails x10.

## 2024-04-08 NOTE — HISTORY OF PRESENT ILLNESS
[FreeTextEntry1] : 63M presents for BL diabetic foot evaluation. Pt was sent by his PCP for a diabetic foot evaluation. States he cuts his own nails but it has become increasingly difficult at his age. Pt is unsure of his most recent HgA1c. Denies N/V/F/C/SOB.  4/8/24 FBS: 115mg/dL

## 2024-04-10 DIAGNOSIS — E11.9 TYPE 2 DIABETES MELLITUS WITHOUT COMPLICATIONS: ICD-10-CM

## 2024-04-10 DIAGNOSIS — M21.41 FLAT FOOT [PES PLANUS] (ACQUIRED), RIGHT FOOT: ICD-10-CM

## 2024-04-18 ENCOUNTER — APPOINTMENT (OUTPATIENT)
Dept: PHYSICAL MEDICINE AND REHAB | Facility: CLINIC | Age: 64
End: 2024-04-18
Payer: COMMERCIAL

## 2024-04-18 ENCOUNTER — OUTPATIENT (OUTPATIENT)
Dept: OUTPATIENT SERVICES | Facility: HOSPITAL | Age: 64
LOS: 1 days | End: 2024-04-18

## 2024-04-18 DIAGNOSIS — M54.12 RADICULOPATHY, CERVICAL REGION: ICD-10-CM

## 2024-04-18 DIAGNOSIS — M54.13 RADICULOPATHY, CERVICOTHORACIC REGION: ICD-10-CM

## 2024-04-18 DIAGNOSIS — M47.812 SPONDYLOSIS W/OUT MYELOPATHY OR RADICULOPATHY, CERVICAL REGION: ICD-10-CM

## 2024-04-18 DIAGNOSIS — E11.65 TYPE 2 DIABETES MELLITUS WITH HYPERGLYCEMIA: ICD-10-CM

## 2024-04-18 DIAGNOSIS — M50.30 OTHER CERVICAL DISC DEGENERATION, UNSPECIFIED CERVICAL REGION: ICD-10-CM

## 2024-04-18 PROCEDURE — 62321 NJX INTERLAMINAR CRV/THRC: CPT

## 2024-04-18 PROCEDURE — 82962 GLUCOSE BLOOD TEST: CPT

## 2024-04-18 NOTE — PROCEDURE
[de-identified] : [facility] [address], [place], [address] - [phone]  PATIENT:  ANN MARIE RIVERA  MEDICAL RECORD #:  DATE OF OPERATION:  04/18/2024  PREOPERATIVE DIAGNOSIS:  CERVICAL RADICULAR PAIN POSTOPERATIVE DIAGNOSIS:  CERVICAL RADICULAR PAIN PROCEDURE: CERVICAL EPIDURAL STEROID INJECTION UNDER X-RAY GUIDANCE SURGEON:  JASON HARRELL M.D. ANESTHEISA:  LOCAL EBL:  MINIMAL  INDICATIONS:  The patient returns to Pain Management with persistent neck pain with radiation down the right arm.  The pain has remained quite significant and interferes with the patients ability to perform ADLs despite the implementation of other conservative measures.  I discussed with the patient at length the risks, benefits, and expectations of the aforementioned procedure.  All of the patients questions were answered.  The patient signed informed consent and agreed to proceed.  PROCEDURE:  The patient was transported to the operating room, placed in the prone position and monitored non-invasively with stable vital signs and no complaints.  The patients back was prepped three times with betadine and draped in meticulous sterile fashion.  The C6-C7 interspace was identified fluoroscopically and the skin overlying this level was infiltrated with 5cc of 1% preservative-free lidocaine using a 25 gauge one inch needle.  The epidural space was approached and entered at this level with a 20 gauge Tuohy needle by loss of resistance technique.  Following loss of resistance to air, aspiration was negative for CSF or heme.  Then, 2cc of Omnipaque 240 were injected and the epidurogram revealed spread from C2 to T1 in the posterior epidural space bilaterally with right-sided predominance and no distinct cutoff.  Depo-Medrol 80mg was then injected with 2cc of preservative-free normal saline.  The needle tract was flushed with 2cc of 1% lidocaine and the needle was removed.  A sterile bandage was applied.  The patient tolerated the procedure well without complaint or complication.  The patient was observed in stable condition after the procedure for approximately 30 minutes before being discharged to home in the company of an adult.  The patient was provided with full written instructions.  The patient will be seen for follow-up in my office in 1 week but certainly sooner with any questions or concerns.    Jason Harrell M.D.

## 2024-05-02 ENCOUNTER — APPOINTMENT (OUTPATIENT)
Dept: OPHTHALMOLOGY | Facility: CLINIC | Age: 64
End: 2024-05-02

## 2024-05-08 ENCOUNTER — APPOINTMENT (OUTPATIENT)
Dept: PHYSICAL MEDICINE AND REHAB | Facility: CLINIC | Age: 64
End: 2024-05-08

## 2024-06-03 ENCOUNTER — NON-APPOINTMENT (OUTPATIENT)
Age: 64
End: 2024-06-03

## 2024-06-03 RX ORDER — EMPAGLIFLOZIN 10 MG/1
10 TABLET, FILM COATED ORAL DAILY
Qty: 90 | Refills: 3 | Status: ACTIVE | COMMUNITY
Start: 2024-06-03

## 2024-06-13 ENCOUNTER — APPOINTMENT (OUTPATIENT)
Dept: PHARMACY | Facility: CLINIC | Age: 64
End: 2024-06-13
Payer: SELF-PAY

## 2024-06-13 PROCEDURE — V5299A: CUSTOM

## 2024-06-13 NOTE — REASON FOR VISIT
[Follow-Up] : follow-up for [Hearing Aid] : Hearing Aid [FreeTextEntry1] : 64-year-old M with known mild sloping to moderately severe SNHL, bilaterally. Currently fit with [employer] P50 R hearing aids [place]. UW until 07/17/25. Domes: medium vented.  Patient seen today for check of hearing aids reporting that hearing aids are not working well. He also reports that he feels that domes are too large.

## 2024-06-13 NOTE — ASSESSMENT
[FreeTextEntry1] : Thoroughly cleaned and checked hearing aids. Ran hearing aids through [employer]. No excessive moisture removed from devices. Listening check revealed hearing aids to be in good working condition. Changed domes to medium vented. Patient more comfortable with medium domes and confirmed they felt secure in his ears. Reprogrammed hearing aids to audio from February 2022. Patient comfortable in the office with sound quality.   REC: [facility] with annual audio or sooner as needed

## 2024-07-01 ENCOUNTER — APPOINTMENT (OUTPATIENT)
Dept: PODIATRY | Facility: HOSPITAL | Age: 64
End: 2024-07-01

## 2024-07-08 ENCOUNTER — RX RENEWAL (OUTPATIENT)
Age: 64
End: 2024-07-08

## 2024-07-08 RX ORDER — MULTIVIT-MIN/FOLIC/VIT K/LYCOP 400-300MCG
10 MCG TABLET ORAL
Qty: 90 | Refills: 3 | Status: ACTIVE | COMMUNITY
Start: 2024-07-08 | End: 1900-01-01

## 2024-08-05 ENCOUNTER — RX RENEWAL (OUTPATIENT)
Age: 64
End: 2024-08-05

## 2024-08-12 ENCOUNTER — APPOINTMENT (OUTPATIENT)
Dept: INTERNAL MEDICINE | Facility: CLINIC | Age: 64
End: 2024-08-12
Payer: COMMERCIAL

## 2024-08-12 VITALS
OXYGEN SATURATION: 97 % | WEIGHT: 200 LBS | HEART RATE: 78 BPM | SYSTOLIC BLOOD PRESSURE: 117 MMHG | BODY MASS INDEX: 31.39 KG/M2 | TEMPERATURE: 98.5 F | HEIGHT: 67 IN | RESPIRATION RATE: 16 BRPM | DIASTOLIC BLOOD PRESSURE: 72 MMHG

## 2024-08-12 DIAGNOSIS — E78.5 HYPERLIPIDEMIA, UNSPECIFIED: ICD-10-CM

## 2024-08-12 DIAGNOSIS — D75.1 SECONDARY POLYCYTHEMIA: ICD-10-CM

## 2024-08-12 DIAGNOSIS — E11.9 TYPE 2 DIABETES MELLITUS W/OUT COMPLICATIONS: ICD-10-CM

## 2024-08-12 PROCEDURE — 99213 OFFICE O/P EST LOW 20 MIN: CPT | Mod: GE

## 2024-08-13 NOTE — REVIEW OF SYSTEMS
[Negative] : Psychiatric [Fever] : no fever [Chills] : no chills [Night Sweats] : no night sweats [Recent Change In Weight] : ~T no recent weight change [Chest Pain] : no chest pain [Palpitations] : no palpitations [Shortness Of Breath] : no shortness of breath [Cough] : no cough [Abdominal Pain] : no abdominal pain [Nausea] : no nausea [Constipation] : no constipation [Diarrhea] : no diarrhea [Vomiting] : no vomiting

## 2024-08-13 NOTE — HISTORY OF PRESENT ILLNESS
[FreeTextEntry1] : A1c Follow Up [de-identified] : Mr. Rc Arias is a 64-year-old male p/ PMH of HTN, HLD, T2DM, polycythemia 2/2 JOHNNIE, splenic/renal infarcts (coumadin d/c'd iso hemarthrosis), SNHL, R shoulder adhesive capsulitis, R cervical radiculopathy, vitamin D deficiency, Parkinson's like tremor presenting for follow up of HLD and T2DM management. Patient feels well, recently followed up with Neurology for resting tremor, carbidopa-levodopa reportedly increased. Jardiance started and atorvastatin increased since last visit. Otherwise, no acute complaints, denies fever, chills, chest pain, nausea, vomiting, diarrhea, constipation.

## 2024-08-13 NOTE — PHYSICAL EXAM
[No Acute Distress] : no acute distress [Well Developed] : well developed [Normal Sclera/Conjunctiva] : normal sclera/conjunctiva [PERRL] : pupils equal round and reactive to light [EOMI] : extraocular movements intact [Normal Outer Ear/Nose] : the outer ears and nose were normal in appearance [Normal Oropharynx] : the oropharynx was normal [No Respiratory Distress] : no respiratory distress  [No Accessory Muscle Use] : no accessory muscle use [Clear to Auscultation] : lungs were clear to auscultation bilaterally [Normal Rate] : normal rate  [Regular Rhythm] : with a regular rhythm [Normal S1, S2] : normal S1 and S2 [No Murmur] : no murmur heard [Normal] : no carotid or abdominal bruits heard, no varicosities, pedal pulses are present, no peripheral edema, no extremity clubbing or cyanosis and no palpable aorta [Soft] : abdomen soft [Non Tender] : non-tender [Non-distended] : non-distended [No HSM] : no HSM [No Rash] : no rash [Coordination Grossly Intact] : coordination grossly intact [No Focal Deficits] : no focal deficits [Normal Affect] : the affect was normal [Normal Insight/Judgement] : insight and judgment were intact

## 2024-08-13 NOTE — ASSESSMENT
[FreeTextEntry1] : Assessment: Mr. Rc Arias is a 64-year-old male p/ PMH of HTN, HLD, T2DM, polycythemia 2/2 JOHNNIE, splenic/renal infarcts (coumadin d/c'd iso hemarthrosis), SNHL, R shoulder adhesive capsulitis, R cervical radiculopathy, vitamin D deficiency, Parkinson's like tremor presenting for follow up of HLD and T2DM management.  Plan: #HLD: -  in 11/2023, increased atorvastatin from 40mg to 80mg qd last visit in 03/2024 > C/w atorvastatin 80mg qd > Lipid panel ordered  #T2DM - A1c 6.6% in 11/2023, started on Jardiance last visit > C/w metformin 1g BID, Jardiance 10mg qd > A1c ordered  #Polycythemia: - CBC has been stable, hgb ~17 > CBC ordered  #HCM: > GI referral for colonoscopy > RTC in 3mo for CPE  Case d/w Dr. Willie So MD Internal Medicine, PGY-2 [employer] [facility]

## 2024-08-13 NOTE — END OF VISIT
[] : Resident [FreeTextEntry3] : 62 yo M w PMH HTN, HLD, DM2, polycythemia 2/2 JOHNNIE, hx splenic infarct and renal thrombosis (prev on coumadin but d/ashley due to hemarthrosis), Parkinson tremor coming in for follow up. Recently increased statin dose and started on jardiance last visit which he is tolerating well. Repeat labs today. Following with neurology for Parkinsons tremor now on carbidopa levadopa. In terms of HCM, due for CPE at next visit and due for repeat colonoscopy.

## 2024-08-16 LAB
25(OH)D3 SERPL-MCNC: 25.5 NG/ML
ALBUMIN SERPL ELPH-MCNC: 4.8 G/DL
ALP BLD-CCNC: 72 U/L
ALT SERPL-CCNC: 9 U/L
ANION GAP SERPL CALC-SCNC: 16 MMOL/L
AST SERPL-CCNC: 10 U/L
BASOPHILS # BLD AUTO: 0.04 K/UL
BASOPHILS NFR BLD AUTO: 0.6 %
BILIRUB SERPL-MCNC: 0.6 MG/DL
BUN SERPL-MCNC: 13 MG/DL
CALCIUM SERPL-MCNC: 10.2 MG/DL
CHLORIDE SERPL-SCNC: 100 MMOL/L
CHOLEST SERPL-MCNC: 163 MG/DL
CO2 SERPL-SCNC: 23 MMOL/L
CREAT SERPL-MCNC: 1.09 MG/DL
EGFR: 76 ML/MIN/1.73M2
EOSINOPHIL # BLD AUTO: 0.18 K/UL
EOSINOPHIL NFR BLD AUTO: 2.9 %
ESTIMATED AVERAGE GLUCOSE: 140 MG/DL
GLUCOSE SERPL-MCNC: 157 MG/DL
HBA1C MFR BLD HPLC: 6.5 %
HCT VFR BLD CALC: 47.8 %
HDLC SERPL-MCNC: 48 MG/DL
HGB BLD-MCNC: 16.8 G/DL
IMM GRANULOCYTES NFR BLD AUTO: 0.3 %
LDLC SERPL CALC-MCNC: 94 MG/DL
LYMPHOCYTES # BLD AUTO: 2.85 K/UL
LYMPHOCYTES NFR BLD AUTO: 45.5 %
MAN DIFF?: NORMAL
MCHC RBC-ENTMCNC: 29.4 PG
MCHC RBC-ENTMCNC: 35.1 GM/DL
MCV RBC AUTO: 83.7 FL
MONOCYTES # BLD AUTO: 0.56 K/UL
MONOCYTES NFR BLD AUTO: 8.9 %
NEUTROPHILS # BLD AUTO: 2.62 K/UL
NEUTROPHILS NFR BLD AUTO: 41.8 %
NONHDLC SERPL-MCNC: 115 MG/DL
PLATELET # BLD AUTO: 296 K/UL
POTASSIUM SERPL-SCNC: 4.4 MMOL/L
PROT SERPL-MCNC: 7.4 G/DL
RBC # BLD: 5.71 M/UL
RBC # FLD: 14.2 %
SODIUM SERPL-SCNC: 139 MMOL/L
TRIGL SERPL-MCNC: 116 MG/DL
WBC # FLD AUTO: 6.27 K/UL

## 2024-09-09 ENCOUNTER — RX RENEWAL (OUTPATIENT)
Age: 64
End: 2024-09-09

## 2024-09-25 NOTE — ED PROVIDER NOTE - CARDIAC, MLM
Detail Level: Detailed
Sunscreen Recommendations: Daily application, spf 30+
Detail Level: Zone
Sunscreen Recommendations: spf 30+
Detail Level: Generalized
Moisturizer Recommendations: Amlactin
Normal rate, regular rhythm.  Heart sounds S1, S2.  No murmurs, rubs or gallops.
Detail Level: Simple

## 2024-11-11 ENCOUNTER — RX RENEWAL (OUTPATIENT)
Age: 64
End: 2024-11-11

## 2024-11-18 ENCOUNTER — APPOINTMENT (OUTPATIENT)
Dept: PHARMACY | Facility: CLINIC | Age: 64
End: 2024-11-18
Payer: SELF-PAY

## 2024-11-18 PROCEDURE — V5299A: CUSTOM

## 2024-11-26 ENCOUNTER — OUTPATIENT (OUTPATIENT)
Dept: OUTPATIENT SERVICES | Facility: HOSPITAL | Age: 64
LOS: 1 days | End: 2024-11-26

## 2024-11-26 ENCOUNTER — APPOINTMENT (OUTPATIENT)
Dept: INTERNAL MEDICINE | Facility: CLINIC | Age: 64
End: 2024-11-26

## 2024-11-26 VITALS
RESPIRATION RATE: 17 BRPM | SYSTOLIC BLOOD PRESSURE: 114 MMHG | HEIGHT: 67 IN | DIASTOLIC BLOOD PRESSURE: 70 MMHG | HEART RATE: 87 BPM | WEIGHT: 186 LBS | OXYGEN SATURATION: 95 % | BODY MASS INDEX: 29.19 KG/M2

## 2024-11-26 DIAGNOSIS — R25.1 TREMOR, UNSPECIFIED: ICD-10-CM

## 2024-11-26 DIAGNOSIS — G47.33 OBSTRUCTIVE SLEEP APNEA (ADULT) (PEDIATRIC): ICD-10-CM

## 2024-11-26 DIAGNOSIS — D75.1 SECONDARY POLYCYTHEMIA: ICD-10-CM

## 2024-11-26 DIAGNOSIS — E78.5 HYPERLIPIDEMIA, UNSPECIFIED: ICD-10-CM

## 2024-11-26 PROCEDURE — 99396 PREV VISIT EST AGE 40-64: CPT | Mod: GC

## 2024-11-26 PROCEDURE — 99386 PREV VISIT NEW AGE 40-64: CPT | Mod: GC

## 2024-11-29 LAB
CREAT SPEC-SCNC: 55 MG/DL
CREAT/PROT UR: 0.1 RATIO
PROT UR-MCNC: 4 MG/DL

## 2024-12-03 DIAGNOSIS — R25.1 TREMOR, UNSPECIFIED: ICD-10-CM

## 2024-12-03 DIAGNOSIS — D75.1 SECONDARY POLYCYTHEMIA: ICD-10-CM

## 2024-12-03 DIAGNOSIS — G47.33 OBSTRUCTIVE SLEEP APNEA (ADULT) (PEDIATRIC): ICD-10-CM

## 2024-12-03 DIAGNOSIS — E78.5 HYPERLIPIDEMIA, UNSPECIFIED: ICD-10-CM

## 2024-12-09 ENCOUNTER — RX RENEWAL (OUTPATIENT)
Age: 64
End: 2024-12-09

## 2025-02-10 ENCOUNTER — APPOINTMENT (OUTPATIENT)
Dept: PHARMACY | Facility: CLINIC | Age: 65
End: 2025-02-10

## 2025-02-10 ENCOUNTER — APPOINTMENT (OUTPATIENT)
Dept: OTOLARYNGOLOGY | Facility: CLINIC | Age: 65
End: 2025-02-10

## 2025-03-18 NOTE — DISCHARGE NOTE NURSING/CASE MANAGEMENT/SOCIAL WORK - PATIENT PORTAL LINK FT
March 18, 2025    Debbi Guerra  [address]  [place] [address]      Dear Mr. Guerra:    ***    If you have any questions or concerns, please don't hesitate to call.    Sincerely,             Brooke Moreland MD PhD        CC: No Recipients You can access the FollowMyHealth Patient Portal offered by [facility] by registering at the following website: [email or web address]. By joining [employer]’s FollowMyHealth portal, you will also be able to view your health information using other applications (apps) compatible with our system.

## 2025-04-08 ENCOUNTER — RX RENEWAL (OUTPATIENT)
Age: 65
End: 2025-04-08

## 2025-05-14 ENCOUNTER — RX RENEWAL (OUTPATIENT)
Age: 65
End: 2025-05-14

## 2025-06-03 ENCOUNTER — APPOINTMENT (OUTPATIENT)
Dept: GASTROENTEROLOGY | Facility: HOSPITAL | Age: 65
End: 2025-06-03

## 2025-06-13 ENCOUNTER — RX RENEWAL (OUTPATIENT)
Age: 65
End: 2025-06-13

## 2025-06-13 RX ORDER — 70%ISOPROPYL ALCOHOL 0.7 ML/ML
70 SWAB TOPICAL
Qty: 1 | Refills: 4 | Status: ACTIVE | COMMUNITY
Start: 2025-06-13 | End: 1900-01-01

## 2025-06-26 ENCOUNTER — APPOINTMENT (OUTPATIENT)
Dept: INTERNAL MEDICINE | Facility: CLINIC | Age: 65
End: 2025-06-26
Payer: COMMERCIAL

## 2025-06-26 ENCOUNTER — OUTPATIENT (OUTPATIENT)
Dept: OUTPATIENT SERVICES | Facility: HOSPITAL | Age: 65
LOS: 1 days | End: 2025-06-26

## 2025-06-26 VITALS
RESPIRATION RATE: 16 BRPM | HEART RATE: 88 BPM | SYSTOLIC BLOOD PRESSURE: 107 MMHG | WEIGHT: 196 LBS | DIASTOLIC BLOOD PRESSURE: 70 MMHG | OXYGEN SATURATION: 96 % | TEMPERATURE: 97.6 F | HEIGHT: 67 IN | BODY MASS INDEX: 30.76 KG/M2

## 2025-06-26 PROCEDURE — G2211 COMPLEX E/M VISIT ADD ON: CPT | Mod: NC,GC

## 2025-06-26 PROCEDURE — 99214 OFFICE O/P EST MOD 30 MIN: CPT | Mod: GC

## 2025-06-27 DIAGNOSIS — I10 ESSENTIAL (PRIMARY) HYPERTENSION: ICD-10-CM

## 2025-06-27 DIAGNOSIS — R25.1 TREMOR, UNSPECIFIED: ICD-10-CM

## 2025-06-27 DIAGNOSIS — E78.5 HYPERLIPIDEMIA, UNSPECIFIED: ICD-10-CM

## 2025-06-27 LAB
ALBUMIN SERPL ELPH-MCNC: 4.8 G/DL
ALP BLD-CCNC: 78 U/L
ALT SERPL-CCNC: 12 U/L
ANION GAP SERPL CALC-SCNC: 16 MMOL/L
AST SERPL-CCNC: 12 U/L
BILIRUB SERPL-MCNC: 0.6 MG/DL
BUN SERPL-MCNC: 17 MG/DL
CALCIUM SERPL-MCNC: 10.6 MG/DL
CHLORIDE SERPL-SCNC: 100 MMOL/L
CHOLEST SERPL-MCNC: 121 MG/DL
CO2 SERPL-SCNC: 25 MMOL/L
CREAT SERPL-MCNC: 1.05 MG/DL
CREAT SPEC-SCNC: 62 MG/DL
EGFRCR SERPLBLD CKD-EPI 2021: 79 ML/MIN/1.73M2
ESTIMATED AVERAGE GLUCOSE: 137 MG/DL
GLUCOSE SERPL-MCNC: 106 MG/DL
HBA1C MFR BLD HPLC: 6.4 %
HDLC SERPL-MCNC: 40 MG/DL
LDLC SERPL-MCNC: 67 MG/DL
MICROALBUMIN 24H UR DL<=1MG/L-MCNC: <1.2 MG/DL
MICROALBUMIN/CREAT 24H UR-RTO: NORMAL MG/G
NONHDLC SERPL-MCNC: 81 MG/DL
POTASSIUM SERPL-SCNC: 4.7 MMOL/L
PROT SERPL-MCNC: 7.5 G/DL
SODIUM SERPL-SCNC: 141 MMOL/L
TRIGL SERPL-MCNC: 67 MG/DL

## 2025-08-15 ENCOUNTER — RX RENEWAL (OUTPATIENT)
Age: 65
End: 2025-08-15

## 2025-09-10 ENCOUNTER — APPOINTMENT (OUTPATIENT)
Dept: NEUROLOGY | Facility: CLINIC | Age: 65
End: 2025-09-10

## 2025-09-11 ENCOUNTER — APPOINTMENT (OUTPATIENT)
Dept: NEUROLOGY | Facility: CLINIC | Age: 65
End: 2025-09-11

## 2025-09-11 ENCOUNTER — NON-APPOINTMENT (OUTPATIENT)
Age: 65
End: 2025-09-11

## 2025-09-11 VITALS
BODY MASS INDEX: 29.82 KG/M2 | DIASTOLIC BLOOD PRESSURE: 77 MMHG | HEIGHT: 67 IN | HEART RATE: 79 BPM | OXYGEN SATURATION: 99 % | SYSTOLIC BLOOD PRESSURE: 133 MMHG | TEMPERATURE: 98 F | WEIGHT: 190 LBS

## 2025-09-11 DIAGNOSIS — G20.A1 PARKINSON'S DISEASE WITHOUT DYSKINESIA, WITHOUT MENTION OF FLUCTUATIONS: ICD-10-CM

## 2025-09-11 DIAGNOSIS — Z91.199 PATIENT'S NONCOMPLIANCE WITH OTHER MEDICAL TREATMENT AND REGIMEN DUE TO UNSPECIFIED REASON: ICD-10-CM

## 2025-09-11 DIAGNOSIS — R25.1 TREMOR, UNSPECIFIED: ICD-10-CM

## 2025-09-11 PROCEDURE — 99205 OFFICE O/P NEW HI 60 MIN: CPT

## 2025-09-11 RX ORDER — CARBIDOPA AND LEVODOPA 25; 100 MG/1; MG/1
25-100 TABLET ORAL
Qty: 90 | Refills: 5 | Status: ACTIVE | COMMUNITY
Start: 2025-09-11 | End: 1900-01-01